# Patient Record
Sex: MALE | Race: WHITE | NOT HISPANIC OR LATINO | Employment: FULL TIME | URBAN - METROPOLITAN AREA
[De-identification: names, ages, dates, MRNs, and addresses within clinical notes are randomized per-mention and may not be internally consistent; named-entity substitution may affect disease eponyms.]

---

## 2017-02-07 ENCOUNTER — GENERIC CONVERSION - ENCOUNTER (OUTPATIENT)
Dept: OTHER | Facility: OTHER | Age: 52
End: 2017-02-07

## 2017-02-07 LAB
AMBIG ABBREV CMP14 DEFAULT (HISTORICAL): NORMAL
AMBIG ABBREV LP DEFAULT (HISTORICAL): NORMAL

## 2017-02-08 LAB
A/G RATIO (HISTORICAL): 1.8 (ref 1.1–2.5)
ALBUMIN SERPL BCP-MCNC: 4.6 G/DL (ref 3.5–5.5)
ALP SERPL-CCNC: 74 IU/L (ref 39–117)
ALT SERPL W P-5'-P-CCNC: 52 IU/L (ref 0–44)
AST SERPL W P-5'-P-CCNC: 29 IU/L (ref 0–40)
BILIRUB SERPL-MCNC: 0.4 MG/DL (ref 0–1.2)
BUN SERPL-MCNC: 17 MG/DL (ref 6–24)
BUN/CREA RATIO (HISTORICAL): 18 (ref 9–20)
CALCIUM SERPL-MCNC: 9.4 MG/DL (ref 8.7–10.2)
CHLORIDE SERPL-SCNC: 100 MMOL/L (ref 96–106)
CHOLEST SERPL-MCNC: 219 MG/DL (ref 100–199)
CO2 SERPL-SCNC: 23 MMOL/L (ref 18–29)
CREAT SERPL-MCNC: 0.93 MG/DL (ref 0.76–1.27)
DEPRECATED RDW RBC AUTO: 14.5 % (ref 12.3–15.4)
EGFR AFRICAN AMERICAN (HISTORICAL): 110 ML/MIN/1.73
EGFR-AMERICAN CALC (HISTORICAL): 95 ML/MIN/1.73
GLUCOSE SERPL-MCNC: 98 MG/DL (ref 65–99)
HCT VFR BLD AUTO: 47.9 % (ref 37.5–51)
HDLC SERPL-MCNC: 30 MG/DL
HGB BLD-MCNC: 16.9 G/DL (ref 12.6–17.7)
LDLC SERPL CALC-MCNC: ABNORMAL MG/DL (ref 0–99)
MCH RBC QN AUTO: 30.7 PG (ref 26.6–33)
MCHC RBC AUTO-ENTMCNC: 35.3 G/DL (ref 31.5–35.7)
MCV RBC AUTO: 87 FL (ref 79–97)
PLATELET # BLD AUTO: 266 X10E3/UL (ref 150–379)
POTASSIUM SERPL-SCNC: 4.6 MMOL/L (ref 3.5–5.2)
RBC (HISTORICAL): 5.5 X10E6/UL (ref 4.14–5.8)
SODIUM SERPL-SCNC: 141 MMOL/L (ref 134–144)
TOT. GLOBULIN, SERUM (HISTORICAL): 2.5 G/DL (ref 1.5–4.5)
TOTAL PROTEIN (HISTORICAL): 7.1 G/DL (ref 6–8.5)
TRIGL SERPL-MCNC: 412 MG/DL (ref 0–149)
TSH SERPL DL<=0.05 MIU/L-ACNC: 1.7 UIU/ML (ref 0.45–4.5)
VLDLC SERPL CALC-MCNC: ABNORMAL MG/DL (ref 5–40)
WBC # BLD AUTO: 6.6 X10E3/UL (ref 3.4–10.8)

## 2017-03-02 ENCOUNTER — ALLSCRIPTS OFFICE VISIT (OUTPATIENT)
Dept: OTHER | Facility: OTHER | Age: 52
End: 2017-03-02

## 2017-03-02 ENCOUNTER — GENERIC CONVERSION - ENCOUNTER (OUTPATIENT)
Dept: OTHER | Facility: OTHER | Age: 52
End: 2017-03-02

## 2017-04-28 ENCOUNTER — ALLSCRIPTS OFFICE VISIT (OUTPATIENT)
Dept: OTHER | Facility: OTHER | Age: 52
End: 2017-04-28

## 2018-01-09 NOTE — RESULT NOTES
Verified Results  (1) PSA (SCREEN) (Dx V76 44 Screen for Prostate Cancer) [id number] 11:20AM Lidya [place]     Test Name Result Flag Reference   Prostate Specific Ag, Serum 0 6 ng/mL  0 0-4 0   Roche ECLIA methodology  According to the American Urological Association, Serum PSA should  decrease and remain at undetectable levels after radical  prostatectomy  The AUA defines biochemical recurrence as an initial  PSA value 0 2 ng/mL or greater followed by a subsequent confirmatory  PSA value 0 2 ng/mL or greater  Values obtained with different assay methods or kits cannot be used  interchangeably  Results cannot be interpreted as absolute evidence  of the presence or absence of malignant disease  (1) COMPREHENSIVE METABOLIC PANEL [id number] 23:47OF [place] [place]   A courtesy copy of this report has been sent to  the patient       Test Name Result Flag Reference   Glucose, Serum 98 mg/dL  65-99   BUN 17 mg/dL  6-24   Creatinine, Serum 0 93 mg/dL  0 76-1 27   eGFR If NonAfricn Am 95 mL/min/1 73  >59   eGFR If Africn Am 110 mL/min/1 73  >59   BUN/Creatinine Ratio 18  9-20   Sodium, Serum 141 mmol/L  134-144   Potassium, Serum 4 6 mmol/L  3 5-5 2   Chloride, Serum 100 mmol/L     Carbon Dioxide, Total 23 mmol/L  18-29   Calcium, Serum 9 4 mg/dL  8 7-10 2   Protein, Total, Serum 7 1 g/dL  6 0-8 5   Albumin, Serum 4 6 g/dL  3 5-5 5   Globulin, Total 2 5 g/dL  1 5-4 5   A/G Ratio 1 8  1 1-2 5   Bilirubin, Total 0 4 mg/dL  0 0-1 2   Alkaline Phosphatase, S 74 IU/L     AST (SGOT) 29 IU/L  0-40   ALT (SGPT) 52 IU/L H 0-44     (1) CBC/ PLT (NO DIFF) [id number] 11:20AM Lidya [place]     Test Name Result Flag Reference   WBC 6 6 x10E3/uL  3 4-10 8   RBC 5 50 x10E6/uL  4 14-5 80   Hemoglobin 16 9 g/dL  12 6-17 7   Hematocrit 47 9 %  37 5-51 0   MCV 87 fL  79-97   MCH 30 7 pg  26 6-33 0   MCHC 35 3 g/dL  31 5-35 7   RDW 14 5 %  12 3-15 4   Platelets 399 Q07V0/VV  150-379     (Formerly [facility]) Lipid Panel [id number] 11:20AM Vijaya, Will [place]gal[place]     Test Name Result Flag Reference   Cholesterol, Total 219 mg/dL H 100-199   Triglycerides 412 mg/dL H 0-149   HDL Cholesterol 30 mg/dL L >39   VLDL Cholesterol Luis Comment mg/dL  5-40   The calculation for the VLDL cholesterol is not valid when  triglyceride level is >400 mg/dL  LDL Cholesterol Calc Comment mg/dL  0-99   Triglyceride result indicated is too high for an accurate LDL  cholesterol estimation  (1) TSH [id number] 11:20AM Dorthula [place]     Test Name Result Flag Reference   TSH 1 700 uIU/mL  0 450-4 500     [facility]) Eric Hernan CMP14 Default [id number] 11:20AM Dorthula [place]     Test Name Result Flag Reference   Eric Hernan CMP14 Default Comment     A hand-written panel/profile was received from your office  In  accordance with the [employer] Ambiguous Test Code Policy dated July 1242, we have completed your order by using the closest currently  or formerly recognized AMA panel  We have assigned Comprehensive  Metabolic Panel (14), Test Code #428185 to this request   If this  is not the testing you wished to receive on this specimen, please  contact the [facility] Inquiry/Technical Services Department  to clarify the test order  We appreciate your business  [facility]) Eric Hernan LP Default [id number] 11:20AM Dormarina Marian     Test Name Result Flag Reference   Ambig Abbrev LP Default Comment     A hand-written panel/profile was received from your office  In  accordance with the [employer] Ambiguous Test Code Policy dated July 3615, we have completed your order by using the closest currently  or formerly recognized AMA panel  We have assigned Lipid Panel,  Test Code #749681 to this request  If this is not the testing you  wished to receive on this specimen, please contact the [employer] Inquiry/Technical Services Department to clarify the test  order  We appreciate your business

## 2018-01-09 NOTE — RESULT NOTES
Verified Results  * XR HIPS BILATERAL WITH AP PELVIS [id number] 11:33AM Roque [place]     Test Name Result Flag Reference   XR HIPS BILATERAL WITH AP PELVIS (Report)     BILATERAL HIPS AND PELVIS     INDICATION: Bulge over posterior right hip, bilateral hip pain     COMPARISON: None     VIEWS: AP pelvis and coned down views of each hip; 5 images      FINDINGS:     No acute pelvic fracture or pathologic bone lesions  Visualized bony pelvis appears intact  LEFT HIP:   No significant degenerative changes  Bony alignment is maintained  Soft tissues are unremarkable  RIGHT HIP:   No significant degenerative changes  Bony alignment is maintained  Soft tissues are unremarkable  Incidental note of a small os acetabulum  Subcentimeter oval sclerotic lesion in the right femoral head compatible with a bone island  IMPRESSION:     Unremarkable hips and pelvis  Signed by:   Carol Barba MD   1/16/16       Discussion/Summary   Shilpa Chu let Zenaida Tovar know that XR was negative   F/u if sx  persist

## 2018-01-12 VITALS
BODY MASS INDEX: 28.17 KG/M2 | DIASTOLIC BLOOD PRESSURE: 80 MMHG | SYSTOLIC BLOOD PRESSURE: 130 MMHG | TEMPERATURE: 98.4 F | HEIGHT: 72 IN | WEIGHT: 208 LBS

## 2018-01-13 VITALS
TEMPERATURE: 98.2 F | DIASTOLIC BLOOD PRESSURE: 100 MMHG | SYSTOLIC BLOOD PRESSURE: 142 MMHG | BODY MASS INDEX: 27.22 KG/M2 | HEIGHT: 72 IN | WEIGHT: 201 LBS

## 2018-02-19 DIAGNOSIS — I10 BENIGN ESSENTIAL HYPERTENSION: Primary | ICD-10-CM

## 2018-02-19 RX ORDER — BISOPROLOL FUMARATE AND HYDROCHLOROTHIAZIDE 2.5; 6.25 MG/1; MG/1
TABLET ORAL
Qty: 30 TABLET | Refills: 5 | Status: SHIPPED | OUTPATIENT
Start: 2018-02-19 | End: 2018-08-07 | Stop reason: DRUGHIGH

## 2018-03-14 DIAGNOSIS — K21.00 ESOPHAGITIS, REFLUX: Primary | ICD-10-CM

## 2018-03-15 RX ORDER — PANTOPRAZOLE SODIUM 40 MG/1
TABLET, DELAYED RELEASE ORAL
Qty: 60 TABLET | Refills: 0 | Status: SHIPPED | OUTPATIENT
Start: 2018-03-15 | End: 2018-05-19 | Stop reason: SDUPTHER

## 2018-04-06 ENCOUNTER — OFFICE VISIT (OUTPATIENT)
Dept: FAMILY MEDICINE CLINIC | Facility: CLINIC | Age: 53
End: 2018-04-06
Payer: COMMERCIAL

## 2018-04-06 VITALS
WEIGHT: 211 LBS | SYSTOLIC BLOOD PRESSURE: 140 MMHG | TEMPERATURE: 97.2 F | DIASTOLIC BLOOD PRESSURE: 98 MMHG | BODY MASS INDEX: 29.02 KG/M2

## 2018-04-06 DIAGNOSIS — H69.81 ETD (EUSTACHIAN TUBE DYSFUNCTION), RIGHT: ICD-10-CM

## 2018-04-06 DIAGNOSIS — M54.2 CERVICALGIA: Primary | ICD-10-CM

## 2018-04-06 PROBLEM — H69.91 ETD (EUSTACHIAN TUBE DYSFUNCTION), RIGHT: Status: ACTIVE | Noted: 2018-04-06

## 2018-04-06 PROCEDURE — 99214 OFFICE O/P EST MOD 30 MIN: CPT | Performed by: FAMILY MEDICINE

## 2018-04-06 RX ORDER — CYCLOBENZAPRINE HCL 10 MG
10 TABLET ORAL 3 TIMES DAILY PRN
Qty: 30 TABLET | Refills: 0 | Status: SHIPPED | OUTPATIENT
Start: 2018-04-06 | End: 2018-08-09 | Stop reason: ALTCHOICE

## 2018-04-06 RX ORDER — ALPRAZOLAM 0.25 MG/1
1 TABLET ORAL
COMMUNITY
Start: 2011-11-03 | End: 2018-08-07 | Stop reason: SDUPTHER

## 2018-04-06 RX ORDER — FLUTICASONE PROPIONATE 50 MCG
2 SPRAY, SUSPENSION (ML) NASAL DAILY
Qty: 16 G | Refills: 5 | Status: SHIPPED | OUTPATIENT
Start: 2018-04-06 | End: 2018-08-09 | Stop reason: ALTCHOICE

## 2018-04-06 NOTE — PROGRESS NOTES
Assessment/Plan:  ETD (Eustachian tube dysfunction), right  Patient has some eustachian tube dysfunction symptoms as well as boggy nasal turbinates  He is going to use fluticasone nasal spray and called result in 2-3 weeks if not improved  Cervicalgia  Patient has some sternocleidomastoid tenderness which may be the etiology of his right-sided otalgia  We are going to give him some Flexeril to use at night  He is asked to call if his symptoms have not resolved in 7-10 days  I see no other evidence of cause for his otalgia  I feel no neck mass see no intraoral mass thyroid teeth tonsils tongue all appear normal presently  Diagnoses and all orders for this visit:    Cervicalgia  -     cyclobenzaprine (FLEXERIL) 10 mg tablet; Take 1 tablet (10 mg total) by mouth 3 (three) times a day as needed for muscle spasms    ETD (Eustachian tube dysfunction), right  -     fluticasone (FLONASE) 50 mcg/act nasal spray; 2 sprays into each nostril daily    Other orders  -     ALPRAZolam (XANAX) 0 25 mg tablet; Take 1 tablet by mouth          Subjective:   Chief Complaint   Patient presents with    Earache     right side xs three weeks     R otalgia that has been intermittent for 3 weeks  No drainage  No affect on hearing  Radiates to pre auricular region  No first am pain  No dental pain  Some ETD sx  Some sneezing  No nasal d/c and no PND  No AS c/o  Patient ID: Sancho Lopez is a 47 y o  male  HPI  The patient is a 60-year-old male who describes right-sided otalgia which has been intermittent for the past 3 weeks  He has had no effect on his hearing no drainage from the year  He does note that it radiates to the preauricular region  He has had no dental pain though he has had some eustachian tube dysfunction symptoms  He has also had some sneezing  He has had no nasal discharge and no postnasal drip  He has no symptoms on the left  He has had no tinnitus  He has no pain with chewing    He has no neck mass though he does have some left-sided neck pain at times  ry were reviewed and updated as appropriate: allergies, current medications, past medical history, past social history and problem list     Review of Systems   Constitution: Negative for fever  HENT: Positive for ear pain  Negative for ear discharge, hearing loss, odynophagia and sore throat  Respiratory: Negative for cough  Hematologic/Lymphatic: Negative for adenopathy  Musculoskeletal: Positive for neck pain  Neurological: Negative for dizziness and headaches  Objective:    Physical Exam   Constitutional: He is oriented to person, place, and time  He appears well-developed and well-nourished  HENT:   No findings in the oral cavity or dental issue  There is some boggy nasal turbinates and there are some minimal polypoid tissue on the left  Eyes: Pupils are equal, round, and reactive to light  Neck: No JVD present  No thyromegaly present  No thyroid mass or nodule   Cardiovascular: Normal rate, regular rhythm and normal heart sounds  Exam reveals no gallop  No murmur heard  Pulmonary/Chest: Effort normal and breath sounds normal    Musculoskeletal: He exhibits no edema  No TMJ tenderness  There is some tenderness of the right mastoid where it attached as to the sternocleidomastoid muscle  There is no mass in the area  Lymphadenopathy:     He has no cervical adenopathy  Neurological: He is alert and oriented to person, place, and time  Psychiatric: He has a normal mood and affect

## 2018-04-06 NOTE — ASSESSMENT & PLAN NOTE
Patient has some sternocleidomastoid tenderness which may be the etiology of his right-sided otalgia  We are going to give him some Flexeril to use at night  He is asked to call if his symptoms have not resolved in 7-10 days  I see no other evidence of cause for his otalgia  I feel no neck mass see no intraoral mass thyroid teeth tonsils tongue all appear normal presently

## 2018-04-06 NOTE — ASSESSMENT & PLAN NOTE
Patient has some eustachian tube dysfunction symptoms as well as boggy nasal turbinates  He is going to use fluticasone nasal spray and called result in 2-3 weeks if not improved

## 2018-05-19 DIAGNOSIS — K21.00 ESOPHAGITIS, REFLUX: ICD-10-CM

## 2018-05-21 RX ORDER — PANTOPRAZOLE SODIUM 40 MG/1
TABLET, DELAYED RELEASE ORAL
Qty: 60 TABLET | Refills: 0 | Status: SHIPPED | OUTPATIENT
Start: 2018-05-21 | End: 2018-07-04 | Stop reason: SDUPTHER

## 2018-06-07 DIAGNOSIS — L25.5 RHUS DERMATITIS: Primary | ICD-10-CM

## 2018-06-07 RX ORDER — METHYLPREDNISOLONE 4 MG/1
TABLET ORAL
Qty: 21 TABLET | Refills: 0 | Status: SHIPPED | OUTPATIENT
Start: 2018-06-07 | End: 2018-08-07 | Stop reason: ALTCHOICE

## 2018-07-04 DIAGNOSIS — K21.00 ESOPHAGITIS, REFLUX: ICD-10-CM

## 2018-07-05 RX ORDER — PANTOPRAZOLE SODIUM 40 MG/1
TABLET, DELAYED RELEASE ORAL
Qty: 60 TABLET | Refills: 0 | Status: SHIPPED | OUTPATIENT
Start: 2018-07-05 | End: 2018-10-09 | Stop reason: SDUPTHER

## 2018-08-07 ENCOUNTER — OFFICE VISIT (OUTPATIENT)
Dept: FAMILY MEDICINE CLINIC | Facility: CLINIC | Age: 53
End: 2018-08-07
Payer: COMMERCIAL

## 2018-08-07 VITALS
TEMPERATURE: 96.4 F | SYSTOLIC BLOOD PRESSURE: 130 MMHG | HEART RATE: 67 BPM | WEIGHT: 209 LBS | OXYGEN SATURATION: 95 % | BODY MASS INDEX: 28.74 KG/M2 | DIASTOLIC BLOOD PRESSURE: 84 MMHG

## 2018-08-07 DIAGNOSIS — I10 BENIGN ESSENTIAL HYPERTENSION: ICD-10-CM

## 2018-08-07 DIAGNOSIS — M79.673 PAIN OF FOOT, UNSPECIFIED LATERALITY: ICD-10-CM

## 2018-08-07 DIAGNOSIS — Z13.0 SCREENING FOR IRON DEFICIENCY ANEMIA: ICD-10-CM

## 2018-08-07 DIAGNOSIS — Z13.1 SCREENING FOR DIABETES MELLITUS: ICD-10-CM

## 2018-08-07 DIAGNOSIS — R22.1 SUBCUTANEOUS MASS OF NECK: ICD-10-CM

## 2018-08-07 DIAGNOSIS — E78.5 DYSLIPIDEMIA: Primary | ICD-10-CM

## 2018-08-07 DIAGNOSIS — F41.9 ANXIETY: ICD-10-CM

## 2018-08-07 PROCEDURE — 3075F SYST BP GE 130 - 139MM HG: CPT | Performed by: FAMILY MEDICINE

## 2018-08-07 PROCEDURE — 3079F DIAST BP 80-89 MM HG: CPT | Performed by: FAMILY MEDICINE

## 2018-08-07 PROCEDURE — 99214 OFFICE O/P EST MOD 30 MIN: CPT | Performed by: FAMILY MEDICINE

## 2018-08-07 RX ORDER — BISOPROLOL FUMARATE AND HYDROCHLOROTHIAZIDE 5; 6.25 MG/1; MG/1
1 TABLET ORAL DAILY
Qty: 90 TABLET | Refills: 1 | Status: SHIPPED | OUTPATIENT
Start: 2018-08-07 | End: 2019-03-14 | Stop reason: SDUPTHER

## 2018-08-07 RX ORDER — ALPRAZOLAM 0.25 MG/1
0.25 TABLET ORAL
Qty: 30 TABLET | Refills: 0 | Status: SHIPPED | OUTPATIENT
Start: 2018-08-07 | End: 2018-08-09 | Stop reason: ALTCHOICE

## 2018-08-07 NOTE — ASSESSMENT & PLAN NOTE
This may represent lipoma versus inclusion cyst   We are going to refer him to General surgery as there has been some tenderness associated with it  No constitutional symptoms

## 2018-08-07 NOTE — ASSESSMENT & PLAN NOTE
I do not believe there is a fracture though cannot rule out stress fracture metatarsal   We asked him to use a lace-up shoe with inserts  We also gave him some plantar fascia stretching exercises  If he sees no improvement in next 2-3 weeks we are going to potentially consider x-rays  He agrees

## 2018-08-07 NOTE — PROGRESS NOTES
Assessment/Plan:  Benign essential hypertension  We are going to increase his dose of Ziac to 5/6 25  He is asked to go for CBC CMP lipids  Subcutaneous mass of neck  This may represent lipoma versus inclusion cyst   We are going to refer him to General surgery as there has been some tenderness associated with it  No constitutional symptoms  Dyslipidemia  Lipid profile    Pain of foot  I do not believe there is a fracture though cannot rule out stress fracture metatarsal   We asked him to use a lace-up shoe with inserts  We also gave him some plantar fascia stretching exercises  If he sees no improvement in next 2-3 weeks we are going to potentially consider x-rays  He agrees  Diagnoses and all orders for this visit:    Dyslipidemia  -     Lipid panel; Future  -     Lipid panel    Subcutaneous mass of neck  -     Ambulatory referral to General Surgery; Future    Screening for iron deficiency anemia  -     CBC; Future  -     CBC    Screening for diabetes mellitus  -     Comprehensive metabolic panel; Future  -     Comprehensive metabolic panel    Anxiety  -     ALPRAZolam (XANAX) 0 25 mg tablet; Take 1 tablet (0 25 mg total) by mouth daily at bedtime as needed for anxiety    Benign essential hypertension  -     bisoprolol-hydrochlorothiazide (ZIAC) 5-6 25 MG per tablet; Take 1 tablet by mouth daily    Pain of foot, unspecified laterality          Subjective:   Chief Complaint   Patient presents with   [address]     pt here to discuss changing his bp meds to a higher dose  he is also c/o R foot pain when he goes to walk after sitting for a while  he is requesting a refill on her alprazolam which did renew  i will enter all his fasting labs for [facility]  last colonoscopy was in [place]  Neck mass 3 cm  , R foot pain after [place] [employer] frequently  2 months ago  I feel better when I take 1 1/2 pills  Patient ID: Shelly Banks is a 47 y o  male      HPI  Patient is a 61-year-old male here for follow-up of essential hypertension  He states that he is compliant with his Ziac 2 5 though he states that he takes 1/2 pills he typically feels better than if he takes only 1  He has had no chest pain shortness of breath fatigue lightheaded or dizziness X cetera  No GI or  complaints  He does complain of pain in his right foot  If he is at rest it does not bother him if he gets up and walks around he notes that he has discomfort  He has no nocturnal pain  He states that approximately 2 months ago he was jumping off and on his pontoon boat frequently while he was working on it he believes this may have been the issue  His symptoms are not claudicatory in nature  He also states that he has noted a mass at the base of his neck  It is not painful but it is tender if he manipulates it frequently  He has had no fevers chills weight loss anorexia night sweats or other constitutional symptoms  The following portions of the patient's history were reviewed and updated as appropriate: allergies, current medications, past family history, past medical history, past social history, past surgical history and problem list     Review of Systems   Constitution: Negative for chills, decreased appetite, fever, malaise/fatigue, night sweats and weight loss  HENT: Negative  Cardiovascular: Negative for chest pain, claudication, dyspnea on exertion, irregular heartbeat, leg swelling, orthopnea and paroxysmal nocturnal dyspnea  Respiratory: Negative for cough and shortness of breath  Endocrine: Negative  Hematologic/Lymphatic: Negative for adenopathy  Skin:        Subcutaneous mass of neck   Musculoskeletal: Positive for joint pain  Negative for muscle weakness and neck pain  Objective:    Physical Exam   Constitutional: He is oriented to person, place, and time  He appears well-developed and well-nourished  Eyes: Conjunctivae are normal  No scleral icterus  Neck: Neck supple  No JVD present   No thyromegaly present  Cardiovascular: Normal rate, regular rhythm and normal heart sounds  Exam reveals no gallop  No murmur heard  Pulmonary/Chest: Effort normal and breath sounds normal  No respiratory distress  He has no wheezes  He has no rales  Abdominal: Soft  Bowel sounds are normal  He exhibits no mass  Musculoskeletal: He exhibits tenderness  He exhibits no edema or deformity  He has some tenderness of the volar aspect of his foot in the arch region  Forefoot squeeze is negative  There is no deformity  There is no specific tenderness of metatarsals  Lymphadenopathy:     He has no cervical adenopathy  Neurological: He is alert and oriented to person, place, and time  Skin:   There is approximately a 3 cm subcutaneous mass at the mid to lower cervical midline region  It is firm/rubbery in feel and is not fluctuant  It is freely mobile  There is no erythema no drainage   Psychiatric: He has a normal mood and affect   Thought content normal

## 2018-08-09 ENCOUNTER — HOSPITAL ENCOUNTER (OUTPATIENT)
Dept: RADIOLOGY | Facility: HOSPITAL | Age: 53
Discharge: HOME | End: 2018-08-09
Attending: SURGERY
Payer: COMMERCIAL

## 2018-08-09 ENCOUNTER — LAB (OUTPATIENT)
Dept: LAB | Facility: HOSPITAL | Age: 53
End: 2018-08-09
Attending: SURGERY
Payer: COMMERCIAL

## 2018-08-09 ENCOUNTER — OFFICE VISIT (OUTPATIENT)
Dept: SURGERY | Facility: HOSPITAL | Age: 53
End: 2018-08-09
Attending: FAMILY MEDICINE
Payer: COMMERCIAL

## 2018-08-09 ENCOUNTER — HOSPITAL ENCOUNTER (OUTPATIENT)
Dept: NON INVASIVE DIAGNOSTICS | Facility: HOSPITAL | Age: 53
Discharge: HOME | End: 2018-08-09
Attending: SURGERY
Payer: COMMERCIAL

## 2018-08-09 VITALS
WEIGHT: 209.8 LBS | TEMPERATURE: 97.3 F | HEART RATE: 64 BPM | SYSTOLIC BLOOD PRESSURE: 120 MMHG | DIASTOLIC BLOOD PRESSURE: 72 MMHG | RESPIRATION RATE: 16 BRPM | BODY MASS INDEX: 28.42 KG/M2 | HEIGHT: 72 IN

## 2018-08-09 DIAGNOSIS — R22.1 SUBCUTANEOUS MASS OF NECK: ICD-10-CM

## 2018-08-09 DIAGNOSIS — K40.20 BILATERAL INGUINAL HERNIA WITHOUT OBSTRUCTION OR GANGRENE, RECURRENCE NOT SPECIFIED: ICD-10-CM

## 2018-08-09 DIAGNOSIS — D18.01 HEMANGIOMA OF SKIN: ICD-10-CM

## 2018-08-09 DIAGNOSIS — D22.9 MULTIPLE PIGMENTED NEVI: ICD-10-CM

## 2018-08-09 DIAGNOSIS — L21.9 SEBORRHEA: ICD-10-CM

## 2018-08-09 DIAGNOSIS — R22.1 SUBCUTANEOUS MASS OF NECK: Primary | ICD-10-CM

## 2018-08-09 LAB
ANION GAP SERPL CALCULATED.3IONS-SCNC: 7 MMOL/L (ref 4–13)
BASOPHILS # BLD AUTO: 0.06 THOUSANDS/ΜL (ref 0–0.1)
BASOPHILS NFR BLD AUTO: 1 % (ref 0–1)
BILIRUB UR QL STRIP: NEGATIVE
BUN SERPL-MCNC: 14 MG/DL (ref 5–25)
CALCIUM SERPL-MCNC: 9.3 MG/DL (ref 8.3–10.1)
CHLORIDE SERPL-SCNC: 99 MMOL/L (ref 100–108)
CLARITY UR: CLEAR
CO2 SERPL-SCNC: 31 MMOL/L (ref 21–32)
COLOR UR: YELLOW
CREAT SERPL-MCNC: 0.99 MG/DL (ref 0.6–1.3)
EOSINOPHIL # BLD AUTO: 0.13 THOUSAND/ΜL (ref 0–0.61)
EOSINOPHIL NFR BLD AUTO: 2 % (ref 0–6)
ERYTHROCYTE [DISTWIDTH] IN BLOOD BY AUTOMATED COUNT: 12.9 % (ref 11.6–15.1)
EST. AVERAGE GLUCOSE BLD GHB EST-MCNC: 111 MG/DL
GFR SERPL CREATININE-BSD FRML MDRD: 88 ML/MIN/1.73SQ M
GLUCOSE SERPL-MCNC: 107 MG/DL (ref 65–140)
GLUCOSE UR STRIP-MCNC: NEGATIVE MG/DL
HBA1C MFR BLD: 5.5 % (ref 4.2–6.3)
HCT VFR BLD AUTO: 49.6 % (ref 36.5–49.3)
HGB BLD-MCNC: 16.7 G/DL (ref 12–17)
HGB UR QL STRIP.AUTO: NEGATIVE
IMM GRANULOCYTES # BLD AUTO: 0.03 THOUSAND/UL (ref 0–0.2)
IMM GRANULOCYTES NFR BLD AUTO: 0 % (ref 0–2)
KETONES UR STRIP-MCNC: NEGATIVE MG/DL
LEUKOCYTE ESTERASE UR QL STRIP: NEGATIVE
LYMPHOCYTES # BLD AUTO: 2.16 THOUSANDS/ΜL (ref 0.6–4.47)
LYMPHOCYTES NFR BLD AUTO: 30 % (ref 14–44)
MCH RBC QN AUTO: 29.4 PG (ref 26.8–34.3)
MCHC RBC AUTO-ENTMCNC: 33.7 G/DL (ref 31.4–37.4)
MCV RBC AUTO: 87 FL (ref 82–98)
MONOCYTES # BLD AUTO: 0.75 THOUSAND/ΜL (ref 0.17–1.22)
MONOCYTES NFR BLD AUTO: 10 % (ref 4–12)
NEUTROPHILS # BLD AUTO: 4.07 THOUSANDS/ΜL (ref 1.85–7.62)
NEUTS SEG NFR BLD AUTO: 57 % (ref 43–75)
NITRITE UR QL STRIP: NEGATIVE
NRBC BLD AUTO-RTO: 0 /100 WBCS
PH UR STRIP.AUTO: 5.5 [PH] (ref 4.5–8)
PLATELET # BLD AUTO: 250 THOUSANDS/UL (ref 149–390)
PMV BLD AUTO: 10.3 FL (ref 8.9–12.7)
POTASSIUM SERPL-SCNC: 3.8 MMOL/L (ref 3.5–5.3)
PROT UR STRIP-MCNC: NEGATIVE MG/DL
RBC # BLD AUTO: 5.68 MILLION/UL (ref 3.88–5.62)
SODIUM SERPL-SCNC: 137 MMOL/L (ref 136–145)
SP GR UR STRIP.AUTO: >=1.03 (ref 1–1.03)
UROBILINOGEN UR QL STRIP.AUTO: 0.2 E.U./DL
WBC # BLD AUTO: 7.2 THOUSAND/UL (ref 4.31–10.16)

## 2018-08-09 PROCEDURE — 85025 COMPLETE CBC W/AUTO DIFF WBC: CPT

## 2018-08-09 PROCEDURE — 80048 BASIC METABOLIC PNL TOTAL CA: CPT

## 2018-08-09 PROCEDURE — 83036 HEMOGLOBIN GLYCOSYLATED A1C: CPT

## 2018-08-09 PROCEDURE — 93005 ELECTROCARDIOGRAM TRACING: CPT

## 2018-08-09 PROCEDURE — 36415 COLL VENOUS BLD VENIPUNCTURE: CPT

## 2018-08-09 PROCEDURE — 99204 OFFICE O/P NEW MOD 45 MIN: CPT | Performed by: SURGERY

## 2018-08-09 PROCEDURE — 71046 X-RAY EXAM CHEST 2 VIEWS: CPT

## 2018-08-09 PROCEDURE — 81003 URINALYSIS AUTO W/O SCOPE: CPT | Performed by: SURGERY

## 2018-08-09 NOTE — PROGRESS NOTES
Assessment/Plan: Gi Todd is 47year old male who presents today, per referral by Dr Omkar Del Castillo, for a lump of neck  Physical exam revealed a 3 x 3 x 2 5 cm mobile mass of the posterior neck, smooth and round and a skin lesion of lower back  Discussed risks, benefits, and alternatives to excision of mass and lesion in the OR  Explained the surgery, as well as pre- and post-procedural protocol and restrictions  Bilateral inguinal hernias, left irreducible- Discussed risks, benefits, and alternatives to laparoscopic bilateral inguinal hernia repair with mesh  Explained the surgery, as well as pre- and post-procedural protocol and restrictions  No heavy lifting greater than 15 pounds and no strenuous activity for the first two weeks  No heavy lifting greater than 25 pounds for weeks 3-4 with light cardiovascular activity permissible  Explained risks of strangulation and incarceration  He is adamant that he will not have mesh used  Explained risks of not using it  He understands the risks, but does not want to pursue hernia repair, despite recommendation that he have them repaired  Skin- Encouraged him to have his small pigmented nevi of neck and back and scattered hemangioma of back monitored by PCP or dermatologist         No problem-specific Assessment & Plan notes found for this encounter  Diagnoses and all orders for this visit:    Subcutaneous mass of neck  -     Ambulatory referral to General Surgery          Subjective:      Patient ID: Gi Todd is a 47 y o  male  Gi Todd is 47year old male who presents today, per referral by Dr Omkar Del Castillo, for a lump of neck  He has had it for two years  It is tender and painful  He has had a colonoscopy in the past but none recently            The following portions of the patient's history were reviewed and updated as appropriate: allergies, current medications, past family history, past medical history, past social history, past surgical history and problem list     Review of Systems   Constitutional: Negative  HENT: Negative  Eyes: Negative  Respiratory: Negative  Cardiovascular: Negative  Gastrointestinal: Positive for abdominal pain  Endocrine: Negative  Genitourinary: Negative  Musculoskeletal: Negative  Skin: Negative for color change, pallor, rash and wound  Mass of neck  Allergic/Immunologic: Negative  Neurological: Negative  Hematological: Negative  Psychiatric/Behavioral: Negative  All other systems reviewed and are negative  Objective:      /72   Pulse 64   Temp (!) 97 3 °F (36 3 °C) (Tympanic)   Resp 16   Ht 6' (1 829 m)   Wt 95 2 kg (209 lb 12 8 oz)   BMI 28 45 kg/m²          Physical Exam   Constitutional: He is oriented to person, place, and time  He appears well-developed and well-nourished  No distress  HENT:   Head: Normocephalic and atraumatic  Right Ear: External ear normal    Left Ear: External ear normal    Nose: Nose normal    Mouth/Throat: Oropharynx is clear and moist  No oropharyngeal exudate  Eyes: Conjunctivae and EOM are normal  Right eye exhibits no discharge  Left eye exhibits no discharge  No scleral icterus  Neck: Normal range of motion  Neck supple  No JVD present  No tracheal deviation present  No thyromegaly present  Cardiovascular: Normal rate, regular rhythm, normal heart sounds and intact distal pulses  Exam reveals no gallop and no friction rub  No murmur heard  Pulmonary/Chest: Effort normal and breath sounds normal  No stridor  No respiratory distress  He has no wheezes  He has no rales  He exhibits no tenderness  Abdominal: Soft  Bowel sounds are normal  He exhibits no distension and no mass  There is no tenderness  There is no rebound and no guarding  Bilateral inguinal hernias, left irreducible  Musculoskeletal: Normal range of motion  He exhibits no edema, tenderness or deformity     Lymphadenopathy:     He has no cervical adenopathy  Neurological: He is alert and oriented to person, place, and time  No cranial nerve deficit  Coordination normal    Skin: Skin is dry  No rash noted  He is not diaphoretic  No erythema  No pallor  3 x 3 x 2 5 cm mobile mass of the posterior neck, smooth and round  Small pigmented nevi of neck and back  Scattered hemangioma of back  Skin lesion of lower back  Psychiatric: He has a normal mood and affect  His behavior is normal  Thought content normal    Nursing note and vitals reviewed  By signing my name below, Yawpamela Pushpa, attest that this documentation has been prepared under the direction and in the presence of Reny Chapa MD  Electronically Signed: Sim Llamas  08/09/18     I, Reny Chapa, personally performed the services described in this documentation  All medical record entries made by the scribe were at my direction and in my presence  I have reviewed the chart and discharge instructions and agree that the record reflects my personal performance and is accurate and complete  Reny Chapa MD  08/09/18

## 2018-08-09 NOTE — LETTER
August 10, 2018     Antionette Finch MD  [address] [address]    Patient: Genesis Lane   YOB: 1965   Date of Visit: 8/9/2018       Dear Dr Coley Foot:    Thank you for referring Genesis Lane to me for evaluation  Below are my notes for this consultation  If you have questions, please do not hesitate to call me  I look forward to following your patient along with you           Sincerely,        Ny Ferreira MD        CC: No Recipients

## 2018-08-10 LAB
ATRIAL RATE: 57 BPM
P AXIS: 52 DEGREES
PR INTERVAL: 180 MS
QRS AXIS: -4 DEGREES
QRSD INTERVAL: 80 MS
QT INTERVAL: 416 MS
QTC INTERVAL: 404 MS
T WAVE AXIS: 8 DEGREES
VENTRICULAR RATE: 57 BPM

## 2018-08-10 PROCEDURE — 93010 ELECTROCARDIOGRAM REPORT: CPT | Performed by: INTERNAL MEDICINE

## 2018-08-10 RX ORDER — SODIUM CHLORIDE, SODIUM LACTATE, POTASSIUM CHLORIDE, CALCIUM CHLORIDE 600; 310; 30; 20 MG/100ML; MG/100ML; MG/100ML; MG/100ML
125 INJECTION, SOLUTION INTRAVENOUS CONTINUOUS
Status: CANCELLED | OUTPATIENT
Start: 2018-08-14

## 2018-08-10 NOTE — PRE-PROCEDURE INSTRUCTIONS
Pre-Surgery Instructions:   Medication Instructions    bisoprolol-hydrochlorothiazide (ZIAC) 5-6 25 MG per tablet Instructed patient per Anesthesia Guidelines   pantoprazole (PROTONIX) 40 mg tablet Instructed patient per Anesthesia Guidelines  Pre-procedure instructions given as a walk-in to PAT, no further questions or concerns at this time   Pt has CHG Wash and will review written instructions from Dr Eileen Bermudez prior to use

## 2018-08-14 ENCOUNTER — HOSPITAL ENCOUNTER (OUTPATIENT)
Facility: HOSPITAL | Age: 53
Setting detail: OUTPATIENT SURGERY
Discharge: HOME | End: 2018-08-14
Attending: SURGERY | Admitting: SURGERY
Payer: COMMERCIAL

## 2018-08-14 ENCOUNTER — ANESTHESIA EVENT (OUTPATIENT)
Dept: PERIOP | Facility: HOSPITAL | Age: 53
End: 2018-08-14
Payer: COMMERCIAL

## 2018-08-14 ENCOUNTER — ANESTHESIA (OUTPATIENT)
Dept: PERIOP | Facility: HOSPITAL | Age: 53
End: 2018-08-14
Payer: COMMERCIAL

## 2018-08-14 VITALS
OXYGEN SATURATION: 96 % | WEIGHT: 210 LBS | SYSTOLIC BLOOD PRESSURE: 147 MMHG | RESPIRATION RATE: 16 BRPM | DIASTOLIC BLOOD PRESSURE: 92 MMHG | TEMPERATURE: 97.4 F | HEIGHT: 72 IN | HEART RATE: 67 BPM | BODY MASS INDEX: 28.44 KG/M2

## 2018-08-14 DIAGNOSIS — R22.1 SUBCUTANEOUS MASS OF NECK: Primary | ICD-10-CM

## 2018-08-14 PROCEDURE — 21552 EXC NECK LES SC 3 CM/>: CPT | Performed by: SURGERY

## 2018-08-14 PROCEDURE — 88307 TISSUE EXAM BY PATHOLOGIST: CPT | Performed by: PATHOLOGY

## 2018-08-14 PROCEDURE — 21552 EXC NECK LES SC 3 CM/>: CPT | Performed by: PHYSICIAN ASSISTANT

## 2018-08-14 RX ORDER — HYDROCODONE BITARTRATE AND ACETAMINOPHEN 5; 325 MG/1; MG/1
1 TABLET ORAL EVERY 6 HOURS PRN
Status: DISCONTINUED | OUTPATIENT
Start: 2018-08-14 | End: 2018-08-14 | Stop reason: HOSPADM

## 2018-08-14 RX ORDER — MIDAZOLAM HYDROCHLORIDE 1 MG/ML
INJECTION INTRAMUSCULAR; INTRAVENOUS AS NEEDED
Status: DISCONTINUED | OUTPATIENT
Start: 2018-08-14 | End: 2018-08-14 | Stop reason: SURG

## 2018-08-14 RX ORDER — HYDRALAZINE HYDROCHLORIDE 20 MG/ML
5 INJECTION INTRAMUSCULAR; INTRAVENOUS AS NEEDED
Status: DISCONTINUED | OUTPATIENT
Start: 2018-08-14 | End: 2018-08-14 | Stop reason: HOSPADM

## 2018-08-14 RX ORDER — HYDROCODONE BITARTRATE AND ACETAMINOPHEN 5; 325 MG/1; MG/1
1 TABLET ORAL EVERY 6 HOURS PRN
Qty: 10 TABLET | Refills: 0 | Status: SHIPPED | OUTPATIENT
Start: 2018-08-14 | End: 2018-08-24

## 2018-08-14 RX ORDER — PROMETHAZINE HYDROCHLORIDE 25 MG/ML
6.25 INJECTION, SOLUTION INTRAMUSCULAR; INTRAVENOUS ONCE AS NEEDED
Status: DISCONTINUED | OUTPATIENT
Start: 2018-08-14 | End: 2018-08-14 | Stop reason: HOSPADM

## 2018-08-14 RX ORDER — FENTANYL CITRATE/PF 50 MCG/ML
25 SYRINGE (ML) INJECTION
Status: DISCONTINUED | OUTPATIENT
Start: 2018-08-14 | End: 2018-08-14 | Stop reason: HOSPADM

## 2018-08-14 RX ORDER — SODIUM CHLORIDE, SODIUM LACTATE, POTASSIUM CHLORIDE, CALCIUM CHLORIDE 600; 310; 30; 20 MG/100ML; MG/100ML; MG/100ML; MG/100ML
125 INJECTION, SOLUTION INTRAVENOUS CONTINUOUS
Status: DISCONTINUED | OUTPATIENT
Start: 2018-08-14 | End: 2018-08-14 | Stop reason: HOSPADM

## 2018-08-14 RX ORDER — ONDANSETRON 2 MG/ML
4 INJECTION INTRAMUSCULAR; INTRAVENOUS ONCE AS NEEDED
Status: DISCONTINUED | OUTPATIENT
Start: 2018-08-14 | End: 2018-08-14 | Stop reason: HOSPADM

## 2018-08-14 RX ORDER — FENTANYL CITRATE 50 UG/ML
INJECTION, SOLUTION INTRAMUSCULAR; INTRAVENOUS AS NEEDED
Status: DISCONTINUED | OUTPATIENT
Start: 2018-08-14 | End: 2018-08-14 | Stop reason: SURG

## 2018-08-14 RX ORDER — ACETAMINOPHEN 325 MG/1
650 TABLET ORAL EVERY 6 HOURS PRN
Status: DISCONTINUED | OUTPATIENT
Start: 2018-08-14 | End: 2018-08-14 | Stop reason: HOSPADM

## 2018-08-14 RX ORDER — METOCLOPRAMIDE HYDROCHLORIDE 5 MG/ML
10 INJECTION INTRAMUSCULAR; INTRAVENOUS ONCE AS NEEDED
Status: DISCONTINUED | OUTPATIENT
Start: 2018-08-14 | End: 2018-08-14 | Stop reason: HOSPADM

## 2018-08-14 RX ORDER — PROPOFOL 10 MG/ML
INJECTION, EMULSION INTRAVENOUS CONTINUOUS PRN
Status: DISCONTINUED | OUTPATIENT
Start: 2018-08-14 | End: 2018-08-14 | Stop reason: SURG

## 2018-08-14 RX ORDER — MEPERIDINE HYDROCHLORIDE 50 MG/ML
12.5 INJECTION INTRAMUSCULAR; INTRAVENOUS; SUBCUTANEOUS
Status: DISCONTINUED | OUTPATIENT
Start: 2018-08-14 | End: 2018-08-14 | Stop reason: HOSPADM

## 2018-08-14 RX ORDER — PROPOFOL 10 MG/ML
INJECTION, EMULSION INTRAVENOUS AS NEEDED
Status: DISCONTINUED | OUTPATIENT
Start: 2018-08-14 | End: 2018-08-14 | Stop reason: SURG

## 2018-08-14 RX ORDER — LABETALOL HYDROCHLORIDE 5 MG/ML
5 INJECTION, SOLUTION INTRAVENOUS AS NEEDED
Status: DISCONTINUED | OUTPATIENT
Start: 2018-08-14 | End: 2018-08-14 | Stop reason: HOSPADM

## 2018-08-14 RX ORDER — ONDANSETRON 2 MG/ML
4 INJECTION INTRAMUSCULAR; INTRAVENOUS EVERY 6 HOURS PRN
Status: DISCONTINUED | OUTPATIENT
Start: 2018-08-14 | End: 2018-08-14 | Stop reason: HOSPADM

## 2018-08-14 RX ADMIN — SODIUM CHLORIDE, SODIUM LACTATE, POTASSIUM CHLORIDE, AND CALCIUM CHLORIDE 125 ML/HR: .6; .31; .03; .02 INJECTION, SOLUTION INTRAVENOUS at 07:32

## 2018-08-14 RX ADMIN — FENTANYL CITRATE 100 MCG: 50 INJECTION, SOLUTION INTRAMUSCULAR; INTRAVENOUS at 08:25

## 2018-08-14 RX ADMIN — CEFAZOLIN SODIUM 2000 MG: 2 SOLUTION INTRAVENOUS at 08:28

## 2018-08-14 RX ADMIN — PROPOFOL 50 MG: 10 INJECTION, EMULSION INTRAVENOUS at 08:25

## 2018-08-14 RX ADMIN — MIDAZOLAM HYDROCHLORIDE 2 MG: 1 INJECTION, SOLUTION INTRAMUSCULAR; INTRAVENOUS at 08:25

## 2018-08-14 RX ADMIN — PROPOFOL 50 MCG/KG/MIN: 10 INJECTION, EMULSION INTRAVENOUS at 08:25

## 2018-08-14 NOTE — ANESTHESIA PREPROCEDURE EVALUATION
Review of Systems/Medical History  Patient summary reviewed  Chart reviewed  No history of anesthetic complications     Cardiovascular  EKG reviewed, Exercise tolerance (METS): >4,  Hypertension ,    Pulmonary  Not a smoker , Asthma , well controlled/ stable ,        GI/Hepatic    GERD well controlled,        Negative  ROS        Endo/Other  Negative endo/other ROS      GYN  Negative gynecology ROS          Hematology  Negative hematology ROS      Musculoskeletal  Negative musculoskeletal ROS        Neurology  Negative neurology ROS      Psychology   Anxiety, Depression ,              Physical Exam    Airway    Mallampati score: II  TM Distance: >3 FB  Neck ROM: full     Dental   No notable dental hx     Cardiovascular  Rhythm: regular, Rate: normal, Cardiovascular exam normal    Pulmonary  Pulmonary exam normal Breath sounds clear to auscultation,     Other Findings        Anesthesia Plan  ASA Score- 2     Anesthesia Type- IV sedation with anesthesia with ASA Monitors  Additional Monitors:   Airway Plan:         Plan Factors-    Induction-     Postoperative Plan- Plan for postoperative opioid use  Informed Consent- Anesthetic plan and risks discussed with patient  I personally reviewed this patient with the CRNA  Discussed and agreed on the Anesthesia Plan with the CRNA  Christina Campos

## 2018-08-14 NOTE — H&P (VIEW-ONLY)
Assessment/Plan: Michel Mitchell is 47year old male who presents today, per referral by Dr Pura Durbin, for a lump of neck  Physical exam revealed a 3 x 3 x 2 5 cm mobile mass of the posterior neck, smooth and round and a skin lesion of lower back  Discussed risks, benefits, and alternatives to excision of mass and lesion in the OR  Explained the surgery, as well as pre- and post-procedural protocol and restrictions  Bilateral inguinal hernias, left irreducible- Discussed risks, benefits, and alternatives to laparoscopic bilateral inguinal hernia repair with mesh  Explained the surgery, as well as pre- and post-procedural protocol and restrictions  No heavy lifting greater than 15 pounds and no strenuous activity for the first two weeks  No heavy lifting greater than 25 pounds for weeks 3-4 with light cardiovascular activity permissible  Explained risks of strangulation and incarceration  He is adamant that he will not have mesh used  Explained risks of not using it  He understands the risks, but does not want to pursue hernia repair, despite recommendation that he have them repaired  Skin- Encouraged him to have his small pigmented nevi of neck and back and scattered hemangioma of back monitored by PCP or dermatologist         No problem-specific Assessment & Plan notes found for this encounter  Diagnoses and all orders for this visit:    Subcutaneous mass of neck  -     Ambulatory referral to General Surgery          Subjective:      Patient ID: Michel Mitchell is a 47 y o  male  Michel Mitchell is 47year old male who presents today, per referral by Dr Pura Durbin, for a lump of neck  He has had it for two years  It is tender and painful  He has had a colonoscopy in the past but none recently            The following portions of the patient's history were reviewed and updated as appropriate: allergies, current medications, past family history, past medical history, past social history, past surgical history and problem list     Review of Systems   Constitutional: Negative  HENT: Negative  Eyes: Negative  Respiratory: Negative  Cardiovascular: Negative  Gastrointestinal: Positive for abdominal pain  Endocrine: Negative  Genitourinary: Negative  Musculoskeletal: Negative  Skin: Negative for color change, pallor, rash and wound  Mass of neck  Allergic/Immunologic: Negative  Neurological: Negative  Hematological: Negative  Psychiatric/Behavioral: Negative  All other systems reviewed and are negative  Objective:      /72   Pulse 64   Temp (!) 97 3 °F (36 3 °C) (Tympanic)   Resp 16   Ht 6' (1 829 m)   Wt 95 2 kg (209 lb 12 8 oz)   BMI 28 45 kg/m²          Physical Exam   Constitutional: He is oriented to person, place, and time  He appears well-developed and well-nourished  No distress  HENT:   Head: Normocephalic and atraumatic  Right Ear: External ear normal    Left Ear: External ear normal    Nose: Nose normal    Mouth/Throat: Oropharynx is clear and moist  No oropharyngeal exudate  Eyes: Conjunctivae and EOM are normal  Right eye exhibits no discharge  Left eye exhibits no discharge  No scleral icterus  Neck: Normal range of motion  Neck supple  No JVD present  No tracheal deviation present  No thyromegaly present  Cardiovascular: Normal rate, regular rhythm, normal heart sounds and intact distal pulses  Exam reveals no gallop and no friction rub  No murmur heard  Pulmonary/Chest: Effort normal and breath sounds normal  No stridor  No respiratory distress  He has no wheezes  He has no rales  He exhibits no tenderness  Abdominal: Soft  Bowel sounds are normal  He exhibits no distension and no mass  There is no tenderness  There is no rebound and no guarding  Bilateral inguinal hernias, left irreducible  Musculoskeletal: Normal range of motion  He exhibits no edema, tenderness or deformity     Lymphadenopathy:     He has no cervical adenopathy  Neurological: He is alert and oriented to person, place, and time  No cranial nerve deficit  Coordination normal    Skin: Skin is dry  No rash noted  He is not diaphoretic  No erythema  No pallor  3 x 3 x 2 5 cm mobile mass of the posterior neck, smooth and round  Small pigmented nevi of neck and back  Scattered hemangioma of back  Skin lesion of lower back  Psychiatric: He has a normal mood and affect  His behavior is normal  Thought content normal    Nursing note and vitals reviewed  By signing my name below, Krystin Guzmán, attest that this documentation has been prepared under the direction and in the presence of Juan Francisco Camacho MD  Electronically Signed: Sim Mendoza  08/09/18     I, Juan Francisco Camacho, personally performed the services described in this documentation  All medical record entries made by the valeryibnya were at my direction and in my presence  I have reviewed the chart and discharge instructions and agree that the record reflects my personal performance and is accurate and complete  Juan Francisco Camacho MD  08/09/18

## 2018-08-14 NOTE — DISCHARGE INSTRUCTIONS
Tessie [facility] Instructions      Dr Lory CINTRON     1  General: Roxana Call will feel pulling sensations around the wound or funny aches and pains around the incisions  This is normal  Even minor surgery is a change in your body and this is your bodys way of reaction to it  If you have had abdominal surgery, it may help to support the incision with a small pillow or blanket for comfort when moving or coughing  2  Wound care:    Bandage/Dressing - Make sure to remove the bandage in about 24 hours, unless instructed otherwise  You usually don't have to redress the wound after 24-48 hours, unless for comfort  Keep the incision clean and dry  Let air get to it  If the Steri-Strips fall off, just keep the wound clean  Glue - Leave glue alone, it will fall off on its own, no need for an additional dressings    3  Water: You may shower over the wound, unless there are drain tubes left in place  Do not bathe or use a pool or hot tub until cleared by the physician  You may shower right over the staples, glue or Steri-Strips and rinse wound with soapy water but do not scrub incision pat dry when you are done  4  Activity: You may go up and down stairs, walk as much as you are comfortable, but walk at least 3 times each day  If you have had abdominal surgery, do not lift anything heavier than 15 pounds for at least 2 weeks, unless cleared by the doctor  5  Diet: You may resume a regular diet  If you had a same-day surgery or overnight stay surgery, you may wish to eat lightly for a few days: soups, crackers, and sandwiches  You may resume a regular diet when ready  6  Medications: Resume all of your previous medications, unless told otherwise by the doctor  Avoid aspirin or ibuprofen (Advil, Motrin, etc ) products for 2-3 days after the date of surgery  You may, at that time, began to take them again   Tylenol is always fine, unless you are taking any narcotic pain medication containing Tylenol (such as Percocet, Darvocet, Vicodin, or anything containing acetaminophen)  Do not take Tylenol if you're taking these medications  You do not need to take the narcotic pain medications unless you are having significant pain and discomfort  7  Driving: He will need someone to drive you home on the day of surgery  Do not drive or make any important decisions while on narcotic pain medication or 24 hours and after anesthesia or sedation for surgery  Generally, you may drive when your off all narcotic pain medications  8  Upset Stomach: You may take Maalox, Tums, or similar items for an upset stomach  If your narcotic pain medication causes an upset stomach, do not take it on an empty stomach  Try taking it with at least some crackers or toast      9  Constipation: Patients often experienced constipation after surgery  You may take over-the-counter medication for this, such as Metamucil, Senokot, Dulcolax, milk of magnesia, etc  You may take a suppository unless you have had anorectal surgery such as a procedure on your hemorrhoids  If you experience significant nausea or vomiting after abdominal surgery, call the office before trying any of these medications  10  Call the office: If you are experiencing any of the following, fevers above 101 5°, significant nausea or vomiting, if the wound develops drainage and/or is excessive redness around the wound, or if you have significant diarrhea or other worsening symptoms  11  Pain: You may be given a prescription for pain  This will be given to the hospital, the day of surgery  12  Sexual Activity: You may resume sexual activity when you feel ready and comfortable and your incision is sealed and healed without apparent infection risk  13  Urination: If you haven't urinated in 6 hours, go directly to the ER for evaluation for urinary retention  14  Follow-up in 2 weeks          [facility]  Phone: [phone]

## 2018-08-14 NOTE — OP NOTE
Excision soft tissue /skin lesion Procedure Note    Name: Hawa Marcos   : 1965  MRN: [id number]  Date: 2018    Indications: The patient has a soft tissue or skin lesion requiring excision    Pre-operative Diagnosis: Posterior neck mass  Post-operative Diagnosis: Posterior neck mass  Procedure: Resection of Posterior neck mass  Surgeon: Julius Turner MD  Assistants: Jad Vang PA-C    Procedure Details   The patient was seen in the Holding Room  The risks, benefits, complications, treatment options, and expected outcomes were discussed with the patient  The possibilities of reaction to medication, bleeding, infection, the need for additional procedures, failure to diagnose a condition, and creating a complication operation were discussed with the patient  The patient concurred with the proposed plan, giving informed consent  The site of surgery properly noted/marked  The patient was taken to Operating Room, identified as Hawa Marcos and staff verified the patient name, , site, and laterality, if applicable  A Time Out was held and the above information confirmed  The patient was placed lying supine  The neck was prepped and draped in standard fashion  Local anesthesia was used to anesthetize the skin surrounding a 4 cm lesion  An incision was made over the lesion  Sharp and blunt dissection were used to mobilize the mass which was in a subcutaneous location  Hemostasis was achieved with cautery  Scissors, knife, and cautery were used in the excision  5mm  margins were taken around the lesion  Closure was achieved a with layered closure utilizing a 3-0 Vicryl subcutaneous layer and a  4-0 Monocryl subcuticular stitch  Histacryl was applied  At the end of the operation, all sponge, instrument, and needle counts were correct  This text is generated with voice recognition software  There may be translation, syntax,  or grammatical errors   If you have any questions, please contact the dictating provider  Findings:  Size: 4x4x3 cm  Margins:5 mm    Estimated Blood Loss:  Minimal                      Specimens: All specimens sent to pathology  Order Name Source Comment Collection Info Order Time   TISSUE EXAM Neck  Collected By: Julius Turner MD 8/14/2018  8:45 AM                      Complications:  None; patient tolerated the procedure well             Disposition: PACU     Condition: stable    Attending Attestation: I was present for the entire procedure    Signature:   Julius Turner MD  Date: 8/14/2018 Time: 8:52 AM

## 2018-08-27 ENCOUNTER — OFFICE VISIT (OUTPATIENT)
Dept: SURGERY | Facility: HOSPITAL | Age: 53
End: 2018-08-27

## 2018-08-27 VITALS
TEMPERATURE: 97.5 F | BODY MASS INDEX: 29.53 KG/M2 | SYSTOLIC BLOOD PRESSURE: 142 MMHG | DIASTOLIC BLOOD PRESSURE: 96 MMHG | WEIGHT: 218 LBS | HEIGHT: 72 IN

## 2018-08-27 DIAGNOSIS — Z09 POSTOP CHECK: Primary | ICD-10-CM

## 2018-08-27 PROCEDURE — 99024 POSTOP FOLLOW-UP VISIT: CPT | Performed by: SURGERY

## 2018-08-27 NOTE — LETTER
August 28, 2018     Misty Briones MD  [address] [place] [address]    Patient: Becki Krueger   YOB: 1965   Date of Visit: 8/27/2018       Dear Dr Bekah Bah:    Thank you for referring Becki Krueger to me for evaluation  Below are my notes for this consultation  If you have questions, please do not hesitate to call me  I look forward to following your patient along with you           Sincerely,        Renita Mckenna MD        CC: No Recipients

## 2018-08-27 NOTE — PROGRESS NOTES
Assessment/Plan: Lance Atkinson is a 47year old male who presents today status post excision of a subcutaneous mass of posterior neck performed in OR on 8/14/18  Physical exam revealed slight swelling of the wound, possibly a seroma  Drained seroma from the wound to assist with healing  Discussed pathology results  He should follow up in a week  He knows to call the office if any questions or concerns arise  No problem-specific Assessment & Plan notes found for this encounter  There are no diagnoses linked to this encounter  Subjective:      Patient ID: Lance Atkinson is a 47 y o  male  Lance Atkinson is a 47year old male who presents today status post excision of subcutaneous mass of posterior neck performed in OR on 8/14/18  He reports it is still a little tender and he is taking ibuprofen for it  The following portions of the patient's history were reviewed and updated as appropriate: allergies, current medications, past family history, past medical history, past social history, past surgical history and problem list     Review of Systems   Constitutional: Negative  HENT: Negative  Eyes: Negative  Respiratory: Negative  Cardiovascular: Negative  Gastrointestinal: Negative  Endocrine: Negative  Genitourinary: Negative  Musculoskeletal: Negative  Skin: Negative  Allergic/Immunologic: Negative  Neurological: Negative  Hematological: Negative  Psychiatric/Behavioral: Negative  All other systems reviewed and are negative  Objective: There were no vitals taken for this visit  Physical Exam   Constitutional: He is oriented to person, place, and time  He appears well-developed and well-nourished  No distress  HENT:   Head: Normocephalic and atraumatic  Right Ear: External ear normal    Left Ear: External ear normal    Nose: Nose normal    Mouth/Throat: Oropharynx is clear and moist  No oropharyngeal exudate     Eyes: Conjunctivae and EOM are normal  Right eye exhibits no discharge  Left eye exhibits no discharge  No scleral icterus  Neck: Normal range of motion  Neck supple  No JVD present  No tracheal deviation present  No thyromegaly present  Cardiovascular: Normal rate, regular rhythm, normal heart sounds and intact distal pulses  Exam reveals no gallop and no friction rub  No murmur heard  Pulmonary/Chest: Effort normal and breath sounds normal  No stridor  No respiratory distress  He has no wheezes  He has no rales  He exhibits no tenderness  Abdominal: Soft  Bowel sounds are normal  He exhibits no distension and no mass  There is no tenderness  There is no rebound and no guarding  Musculoskeletal: Normal range of motion  He exhibits no edema, tenderness or deformity  Lymphadenopathy:     He has no cervical adenopathy  Neurological: He is alert and oriented to person, place, and time  No cranial nerve deficit  Coordination normal    Skin: Skin is dry  No rash noted  He is not diaphoretic  No erythema  No pallor  Slight swelling of wound  Psychiatric: He has a normal mood and affect  His behavior is normal  Thought content normal    Nursing note and vitals reviewed  By signing my name below, IDevorah, attest that this documentation has been prepared under the direction and in the presence of Levi Luna MD  Electronically Signed: Sim Tovar  08/27/18  ILevi, personally performed the services described in this documentation  All medical record entries made by the scribe were at my direction and in my presence  I have reviewed the chart and discharge instructions and agree that the record reflects my personal performance and is accurate and complete  Levi Luna MD  08/27/18

## 2018-09-06 ENCOUNTER — OFFICE VISIT (OUTPATIENT)
Dept: SURGERY | Facility: HOSPITAL | Age: 53
End: 2018-09-06

## 2018-09-06 VITALS — TEMPERATURE: 98.1 F | BODY MASS INDEX: 29.01 KG/M2 | WEIGHT: 214.2 LBS | HEIGHT: 72 IN

## 2018-09-06 DIAGNOSIS — Z09 POSTOP CHECK: Primary | ICD-10-CM

## 2018-09-06 PROCEDURE — 99024 POSTOP FOLLOW-UP VISIT: CPT | Performed by: SURGERY

## 2018-09-06 NOTE — PROGRESS NOTES
Assessment/Plan: Aman Lyn is a 47year old male who presents today in post-operative state status post biopsy excision of lesion of posterior neck performed on 8/14/18  Physical exam revealed serous fluid and a slight seroma, but no infection  He should pack the wound to help release excess fluid  He should only use a small amount of packing until it is too shallow to pack  He should follow up in a week  He knows to call the office if any questions or concerns arise  No problem-specific Assessment & Plan notes found for this encounter  There are no diagnoses linked to this encounter  Subjective:      Patient ID: Aman Lyn is a 47 y o  male  Aman Lyn is a 47year old male who presents today in post-operative state status post biopsy excision of lesion of posterior neck performed on 8/14/18  He reports the lump got bigger and harder, then popped and drained a few days ago  Noticed his lymph nodes were larger when the lump got larger  The following portions of the patient's history were reviewed and updated as appropriate: allergies, current medications, past family history, past medical history, past social history, past surgical history and problem list     Review of Systems   Constitutional: Negative  HENT: Negative  Eyes: Negative  Respiratory: Negative  Cardiovascular: Negative  Gastrointestinal: Negative  Endocrine: Negative  Genitourinary: Negative  Musculoskeletal: Negative  Skin: Negative  Lump of posterior neck  Allergic/Immunologic: Negative  Neurological: Negative  Hematological: Negative  Psychiatric/Behavioral: Negative  All other systems reviewed and are negative  Objective: There were no vitals taken for this visit  Physical Exam   Constitutional: He is oriented to person, place, and time  He appears well-developed and well-nourished  No distress  HENT:   Head: Normocephalic and atraumatic     Right Ear: External ear normal    Left Ear: External ear normal    Nose: Nose normal    Mouth/Throat: Oropharynx is clear and moist  No oropharyngeal exudate  Eyes: Conjunctivae and EOM are normal  Right eye exhibits no discharge  Left eye exhibits no discharge  No scleral icterus  Neck: Normal range of motion  Neck supple  No JVD present  No tracheal deviation present  No thyromegaly present  Cardiovascular: Normal rate, regular rhythm, normal heart sounds and intact distal pulses  Exam reveals no gallop and no friction rub  No murmur heard  Pulmonary/Chest: Effort normal and breath sounds normal  No stridor  No respiratory distress  He has no wheezes  He has no rales  He exhibits no tenderness  Abdominal: Soft  Bowel sounds are normal  He exhibits no distension and no mass  There is no tenderness  There is no rebound and no guarding  Musculoskeletal: Normal range of motion  He exhibits no edema, tenderness or deformity  Lymphadenopathy:     He has no cervical adenopathy  Neurological: He is alert and oriented to person, place, and time  No cranial nerve deficit  Coordination normal    Skin: Skin is dry  No rash noted  He is not diaphoretic  No erythema  No pallor  Serous fluid and slight seroma, but no infection  Psychiatric: He has a normal mood and affect  His behavior is normal  Thought content normal    Nursing note and vitals reviewed  By signing my name below, I, Holland Leyva, attest that this documentation has been prepared under the direction and in the presence of Kaity Olivera MD  Electronically Signed: Sim Garcia  9/6/18  Alma Mota personally performed the services described in this documentation  All medical record entries made by the valeryibnya were at my direction and in my presence  I have reviewed the chart and discharge instructions and agree that the record reflects my personal performance and is accurate and complete    Kaity Olivera MD  9/6/18

## 2018-09-06 NOTE — LETTER
September 6, 2018     Joelle Espinoza MD  [address]    Patient: Rambo Muse   YOB: 1965   Date of Visit: 9/6/2018       Dear Dr Kiya Quan:    Thank you for referring Rambo Muse to me for evaluation  Below are my notes for this consultation  If you have questions, please do not hesitate to call me  I look forward to following your patient along with you           Sincerely,        Nereida Bartholomew MD        CC: No Recipients

## 2018-09-13 ENCOUNTER — OFFICE VISIT (OUTPATIENT)
Dept: SURGERY | Facility: HOSPITAL | Age: 53
End: 2018-09-13

## 2018-09-13 VITALS — WEIGHT: 214.4 LBS | BODY MASS INDEX: 29.04 KG/M2 | TEMPERATURE: 96.9 F | HEIGHT: 72 IN

## 2018-09-13 DIAGNOSIS — Z09 POSTOP CHECK: Primary | ICD-10-CM

## 2018-09-13 PROCEDURE — 99024 POSTOP FOLLOW-UP VISIT: CPT | Performed by: SURGERY

## 2018-09-13 NOTE — PROGRESS NOTES
Assessment/Plan: Becki Krueger is a 47year old male who presents today for re-evaluation of wound of posterior neck  Physical exam revealed wound is healing well with some erythema around it  Explained erythema may be a small reaction to the suture  If a suture appears in the wound he may remove it himself  He may follow up as needed  He knows to call the office if any questions or concerns arise  No problem-specific Assessment & Plan notes found for this encounter  There are no diagnoses linked to this encounter  Subjective:      Patient ID: Becki Krueger is a 47 y o  male  Becki Krueger is a 47year old male who presents today for re-evaluation of wound of posterior neck  He reports he is doing well  Wound Check         The following portions of the patient's history were reviewed and updated as appropriate: allergies, current medications, past family history, past medical history, past social history, past surgical history and problem list     Review of Systems   Constitutional: Negative  HENT: Negative  Eyes: Negative  Respiratory: Negative  Cardiovascular: Negative  Gastrointestinal: Negative  Endocrine: Negative  Genitourinary: Negative  Musculoskeletal: Negative  Skin: Positive for wound (Posterior neck  )  Allergic/Immunologic: Negative  Neurological: Negative  Hematological: Negative  Psychiatric/Behavioral: Negative  All other systems reviewed and are negative  Objective:      Temp (!) 96 9 °F (36 1 °C) (Tympanic)   Ht 6' (1 829 m)   Wt 97 3 kg (214 lb 6 4 oz)   BMI 29 08 kg/m²          Physical Exam   Constitutional: He is oriented to person, place, and time  He appears well-developed and well-nourished  No distress  HENT:   Head: Normocephalic and atraumatic  Right Ear: External ear normal    Left Ear: External ear normal    Nose: Nose normal    Mouth/Throat: Oropharynx is clear and moist  No oropharyngeal exudate     Eyes: Conjunctivae and EOM are normal  Right eye exhibits no discharge  Left eye exhibits no discharge  No scleral icterus  Neck: Normal range of motion  Neck supple  No JVD present  No tracheal deviation present  No thyromegaly present  Cardiovascular: Normal rate, regular rhythm, normal heart sounds and intact distal pulses  Exam reveals no gallop and no friction rub  No murmur heard  Pulmonary/Chest: Effort normal and breath sounds normal  No stridor  No respiratory distress  He has no wheezes  He has no rales  He exhibits no tenderness  Abdominal: Soft  Bowel sounds are normal  He exhibits no distension and no mass  There is no tenderness  There is no rebound and no guarding  Musculoskeletal: Normal range of motion  He exhibits no edema, tenderness or deformity  Lymphadenopathy:     He has no cervical adenopathy  Neurological: He is alert and oriented to person, place, and time  No cranial nerve deficit  Coordination normal    Skin: Skin is dry  No rash noted  He is not diaphoretic  No erythema  No pallor  Wound is healing well with some erythema around it  Psychiatric: He has a normal mood and affect  His behavior is normal  Thought content normal    Nursing note and vitals reviewed  By signing my name below, I, Elizabeth Granados, attest that this documentation has been prepared under the direction and in the presence of Yen Yost MD  Electronically Signed: Sim Ravi  9/13/18  Sidney Nichols, personally performed the services described in this documentation  All medical record entries made by the valeryibnya were at my direction and in my presence  I have reviewed the chart and discharge instructions and agree that the record reflects my personal performance and is accurate and complete  Yen Yost MD  9/13/18

## 2018-09-13 NOTE — LETTER
September 14, 2018     Misty Briones MD  [address] [address]    Patient: Becki Krueger   YOB: 1965   Date of Visit: 9/13/2018       Dear Dr Bekah Bah:    Thank you for referring Becki Krueger to me for evaluation  Below are my notes for this consultation  If you have questions, please do not hesitate to call me  I look forward to following your patient along with you           Sincerely,        Renita Mckenna MD        CC: No Recipients

## 2018-09-28 DIAGNOSIS — L25.5 RHUS DERMATITIS: ICD-10-CM

## 2018-10-01 DIAGNOSIS — L25.5 RHUS DERMATITIS: Primary | ICD-10-CM

## 2018-10-01 RX ORDER — METHYLPREDNISOLONE 4 MG/1
TABLET ORAL
Qty: 21 TABLET | Refills: 0 | OUTPATIENT
Start: 2018-10-01

## 2018-10-01 RX ORDER — METHYLPREDNISOLONE 4 MG/1
TABLET ORAL
Qty: 21 TABLET | Refills: 0 | Status: SHIPPED | OUTPATIENT
Start: 2018-10-01 | End: 2019-04-04 | Stop reason: ALTCHOICE

## 2018-10-09 DIAGNOSIS — K21.00 ESOPHAGITIS, REFLUX: ICD-10-CM

## 2018-10-09 RX ORDER — PANTOPRAZOLE SODIUM 40 MG/1
TABLET, DELAYED RELEASE ORAL
Qty: 60 TABLET | Refills: 0 | Status: SHIPPED | OUTPATIENT
Start: 2018-10-09 | End: 2018-12-28 | Stop reason: SDUPTHER

## 2018-12-26 DIAGNOSIS — K21.00 ESOPHAGITIS, REFLUX: ICD-10-CM

## 2018-12-27 RX ORDER — PANTOPRAZOLE SODIUM 40 MG/1
TABLET, DELAYED RELEASE ORAL
Qty: 60 TABLET | Refills: 0 | OUTPATIENT
Start: 2018-12-27

## 2018-12-28 DIAGNOSIS — K21.00 ESOPHAGITIS, REFLUX: ICD-10-CM

## 2018-12-28 RX ORDER — PANTOPRAZOLE SODIUM 40 MG/1
TABLET, DELAYED RELEASE ORAL
Qty: 60 TABLET | Refills: 0 | Status: SHIPPED | OUTPATIENT
Start: 2018-12-28 | End: 2019-03-14 | Stop reason: SDUPTHER

## 2019-03-10 DIAGNOSIS — I10 BENIGN ESSENTIAL HYPERTENSION: ICD-10-CM

## 2019-03-10 DIAGNOSIS — K21.00 ESOPHAGITIS, REFLUX: ICD-10-CM

## 2019-03-10 RX ORDER — PANTOPRAZOLE SODIUM 40 MG/1
TABLET, DELAYED RELEASE ORAL
Qty: 60 TABLET | Refills: 0 | OUTPATIENT
Start: 2019-03-10

## 2019-03-10 RX ORDER — BISOPROLOL FUMARATE AND HYDROCHLOROTHIAZIDE 5; 6.25 MG/1; MG/1
TABLET ORAL
Qty: 90 TABLET | Refills: 1 | OUTPATIENT
Start: 2019-03-10

## 2019-03-14 DIAGNOSIS — K21.00 ESOPHAGITIS, REFLUX: ICD-10-CM

## 2019-03-14 DIAGNOSIS — I10 BENIGN ESSENTIAL HYPERTENSION: ICD-10-CM

## 2019-03-14 RX ORDER — PANTOPRAZOLE SODIUM 40 MG/1
40 TABLET, DELAYED RELEASE ORAL 2 TIMES DAILY
Qty: 60 TABLET | Refills: 0 | Status: SHIPPED | OUTPATIENT
Start: 2019-03-14 | End: 2019-04-04 | Stop reason: ALTCHOICE

## 2019-03-14 RX ORDER — BISOPROLOL FUMARATE AND HYDROCHLOROTHIAZIDE 5; 6.25 MG/1; MG/1
1 TABLET ORAL DAILY
Qty: 30 TABLET | Refills: 0 | Status: SHIPPED | OUTPATIENT
Start: 2019-03-14 | End: 2019-04-04 | Stop reason: SDUPTHER

## 2019-04-04 ENCOUNTER — OFFICE VISIT (OUTPATIENT)
Dept: FAMILY MEDICINE CLINIC | Facility: CLINIC | Age: 54
End: 2019-04-04
Payer: COMMERCIAL

## 2019-04-04 VITALS
HEART RATE: 65 BPM | TEMPERATURE: 96.9 F | DIASTOLIC BLOOD PRESSURE: 88 MMHG | BODY MASS INDEX: 29.53 KG/M2 | SYSTOLIC BLOOD PRESSURE: 138 MMHG | WEIGHT: 218 LBS | HEIGHT: 72 IN

## 2019-04-04 DIAGNOSIS — I10 BENIGN ESSENTIAL HYPERTENSION: Primary | ICD-10-CM

## 2019-04-04 DIAGNOSIS — F32.A DEPRESSION, UNSPECIFIED DEPRESSION TYPE: ICD-10-CM

## 2019-04-04 DIAGNOSIS — K21.00 ESOPHAGITIS, REFLUX: ICD-10-CM

## 2019-04-04 DIAGNOSIS — F41.9 ANXIETY: ICD-10-CM

## 2019-04-04 PROBLEM — L25.5 RHUS DERMATITIS: Status: RESOLVED | Noted: 2018-10-01 | Resolved: 2019-04-04

## 2019-04-04 PROBLEM — M79.673 PAIN OF FOOT: Status: RESOLVED | Noted: 2018-08-07 | Resolved: 2019-04-04

## 2019-04-04 PROBLEM — M54.2 CERVICALGIA: Status: RESOLVED | Noted: 2018-04-06 | Resolved: 2019-04-04

## 2019-04-04 PROCEDURE — 99214 OFFICE O/P EST MOD 30 MIN: CPT | Performed by: FAMILY MEDICINE

## 2019-04-04 PROCEDURE — 1036F TOBACCO NON-USER: CPT | Performed by: FAMILY MEDICINE

## 2019-04-04 PROCEDURE — 3075F SYST BP GE 130 - 139MM HG: CPT | Performed by: FAMILY MEDICINE

## 2019-04-04 PROCEDURE — 3079F DIAST BP 80-89 MM HG: CPT | Performed by: FAMILY MEDICINE

## 2019-04-04 PROCEDURE — 3008F BODY MASS INDEX DOCD: CPT | Performed by: FAMILY MEDICINE

## 2019-04-04 RX ORDER — BISOPROLOL FUMARATE AND HYDROCHLOROTHIAZIDE 5; 6.25 MG/1; MG/1
1 TABLET ORAL DAILY
Qty: 90 TABLET | Refills: 1 | Status: SHIPPED | OUTPATIENT
Start: 2019-04-04 | End: 2020-02-07 | Stop reason: ALTCHOICE

## 2019-04-04 RX ORDER — PANTOPRAZOLE SODIUM 40 MG/1
40 TABLET, DELAYED RELEASE ORAL 2 TIMES DAILY
Qty: 60 TABLET | Refills: 0 | Status: CANCELLED | OUTPATIENT
Start: 2019-04-04

## 2019-04-04 RX ORDER — RANITIDINE 300 MG/1
300 CAPSULE ORAL EVERY EVENING
Qty: 90 CAPSULE | Refills: 1 | Status: SHIPPED | OUTPATIENT
Start: 2019-04-04 | End: 2019-07-17 | Stop reason: ALTCHOICE

## 2019-04-04 RX ORDER — ALPRAZOLAM 0.25 MG/1
0.25 TABLET ORAL
Qty: 30 TABLET | Refills: 0 | Status: SHIPPED | OUTPATIENT
Start: 2019-04-04 | End: 2020-09-11 | Stop reason: SDUPTHER

## 2019-04-25 DIAGNOSIS — I10 BENIGN ESSENTIAL HYPERTENSION: ICD-10-CM

## 2019-04-25 RX ORDER — BISOPROLOL FUMARATE AND HYDROCHLOROTHIAZIDE 5; 6.25 MG/1; MG/1
TABLET ORAL
Qty: 30 TABLET | Refills: 0 | Status: SHIPPED | OUTPATIENT
Start: 2019-04-25 | End: 2020-02-07 | Stop reason: ALTCHOICE

## 2019-06-12 DIAGNOSIS — I10 BENIGN ESSENTIAL HYPERTENSION: ICD-10-CM

## 2019-06-13 RX ORDER — BISOPROLOL FUMARATE AND HYDROCHLOROTHIAZIDE 5; 6.25 MG/1; MG/1
TABLET ORAL
Qty: 90 TABLET | Refills: 1 | Status: SHIPPED | OUTPATIENT
Start: 2019-06-13 | End: 2020-01-24 | Stop reason: SDUPTHER

## 2019-07-17 DIAGNOSIS — K21.00 ESOPHAGITIS, REFLUX: Primary | ICD-10-CM

## 2019-07-17 RX ORDER — PANTOPRAZOLE SODIUM 40 MG/1
40 TABLET, DELAYED RELEASE ORAL DAILY
Qty: 90 TABLET | Refills: 0 | Status: SHIPPED | OUTPATIENT
Start: 2019-07-17 | End: 2019-11-12 | Stop reason: SDUPTHER

## 2019-11-12 DIAGNOSIS — K21.00 ESOPHAGITIS, REFLUX: ICD-10-CM

## 2019-11-13 RX ORDER — PANTOPRAZOLE SODIUM 40 MG/1
TABLET, DELAYED RELEASE ORAL
Qty: 90 TABLET | Refills: 0 | Status: SHIPPED | OUTPATIENT
Start: 2019-11-13 | End: 2020-02-07 | Stop reason: SDUPTHER

## 2019-12-02 DIAGNOSIS — H69.81 ETD (EUSTACHIAN TUBE DYSFUNCTION), RIGHT: ICD-10-CM

## 2019-12-02 RX ORDER — FLUTICASONE PROPIONATE 50 MCG
SPRAY, SUSPENSION (ML) NASAL
Qty: 16 G | Refills: 5 | OUTPATIENT
Start: 2019-12-02

## 2020-01-20 DIAGNOSIS — I10 BENIGN ESSENTIAL HYPERTENSION: ICD-10-CM

## 2020-01-20 RX ORDER — BISOPROLOL FUMARATE AND HYDROCHLOROTHIAZIDE 5; 6.25 MG/1; MG/1
TABLET ORAL
Qty: 90 TABLET | Refills: 0 | OUTPATIENT
Start: 2020-01-20

## 2020-01-24 DIAGNOSIS — I10 BENIGN ESSENTIAL HYPERTENSION: ICD-10-CM

## 2020-01-24 RX ORDER — BISOPROLOL FUMARATE AND HYDROCHLOROTHIAZIDE 5; 6.25 MG/1; MG/1
1 TABLET ORAL DAILY
Qty: 14 TABLET | Refills: 0 | Status: SHIPPED | OUTPATIENT
Start: 2020-01-24 | End: 2020-02-07 | Stop reason: SDUPTHER

## 2020-02-07 ENCOUNTER — OFFICE VISIT (OUTPATIENT)
Dept: FAMILY MEDICINE CLINIC | Facility: CLINIC | Age: 55
End: 2020-02-07
Payer: COMMERCIAL

## 2020-02-07 VITALS
DIASTOLIC BLOOD PRESSURE: 88 MMHG | HEIGHT: 72 IN | BODY MASS INDEX: 27.63 KG/M2 | HEART RATE: 68 BPM | WEIGHT: 204 LBS | TEMPERATURE: 97.6 F | SYSTOLIC BLOOD PRESSURE: 138 MMHG

## 2020-02-07 DIAGNOSIS — Z13.0 SCREENING FOR IRON DEFICIENCY ANEMIA: ICD-10-CM

## 2020-02-07 DIAGNOSIS — Z12.11 SCREENING FOR MALIGNANT NEOPLASM OF COLON: ICD-10-CM

## 2020-02-07 DIAGNOSIS — E78.5 DYSLIPIDEMIA: ICD-10-CM

## 2020-02-07 DIAGNOSIS — I10 BENIGN ESSENTIAL HYPERTENSION: Primary | ICD-10-CM

## 2020-02-07 DIAGNOSIS — F52.4 PREMATURE EJACULATION: ICD-10-CM

## 2020-02-07 DIAGNOSIS — K21.00 ESOPHAGITIS, REFLUX: ICD-10-CM

## 2020-02-07 DIAGNOSIS — F41.9 ANXIETY: ICD-10-CM

## 2020-02-07 PROCEDURE — 3075F SYST BP GE 130 - 139MM HG: CPT | Performed by: FAMILY MEDICINE

## 2020-02-07 PROCEDURE — 99214 OFFICE O/P EST MOD 30 MIN: CPT | Performed by: FAMILY MEDICINE

## 2020-02-07 PROCEDURE — 3008F BODY MASS INDEX DOCD: CPT | Performed by: FAMILY MEDICINE

## 2020-02-07 PROCEDURE — 3079F DIAST BP 80-89 MM HG: CPT | Performed by: FAMILY MEDICINE

## 2020-02-07 PROCEDURE — 1036F TOBACCO NON-USER: CPT | Performed by: FAMILY MEDICINE

## 2020-02-07 RX ORDER — FLUOXETINE 10 MG/1
10 TABLET, FILM COATED ORAL DAILY
Qty: 30 TABLET | Refills: 1 | Status: SHIPPED | OUTPATIENT
Start: 2020-02-07 | End: 2020-06-08

## 2020-02-07 RX ORDER — BISOPROLOL FUMARATE AND HYDROCHLOROTHIAZIDE 5; 6.25 MG/1; MG/1
1 TABLET ORAL DAILY
Qty: 90 TABLET | Refills: 1 | Status: SHIPPED | OUTPATIENT
Start: 2020-02-07 | End: 2020-08-06

## 2020-02-07 RX ORDER — PANTOPRAZOLE SODIUM 40 MG/1
40 TABLET, DELAYED RELEASE ORAL DAILY
Qty: 90 TABLET | Refills: 1 | Status: SHIPPED | OUTPATIENT
Start: 2020-02-07 | End: 2020-09-28

## 2020-02-07 NOTE — PROGRESS NOTES
BMI Counseling: Body mass index is 27 67 kg/m²  The BMI is above normal  Nutrition recommendations include decreasing portion sizes, encouraging healthy choices of fruits and vegetables, consuming healthier snacks and moderation in carbohydrate intake  Exercise recommendations include moderate physical activity 150 minutes/week and exercising 3-5 times per week  No pharmacotherapy was ordered  Assessment/Plan:  Benign essential hypertension  Blood pressure suboptimally controlled today  He has not had his Ziac today  He does not take it every day  We stressed the importance of compliance with his medication  He is asked to get CBC CMP and lipids as well  Esophagitis, reflux  Continue with Protonix  No dysphagia weight loss anorexia night sweats or other worrisome symptom  Anxiety  We are going to start him on fluoxetine 10 mg daily  He is asked to call in 3 weeks with report of condition  We did review potential side effects with him  He is in agreement with this plan  Premature ejaculation  We are going to give him some fluoxetine to try  Will call with report in 3 weeks as noted above  Diagnoses and all orders for this visit:    Benign essential hypertension  -     bisoprolol-hydrochlorothiazide (ZIAC) 5-6 25 MG per tablet; Take 1 tablet by mouth daily  -     Comprehensive metabolic panel; Future  -     Comprehensive metabolic panel    Esophagitis, reflux  -     pantoprazole (PROTONIX) 40 mg tablet; Take 1 tablet (40 mg total) by mouth daily    Screening for malignant neoplasm of colon  -     Ambulatory referral to Gastroenterology; Future    Dyslipidemia  -     Lipid panel; Future  -     Lipid panel    Screening for iron deficiency anemia  -     CBC; Future  -     CBC    Anxiety  -     FLUoxetine (PROzac) 10 MG tablet; Take 1 tablet (10 mg total) by mouth daily    Premature ejaculation          Premature ejaculation issues  Driving through [employer] provoking  [place] also  Subjective:   Chief Complaint   Patient presents with    Blood Pressure Check     here for med check  will enter labs for quest        Patient ID: Yaw Early is a 49 y o  male  HPI  The patient presents today for follow-up of essential hypertension as well as gastroesophageal reflux disease  Additionally he has other complaints which include premature ejaculation which has developed recently  He has also noted increased anxiety  He [place]  He also notes that he had an episode of chest pain during an argument with his wife  This seemed to resolve afterwards though he did take a nitroglycerin that he had around  States that the next day he went on the treadmill and worked hard for 30 minutes without any production of chest pain  He has had no recent cardiology evaluation  He does admit that he is not compliant with his blood pressure medication on a daily basis  He is not no headache diplopia focal neurologic symptom  No exertional chest pain shortness of breath PND orthopnea edema X cetera  The following portions of the patient's history were reviewed and updated as appropriate: allergies, current medications, past family history, past medical history, past social history, past surgical history and problem list     Review of Systems   Constitution: Negative  Cardiovascular: Positive for chest pain  Negative for irregular heartbeat, leg swelling, orthopnea and paroxysmal nocturnal dyspnea  Had CP during an argument  Improved after a SL NTG  Went on treadmill for 30 mins  The next day without sx  Respiratory: Negative for cough, shortness of breath and wheezing  Endocrine: Negative  Hematologic/Lymphatic: Negative  Gastrointestinal: Negative for constipation, diarrhea and hematochezia          Change in pattern with dietary changes   Genitourinary:        See HPI   Neurological: Negative for dizziness, headaches and light-headedness  Psychiatric/Behavioral: Negative for depression  The patient is not nervous/anxious  Objective:    Physical Exam   Constitutional: He is oriented to person, place, and time  He appears well-developed and well-nourished  Mildly overweight and in NAD   HENT:   Mouth/Throat: Oropharynx is clear and moist    Eyes: Right eye exhibits no discharge  Left eye exhibits no discharge  Neck: No JVD present  No thyromegaly present  Cardiovascular: Normal rate, regular rhythm and normal heart sounds  Pulmonary/Chest: Effort normal and breath sounds normal  No respiratory distress  He has no wheezes  He has no rales  Musculoskeletal: He exhibits no edema  Lymphadenopathy:     He has no cervical adenopathy  Neurological: He is alert and oriented to person, place, and time  Psychiatric: He has a normal mood and affect  Thought content normal    Nursing note and vitals reviewed

## 2020-02-07 NOTE — ASSESSMENT & PLAN NOTE
We are going to start him on fluoxetine 10 mg daily  He is asked to call in 3 weeks with report of condition  We did review potential side effects with him  He is in agreement with this plan

## 2020-02-25 DIAGNOSIS — K21.00 ESOPHAGITIS, REFLUX: ICD-10-CM

## 2020-02-25 RX ORDER — PANTOPRAZOLE SODIUM 40 MG/1
TABLET, DELAYED RELEASE ORAL
Qty: 90 TABLET | Refills: 0 | OUTPATIENT
Start: 2020-02-25

## 2020-05-20 ENCOUNTER — TELEMEDICINE (OUTPATIENT)
Dept: FAMILY MEDICINE CLINIC | Facility: CLINIC | Age: 55
End: 2020-05-20
Payer: COMMERCIAL

## 2020-05-20 DIAGNOSIS — J02.9 EXUDATIVE PHARYNGITIS: Primary | ICD-10-CM

## 2020-05-20 PROCEDURE — 99214 OFFICE O/P EST MOD 30 MIN: CPT | Performed by: FAMILY MEDICINE

## 2020-05-20 RX ORDER — AZITHROMYCIN 250 MG/1
TABLET, FILM COATED ORAL
Qty: 6 TABLET | Refills: 0 | Status: SHIPPED | OUTPATIENT
Start: 2020-05-20 | End: 2020-05-24

## 2020-06-06 DIAGNOSIS — F41.9 ANXIETY: ICD-10-CM

## 2020-06-08 RX ORDER — FLUOXETINE 10 MG/1
TABLET, FILM COATED ORAL
Qty: 30 TABLET | Refills: 1 | Status: SHIPPED | OUTPATIENT
Start: 2020-06-08 | End: 2020-07-07

## 2020-07-07 DIAGNOSIS — F41.9 ANXIETY: ICD-10-CM

## 2020-07-07 RX ORDER — FLUOXETINE 10 MG/1
TABLET, FILM COATED ORAL
Qty: 30 TABLET | Refills: 1 | Status: SHIPPED | OUTPATIENT
Start: 2020-07-07 | End: 2020-07-10

## 2020-07-10 DIAGNOSIS — F41.9 ANXIETY: ICD-10-CM

## 2020-07-10 RX ORDER — FLUOXETINE 10 MG/1
TABLET, FILM COATED ORAL
Qty: 30 TABLET | Refills: 1 | Status: SHIPPED | OUTPATIENT
Start: 2020-07-10 | End: 2020-08-31

## 2020-07-29 LAB
ALBUMIN SERPL-MCNC: 4.7 G/DL (ref 3.6–5.1)
ALBUMIN/GLOB SERPL: 1.8 (CALC) (ref 1–2.5)
ALP SERPL-CCNC: 78 U/L (ref 35–144)
ALT SERPL-CCNC: 58 U/L (ref 9–46)
AST SERPL-CCNC: 30 U/L (ref 10–35)
BILIRUB SERPL-MCNC: 0.8 MG/DL (ref 0.2–1.2)
BUN SERPL-MCNC: 14 MG/DL (ref 7–25)
BUN/CREAT SERPL: ABNORMAL (CALC) (ref 6–22)
CALCIUM SERPL-MCNC: 10.1 MG/DL (ref 8.6–10.3)
CHLORIDE SERPL-SCNC: 102 MMOL/L (ref 98–110)
CHOLEST SERPL-MCNC: 223 MG/DL
CHOLEST/HDLC SERPL: 6.4 (CALC)
CO2 SERPL-SCNC: 30 MMOL/L (ref 20–32)
CREAT SERPL-MCNC: 1.04 MG/DL (ref 0.7–1.33)
ERYTHROCYTE [DISTWIDTH] IN BLOOD BY AUTOMATED COUNT: 13.3 % (ref 11–15)
GLOBULIN SER CALC-MCNC: 2.6 G/DL (CALC) (ref 1.9–3.7)
GLUCOSE SERPL-MCNC: 112 MG/DL (ref 65–99)
HBA1C MFR BLD: 5.6 % OF TOTAL HGB
HCT VFR BLD AUTO: 52.6 % (ref 38.5–50)
HDLC SERPL-MCNC: 35 MG/DL
HGB BLD-MCNC: 17.7 G/DL (ref 13.2–17.1)
MCH RBC QN AUTO: 30.1 PG (ref 27–33)
MCHC RBC AUTO-ENTMCNC: 33.7 G/DL (ref 32–36)
MCV RBC AUTO: 89.5 FL (ref 80–100)
NONHDLC SERPL-MCNC: 188 MG/DL (CALC)
PLATELET # BLD AUTO: 257 THOUSAND/UL (ref 140–400)
PMV BLD REES-ECKER: 10.7 FL (ref 7.5–12.5)
POTASSIUM SERPL-SCNC: 4.6 MMOL/L (ref 3.5–5.3)
PROT SERPL-MCNC: 7.3 G/DL (ref 6.1–8.1)
RBC # BLD AUTO: 5.88 MILLION/UL (ref 4.2–5.8)
REF LAB TEST NAME: NORMAL
REF LAB TEST: NORMAL
SL AMB CLIENT CONTACT: NORMAL
SL AMB EGFR AFRICAN AMERICAN: 95 ML/MIN/1.73M2
SL AMB EGFR NON AFRICAN AMERICAN: 82 ML/MIN/1.73M2
SODIUM SERPL-SCNC: 138 MMOL/L (ref 135–146)
TRIGL SERPL-MCNC: 478 MG/DL
WBC # BLD AUTO: 8.1 THOUSAND/UL (ref 3.8–10.8)

## 2020-08-04 ENCOUNTER — HOSPITAL ENCOUNTER (EMERGENCY)
Facility: HOSPITAL | Age: 55
Discharge: HOME | End: 2020-08-04
Attending: EMERGENCY MEDICINE | Admitting: EMERGENCY MEDICINE
Payer: COMMERCIAL

## 2020-08-04 ENCOUNTER — APPOINTMENT (EMERGENCY)
Dept: RADIOLOGY | Facility: HOSPITAL | Age: 55
End: 2020-08-04
Payer: COMMERCIAL

## 2020-08-04 VITALS
RESPIRATION RATE: 12 BRPM | OXYGEN SATURATION: 96 % | DIASTOLIC BLOOD PRESSURE: 91 MMHG | HEIGHT: 72 IN | HEART RATE: 67 BPM | TEMPERATURE: 98.8 F | WEIGHT: 210 LBS | BODY MASS INDEX: 28.44 KG/M2 | SYSTOLIC BLOOD PRESSURE: 135 MMHG

## 2020-08-04 DIAGNOSIS — I49.3 PVC'S (PREMATURE VENTRICULAR CONTRACTIONS): ICD-10-CM

## 2020-08-04 DIAGNOSIS — R00.2 PALPITATIONS: ICD-10-CM

## 2020-08-04 DIAGNOSIS — R07.9 CHEST PAIN: Primary | ICD-10-CM

## 2020-08-04 LAB
ALBUMIN SERPL BCP-MCNC: 4.1 G/DL (ref 3.5–5)
ALP SERPL-CCNC: 77 U/L (ref 46–116)
ALT SERPL W P-5'-P-CCNC: 67 U/L (ref 12–78)
ANION GAP SERPL CALCULATED.3IONS-SCNC: 4 MMOL/L (ref 4–13)
AST SERPL W P-5'-P-CCNC: 26 U/L (ref 5–45)
ATRIAL RATE: 57 BPM
BASOPHILS # BLD AUTO: 0.07 THOUSANDS/ΜL (ref 0–0.1)
BASOPHILS NFR BLD AUTO: 1 % (ref 0–1)
BILIRUB SERPL-MCNC: 0.84 MG/DL (ref 0.2–1)
BUN SERPL-MCNC: 12 MG/DL (ref 5–25)
CALCIUM SERPL-MCNC: 9.8 MG/DL (ref 8.3–10.1)
CHLORIDE SERPL-SCNC: 102 MMOL/L (ref 100–108)
CO2 SERPL-SCNC: 30 MMOL/L (ref 21–32)
CREAT SERPL-MCNC: 1.05 MG/DL (ref 0.6–1.3)
EOSINOPHIL # BLD AUTO: 0.2 THOUSAND/ΜL (ref 0–0.61)
EOSINOPHIL NFR BLD AUTO: 2 % (ref 0–6)
ERYTHROCYTE [DISTWIDTH] IN BLOOD BY AUTOMATED COUNT: 12.4 % (ref 11.6–15.1)
GFR SERPL CREATININE-BSD FRML MDRD: 81 ML/MIN/1.73SQ M
GLUCOSE SERPL-MCNC: 101 MG/DL (ref 65–140)
HCT VFR BLD AUTO: 51.4 % (ref 36.5–49.3)
HGB BLD-MCNC: 18 G/DL (ref 12–17)
IMM GRANULOCYTES # BLD AUTO: 0.02 THOUSAND/UL (ref 0–0.2)
IMM GRANULOCYTES NFR BLD AUTO: 0 % (ref 0–2)
LYMPHOCYTES # BLD AUTO: 2.71 THOUSANDS/ΜL (ref 0.6–4.47)
LYMPHOCYTES NFR BLD AUTO: 32 % (ref 14–44)
MCH RBC QN AUTO: 30.7 PG (ref 26.8–34.3)
MCHC RBC AUTO-ENTMCNC: 35 G/DL (ref 31.4–37.4)
MCV RBC AUTO: 88 FL (ref 82–98)
MONOCYTES # BLD AUTO: 0.75 THOUSAND/ΜL (ref 0.17–1.22)
MONOCYTES NFR BLD AUTO: 9 % (ref 4–12)
NEUTROPHILS # BLD AUTO: 4.82 THOUSANDS/ΜL (ref 1.85–7.62)
NEUTS SEG NFR BLD AUTO: 56 % (ref 43–75)
NRBC BLD AUTO-RTO: 0 /100 WBCS
P AXIS: 69 DEGREES
PLATELET # BLD AUTO: 246 THOUSANDS/UL (ref 149–390)
PMV BLD AUTO: 10.3 FL (ref 8.9–12.7)
POTASSIUM SERPL-SCNC: 4.3 MMOL/L (ref 3.5–5.3)
PR INTERVAL: 200 MS
PROT SERPL-MCNC: 8.3 G/DL (ref 6.4–8.2)
QRS AXIS: -9 DEGREES
QRSD INTERVAL: 78 MS
QT INTERVAL: 418 MS
QTC INTERVAL: 406 MS
RBC # BLD AUTO: 5.86 MILLION/UL (ref 3.88–5.62)
SODIUM SERPL-SCNC: 136 MMOL/L (ref 136–145)
T WAVE AXIS: 17 DEGREES
TROPONIN I SERPL-MCNC: <0.02 NG/ML
TROPONIN I SERPL-MCNC: <0.02 NG/ML
VENTRICULAR RATE: 57 BPM
WBC # BLD AUTO: 8.57 THOUSAND/UL (ref 4.31–10.16)

## 2020-08-04 PROCEDURE — 99285 EMERGENCY DEPT VISIT HI MDM: CPT

## 2020-08-04 PROCEDURE — 71045 X-RAY EXAM CHEST 1 VIEW: CPT

## 2020-08-04 PROCEDURE — 84484 ASSAY OF TROPONIN QUANT: CPT | Performed by: EMERGENCY MEDICINE

## 2020-08-04 PROCEDURE — 99285 EMERGENCY DEPT VISIT HI MDM: CPT | Performed by: EMERGENCY MEDICINE

## 2020-08-04 PROCEDURE — 85025 COMPLETE CBC W/AUTO DIFF WBC: CPT | Performed by: EMERGENCY MEDICINE

## 2020-08-04 PROCEDURE — 80053 COMPREHEN METABOLIC PANEL: CPT | Performed by: EMERGENCY MEDICINE

## 2020-08-04 PROCEDURE — 93005 ELECTROCARDIOGRAM TRACING: CPT

## 2020-08-04 PROCEDURE — 36415 COLL VENOUS BLD VENIPUNCTURE: CPT

## 2020-08-04 PROCEDURE — 93010 ELECTROCARDIOGRAM REPORT: CPT | Performed by: INTERNAL MEDICINE

## 2020-08-04 NOTE — DISCHARGE INSTRUCTIONS
Return to the emergency department if you experience worsening of symptoms including worsening pain, difficulty breathing, episodes of lightheadedness or losing consciousness  Follow-up with her primary care physician as well as cardiologist for possible medication adjustment given persistent hypertension, palpitations, and shortness of breath  Continue to take your medications as prescribed until you follow-up

## 2020-08-04 NOTE — ED ATTENDING ATTESTATION
Final Diagnosis:  1  Chest pain    2  PVC's (premature ventricular contractions)    3  Palpitations           I, Tom Calvillo MD, saw and evaluated the patient  All available labs and X-rays were ordered by me or the resident and have been reviewed by myself  I discussed the patient with the resident / non-physician and agree with the resident's / non-physician practitioner's findings and plan as documented in the resident's / non-physician practicitioner's note, except where noted  At this point, I agree with the current assessment done in the ED  I was present during key portions of all procedures performed unless otherwise stated  Chief Complaint   Patient presents with    Chest Pain     Pt reports L sided chest pain with SOB for the past week as well as having uncontrolled HTN even with taking daily BP medications     This is a 49 y o  male presenting for evaluation of 1 week of intermittent CP that is reproducible with DELCID when going up steps, sometimes feeling anxiety and palpitations  +LH at times w/o syncope  Same episodes occur randomly at rest   Non-pleuritic  Non-positional related  No nausea, vomiting, fevers, chills  Denies any upper respiratory tract infection symptoms (cough, congestion, rhinorrhea, sore throat)  Denies any urinary tract infection symptoms (burning, itching, pain, blood, frequency)  No weight gain, swelling, calf pain  BP at home has been elevated  Doubling bisprostol-HCTZ at home has helped BP a bit  Admits that anxiety causes similar symptoms previously     PMH:   has a past medical history of GERD (gastroesophageal reflux disease) and Hypertension  HLD/DLD  anxiety    PSH:   has a past surgical history that includes Colonoscopy and FACIAL/NECK BIOPSY (N/A, 8/14/2018)      Social:  Social History     Substance and Sexual Activity   Alcohol Use Yes    Frequency: Monthly or less    Drinks per session: 1 or 2     Social History     Tobacco Use   Smoking Status Never Smoker   Smokeless Tobacco Never Used     Social History     Substance and Sexual Activity   Drug Use No     PE:  Vitals:    08/04/20 1456 08/04/20 1500 08/04/20 1800   BP: 152/85 152/85 135/91   BP Location: Right arm Right arm Right arm   Pulse: 58 64 67   Resp: 18 13 12   Temp: 98 8 °F (37 1 °C)     TempSrc: Oral     SpO2: 97% 97% 96%   Weight: 95 3 kg (210 lb)     Height: 6' (1 829 m)     General: VSS, NAD, awake, alert  Well-nourished, well-developed  Appears stated age  Speaking normally in full sentences  Head: Normocephalic, atraumatic, nontender  Eyes: PERRL, EOM-I  No diplopia  No hyphema  No subconjunctival hemorrhages  Symmetrical lids  ENT: Atraumatic external nose and ears  MMM  No malocclusion  No stridor  Normal phonation  No drooling  Normal swallowing  Neck: Symmetric, trachea midline  No JVD  CV: RRR  +S1/S2  No murmurs or gallops  Peripheral pulses +2 throughout  No chest wall tenderness  Occasional PVCs on the monitor  Lungs:   Unlabored No retractions  CTAB, lungs sounds equal bilateral    No tachypnea  Abd: +BS, soft, NT/ND    MSK:   FROM   Back:   No rashes  Skin: Dry, intact  Neuro: AAOx3, GCS 15, CN II-XII grossly intact  Motor grossly intact  Psychiatric/Behavioral: Appropriate mood and affect   Exam: deferred  A:  - CP  - Anxiety  - HTN  P:  - Discussed diet, exercise, sleep hygiene importance  - Will do cardiac workup  - PVCs could be causing distress; he did actually increase his dose of his BB inadvertantly (thinking it was an ACEi)  Discussed following up with cards (has appointment in a week) for medications changes  - Single trop as symptoms are there for a week  - Doubt ACS given duration of symptoms  Might do delta if elevated HEART score  - f/u cards for medication decisions  - 13 point ROS was performed and all are normal unless stated in the history above  - Nursing note reviewed  Vitals reviewed     - Orders placed by myself and/or advanced practitioner / resident     - Previous chart was reviewed  - No language barrier    - History obtained from patient  - There are no limitations to the history obtained  - Critical care time: Not applicable for this patient  Code Status: No Order  Advance Directive and Living Will:      Power of :    POLST:      Medications - No data to display  XR chest 1 view portable   ED Interpretation   No pna      Final Result      No acute cardiopulmonary disease              Workstation performed: [id number]           Orders Placed This Encounter   Procedures    ED ECG Documentation Only    ED ECG Documentation Only    XR chest 1 view portable    CBC and differential    Comprehensive metabolic panel    Troponin I    Troponin I    Continuous cardiac monitoring    Continuous pulse oximetry    EKG RESULTS    ECG 12 lead    ECG 12 lead    ECG 12 lead     Labs Reviewed   CBC AND DIFFERENTIAL - Abnormal       Result Value Ref Range Status    WBC 8 57  4 31 - 10 16 Thousand/uL Final    RBC 5 86 (*) 3 88 - 5 62 Million/uL Final    Hemoglobin 18 0 (*) 12 0 - 17 0 g/dL Final    Hematocrit 51 4 (*) 36 5 - 49 3 % Final    MCV 88  82 - 98 fL Final    MCH 30 7  26 8 - 34 3 pg Final    MCHC 35 0  31 4 - 37 4 g/dL Final    RDW 12 4  11 6 - 15 1 % Final    MPV 10 3  8 9 - 12 7 fL Final    Platelets 591  855 - 390 Thousands/uL Final    nRBC 0  /100 WBCs Final    Neutrophils Relative 56  43 - 75 % Final    Immat GRANS % 0  0 - 2 % Final    Lymphocytes Relative 32  14 - 44 % Final    Monocytes Relative 9  4 - 12 % Final    Eosinophils Relative 2  0 - 6 % Final    Basophils Relative 1  0 - 1 % Final    Neutrophils Absolute 4 82  1 85 - 7 62 Thousands/µL Final    Immature Grans Absolute 0 02  0 00 - 0 20 Thousand/uL Final    Lymphocytes Absolute 2 71  0 60 - 4 47 Thousands/µL Final    Monocytes Absolute 0 75  0 17 - 1 22 Thousand/µL Final    Eosinophils Absolute 0 20  0 00 - 0 61 Thousand/µL Final    Basophils Absolute 0 07  0 00 - 0 10 Thousands/µL Final   COMPREHENSIVE METABOLIC PANEL - Abnormal    Sodium 136  136 - 145 mmol/L Final    Potassium 4 3  3 5 - 5 3 mmol/L Final    Chloride 102  100 - 108 mmol/L Final    CO2 30  21 - 32 mmol/L Final    ANION GAP 4  4 - 13 mmol/L Final    BUN 12  5 - 25 mg/dL Final    Creatinine 1 05  0 60 - 1 30 mg/dL Final    Comment: Standardized to IDMS reference method    Glucose 101  65 - 140 mg/dL Final    Comment: If the patient is fasting, the ADA then defines impaired fasting glucose as > 100 mg/dL and diabetes as > or equal to 123 mg/dL  Specimen collection should occur prior to Sulfasalazine administration due to the potential for falsely depressed results  Specimen collection should occur prior to Sulfapyridine administration due to the potential for falsely elevated results  Calcium 9 8  8 3 - 10 1 mg/dL Final    AST 26  5 - 45 U/L Final    Comment: Specimen collection should occur prior to Sulfasalazine administration due to the potential for falsely depressed results  ALT 67  12 - 78 U/L Final    Comment: Specimen collection should occur prior to Sulfasalazine and/or Sulfapyridine administration due to the potential for falsely depressed results  Alkaline Phosphatase 77  46 - 116 U/L Final    Total Protein 8 3 (*) 6 4 - 8 2 g/dL Final    Albumin 4 1  3 5 - 5 0 g/dL Final    Total Bilirubin 0 84  0 20 - 1 00 mg/dL Final    Comment: Use of this assay is not recommended for patients undergoing treatment with eltrombopag due to the potential for falsely elevated results      eGFR 81  ml/min/1 73sq m Final    Narrative:     [place] guidelines for Chronic Kidney Disease (CKD):     Stage 1 with normal or high GFR (GFR > 90 mL/min/1 73 square meters)    Stage 2 Mild CKD (GFR = 60-89 mL/min/1 73 square meters)    Stage 3A Moderate CKD (GFR = 45-59 mL/min/1 73 square meters)    Stage 3B Moderate CKD (GFR = 30-44 mL/min/1 73 square meters)   Stage 4 Severe CKD (GFR = 15-29 mL/min/1 73 square meters)    Stage 5 End Stage CKD (GFR <15 mL/min/1 73 square meters)  Note: GFR calculation is accurate only with a steady state creatinine   TROPONIN I - Normal    Troponin I <0 02  <=0 04 ng/mL Final    Comment: Siemens Chemistry analyzer 99% cutoff is > 0 04 ng/mL in network labs     o cTnI 99% cutoff is useful only when applied to patients in the clinical setting of myocardial ischemia   o cTnI 99% cutoff should be interpreted in the context of clinical history, ECG findings and possibly cardiac imaging to establish correct diagnosis  o cTnI 99% cutoff may be suggestive but clearly not indicative of a coronary event without the clinical setting of myocardial ischemia  TROPONIN I - Normal    Troponin I <0 02  <=0 04 ng/mL Final    Comment: Siemens Chemistry analyzer 99% cutoff is > 0 04 ng/mL in network labs     o cTnI 99% cutoff is useful only when applied to patients in the clinical setting of myocardial ischemia   o cTnI 99% cutoff should be interpreted in the context of clinical history, ECG findings and possibly cardiac imaging to establish correct diagnosis  o cTnI 99% cutoff may be suggestive but clearly not indicative of a coronary event without the clinical setting of myocardial ischemia  Time reflects when diagnosis was documented in both MDM as applicable and the Disposition within this note     Time User Action Codes Description Comment    8/4/2020  6:38 PM Check, Misha Louis Add [R07 9] Chest pain     8/4/2020  6:38 PM Check, Misha Loius Add [I49 3] PVC's (premature ventricular contractions)     8/4/2020  6:38 PM Check, Misha Louis Add [R00 2] Palpitations       ED Disposition     ED Disposition Condition Date/Time Comment    Discharge Stable Tue Aug 4, 2020  6:38 PM Stuart Marshall discharge to home/self care              Follow-up Information     Follow up With Specialties Details Why Contact Info Additional Information    Yecenia De Dios MD [facility] Medicine Schedule an appointment as soon as possible for a visit in 1 week  [facility] [address] Emergency Department Emergency Medicine  If symptoms worsen [address]  [phone]  ED, [address], [place], [place], [address]   [phone]        Discharge Medication List as of 8/4/2020  7:21 PM      CONTINUE these medications which have NOT CHANGED    Details   ALPRAZolam (XANAX) 0 25 mg tablet Take 1 tablet (0 25 mg total) by mouth daily at bedtime as needed for anxiety, Starting Thu 4/4/2019, Normal      bisoprolol-hydrochlorothiazide (ZIAC) 5-6 25 MG per tablet Take 1 tablet by mouth daily, Starting Fri 2/7/2020, Until Wed 8/5/2020, Normal      FLUoxetine (PROzac) 10 MG tablet TAKE ONE TABLET BY MOUTH EVERY DAY, Normal      pantoprazole (PROTONIX) 40 mg tablet Take 1 tablet (40 mg total) by mouth daily, Starting Fri 2/7/2020, Normal           No discharge procedures on file  Prior to Admission Medications   Prescriptions Last Dose Informant Patient Reported? Taking? ALPRAZolam (XANAX) 0 25 mg tablet   No Yes   Sig: Take 1 tablet (0 25 mg total) by mouth daily at bedtime as needed for anxiety   FLUoxetine (PROzac) 10 MG tablet   No Yes   Sig: TAKE ONE TABLET BY MOUTH EVERY DAY   bisoprolol-hydrochlorothiazide (ZIAC) 5-6 25 MG per tablet 8/4/2020 at Unknown time  No Yes   Sig: Take 1 tablet by mouth daily   pantoprazole (PROTONIX) 40 mg tablet   No Yes   Sig: Take 1 tablet (40 mg total) by mouth daily      Facility-Administered Medications: None       Portions of the record may have been created with voice recognition software  Occasional wrong word or "sound a like" substitutions may have occurred due to the inherent limitations of voice recognition software  Read the chart carefully and recognize, using context, where substitutions have occurred      Electronically signed by:  Jaskaran Quinones

## 2020-08-04 NOTE — ED PROVIDER NOTES
History  Chief Complaint   Patient presents with    Chest Pain     Pt reports L sided chest pain with SOB for the past week as well as having uncontrolled HTN even with taking daily BP medications     49year old male with hypertension, dyslipidemia, and esophagitis presents for evaluation of chest pain  Patient states the symptoms have been intermittent for the past week  Are associated with occasional palpitations as well as dyspnea on exertion  Patient states he gets short of breath after walking up stairs  He also endorses increased anxiety and occasional lightheadedness without syncope  He is not sure of this is related to his heart or his anxiety  He experiences the chest pain and palpitations with exertion as well as at rest   The chest pain he describes as a mild ache on the left side  It is nonradiating and nonpleuritic in nature  No associated diaphoresis  His pain is not related to eating  No recent illnesses  No headache, fever, chills, neck pain, neck stiffness, abdominal pain, nausea, vomiting, or diarrhea  No known sick contacts  Patient is also concerned as his blood pressure has been elevated  He has been taking double dose of his antihypertensives  No prior history of DVT or PE  No prolonged immobilization or hospitalization  No chronic steroid use  Prior to Admission Medications   Prescriptions Last Dose Informant Patient Reported? Taking?    ALPRAZolam (XANAX) 0 25 mg tablet   No Yes   Sig: Take 1 tablet (0 25 mg total) by mouth daily at bedtime as needed for anxiety   FLUoxetine (PROzac) 10 MG tablet   No Yes   Sig: TAKE ONE TABLET BY MOUTH EVERY DAY   bisoprolol-hydrochlorothiazide (ZIAC) 5-6 25 MG per tablet 8/4/2020 at Unknown time  No Yes   Sig: Take 1 tablet by mouth daily   pantoprazole (PROTONIX) 40 mg tablet   No Yes   Sig: Take 1 tablet (40 mg total) by mouth daily      Facility-Administered Medications: None       Past Medical History:   Diagnosis Date    GERD (gastroesophageal reflux disease)     Hypertension        Past Surgical History:   Procedure Laterality Date    COLONOSCOPY      FACIAL/NECK BIOPSY N/A 8/14/2018    Procedure: EXCISION BIOPSY LESION POSTERIOR NECK;  Surgeon: Roni Stephenson MD;  Location: [facility];  Service: General       Family History   Problem Relation Age of Onset    Depression Son      I have reviewed and agree with the history as documented  E-Cigarette/Vaping     E-Cigarette/Vaping Substances     Social History     Tobacco Use    Smoking status: Never Smoker    Smokeless tobacco: Never Used   Substance Use Topics    Alcohol use: Yes     Frequency: Monthly or less     Drinks per session: 1 or 2    Drug use: No        Review of Systems   Constitutional: Negative for activity change, appetite change, chills, fatigue, fever and unexpected weight change  HENT: Negative for congestion, ear discharge, ear pain, nosebleeds, postnasal drip, rhinorrhea, sinus pressure, sinus pain, sore throat and tinnitus  Eyes: Negative for photophobia, pain, discharge, redness, itching and visual disturbance  Respiratory: Positive for shortness of breath  Negative for cough, choking, chest tightness and wheezing  Cardiovascular: Positive for chest pain and palpitations  Negative for leg swelling  Gastrointestinal: Negative for abdominal pain, blood in stool, constipation, diarrhea, nausea and vomiting  Genitourinary: Negative for dysuria, enuresis, flank pain and hematuria  Musculoskeletal: Negative for myalgias, neck pain and neck stiffness  Skin: Negative for color change, pallor, rash and wound  Neurological: Positive for light-headedness  Negative for dizziness, seizures, syncope, numbness and headaches  Hematological: Negative  Psychiatric/Behavioral: Negative          Physical Exam  ED Triage Vitals [08/04/20 1456]   Temperature Pulse Respirations Blood Pressure SpO2   98 8 °F (37 1 °C) 58 18 152/85 97 %      Temp Source Heart Rate Source Patient Position - Orthostatic VS BP Location FiO2 (%)   Oral Monitor Lying Right arm --      Pain Score       2             Orthostatic Vital Signs  Vitals:    08/04/20 1456 08/04/20 1500 08/04/20 1800   BP: 152/85 152/85 135/91   Pulse: 58 64 67   Patient Position - Orthostatic VS: Lying Lying Lying       Physical Exam  Vitals signs and nursing note reviewed  Constitutional:       General: He is not in acute distress  Appearance: He is well-developed  He is not diaphoretic  HENT:      Head: Normocephalic and atraumatic  Nose: Nose normal    Eyes:      Conjunctiva/sclera: Conjunctivae normal       Pupils: Pupils are equal, round, and reactive to light  Neck:      Musculoskeletal: Normal range of motion and neck supple  Vascular: No JVD  Trachea: No tracheal deviation  Cardiovascular:      Rate and Rhythm: Regular rhythm  Bradycardia present  Pulses:           Carotid pulses are 2+ on the right side and 2+ on the left side  Radial pulses are 2+ on the right side and 2+ on the left side  Dorsalis pedis pulses are 2+ on the right side and 2+ on the left side  Posterior tibial pulses are 2+ on the right side and 2+ on the left side  Heart sounds: Normal heart sounds  No murmur  Pulmonary:      Effort: Pulmonary effort is normal  No respiratory distress  Breath sounds: Normal breath sounds  No stridor  No wheezing or rales  Chest:      Chest wall: No tenderness  Abdominal:      General: Bowel sounds are normal  There is no distension  Palpations: Abdomen is soft  Tenderness: There is no abdominal tenderness  There is no guarding or rebound  Musculoskeletal: Normal range of motion  General: No tenderness or deformity  Skin:     General: Skin is warm and dry  Capillary Refill: Capillary refill takes less than 2 seconds  Coloration: Skin is not pale  Findings: No erythema or rash  Neurological:      Mental Status: He is alert and oriented to person, place, and time  Cranial Nerves: No cranial nerve deficit  Sensory: No sensory deficit  Motor: No abnormal muscle tone  Coordination: Coordination normal       Deep Tendon Reflexes: Reflexes normal    Psychiatric:         Mood and Affect: Mood is anxious           Behavior: Behavior normal          ED Medications  Medications - No data to display    Diagnostic Studies  Results Reviewed     Procedure Component Value Units Date/Time    Troponin I [[id number]]  (Normal) Collected:  08/04/20 1805    Lab Status:  Final result Specimen:  Blood from Arm, Right Updated:  08/04/20 1853     Troponin I <0 02 ng/mL     Troponin I [[id number]]  (Normal) Collected:  08/04/20 1500    Lab Status:  Final result Specimen:  Blood from Arm, Left Updated:  08/04/20 1553     Troponin I <0 02 ng/mL     Comprehensive metabolic panel [[id number]]  (Abnormal) Collected:  08/04/20 1500    Lab Status:  Final result Specimen:  Blood from Arm, Left Updated:  08/04/20 1528     Sodium 136 mmol/L      Potassium 4 3 mmol/L      Chloride 102 mmol/L      CO2 30 mmol/L      ANION GAP 4 mmol/L      BUN 12 mg/dL      Creatinine 1 05 mg/dL      Glucose 101 mg/dL      Calcium 9 8 mg/dL      AST 26 U/L      ALT 67 U/L      Alkaline Phosphatase 77 U/L      Total Protein 8 3 g/dL      Albumin 4 1 g/dL      Total Bilirubin 0 84 mg/dL      eGFR 81 ml/min/1 73sq m     Narrative:       Goldy guidelines for Chronic Kidney Disease (CKD):     Stage 1 with normal or high GFR (GFR > 90 mL/min/1 73 square meters)    Stage 2 Mild CKD (GFR = 60-89 mL/min/1 73 square meters)    Stage 3A Moderate CKD (GFR = 45-59 mL/min/1 73 square meters)    Stage 3B Moderate CKD (GFR = 30-44 mL/min/1 73 square meters)    Stage 4 Severe CKD (GFR = 15-29 mL/min/1 73 square meters)    Stage 5 End Stage CKD (GFR <15 mL/min/1 73 square meters)  Note: GFR calculation is accurate only with a steady state creatinine    CBC and differential [[id number]]  (Abnormal) Collected:  08/04/20 1500    Lab Status:  Final result Specimen:  Blood from Arm, Left Updated:  08/04/20 1510     WBC 8 57 Thousand/uL      RBC 5 86 Million/uL      Hemoglobin 18 0 g/dL      Hematocrit 51 4 %      MCV 88 fL      MCH 30 7 pg      MCHC 35 0 g/dL      RDW 12 4 %      MPV 10 3 fL      Platelets 163 Thousands/uL      nRBC 0 /100 WBCs      Neutrophils Relative 56 %      Immat GRANS % 0 %      Lymphocytes Relative 32 %      Monocytes Relative 9 %      Eosinophils Relative 2 %      Basophils Relative 1 %      Neutrophils Absolute 4 82 Thousands/µL      Immature Grans Absolute 0 02 Thousand/uL      Lymphocytes Absolute 2 71 Thousands/µL      Monocytes Absolute 0 75 Thousand/µL      Eosinophils Absolute 0 20 Thousand/µL      Basophils Absolute 0 07 Thousands/µL                  XR chest 1 view portable   ED Interpretation by Ann Antonio MD (08/04 1714)   No pna      Final Result by Barbara Moore MD (08/04 1538)      No acute cardiopulmonary disease              Workstation performed: [id number]               Procedures  ECG 12 Lead Documentation Only    Date/Time: 8/4/2020 3:23 PM  Performed by: Song Meade MD  Authorized by: Song Meade MD     Indications / Diagnosis:  Cp, sob  ECG reviewed by me, the ED Provider: yes    Patient location:  ED  Previous ECG:     Previous ECG:  Compared to current    Similarity:  No change  Interpretation:     Interpretation: non-specific    Rate:     ECG rate:  57    ECG rate assessment: bradycardic    Rhythm:     Rhythm: sinus rhythm    Ectopy:     Ectopy: none    QRS:     QRS axis:  Normal  Conduction:     Conduction: normal    ST segments:     ST segments:  Normal  T waves:     T waves: normal      ECG 12 Lead Documentation Only    Date/Time: 8/4/2020 7:24 PM  Performed by: Song Meade MD  Authorized by: Song Meade MD     Indications / Diagnosis: Cp  ECG reviewed by me, the ED Provider: yes    Patient location:  ED  Previous ECG:     Previous ECG:  Compared to current    Similarity:  No change  Interpretation:     Interpretation: abnormal    Rate:     ECG rate:  57    ECG rate assessment: bradycardic    Rhythm:     Rhythm: sinus rhythm    Ectopy:     Ectopy: none    QRS:     QRS axis:  Normal  Conduction:     Conduction: normal    ST segments:     ST segments:  Normal  T waves:     T waves: normal            ED Course  ED Course as of Aug 06 1551   Tue Aug 04, 2020   1459 Blood Pressure: 152/85   1459 Temperature: 98 8 °F (37 1 °C)   1459 Pulse: 58   1459 Respirations: 18   1459 SpO2: 97 %   1523 Hemoglobin(!): 18 0   1523 WBC: 8 57   1523 Blood Pressure: 152/85   1523 Pulse: 64   1523 Respirations: 13   1523 SpO2: 97 %   1555 Troponin I: <0 02   1604 Delta at 6:00pm      1856 Troponin I: <0 02   1924 Left prior to discharge instructions being provided          US AUDIT      Most Recent Value   Initial Alcohol Screen: US AUDIT-C    1  How often do you have a drink containing alcohol?  0 Filed at: 08/04/2020 1457   2  How many drinks containing alcohol do you have on a typical day you are drinking? 0 Filed at: 08/04/2020 1457   3a  Male UNDER 65: How often do you have five or more drinks on one occasion? 0 Filed at: 08/04/2020 1457   3b  FEMALE Any Age, or MALE 65+: How often do you have 4 or more drinks on one occassion? 0 Filed at: 08/04/2020 1457   Audit-C Score  0 Filed at: 08/04/2020 1457            HEART Risk Score      Most Recent Value   Heart Score Risk Calculator   History  0 Filed at: 08/04/2020 1555   ECG  0 Filed at: 08/04/2020 1555   Age  1 Filed at: 08/04/2020 1555   Risk Factors  1 Filed at: 08/04/2020 1555   Troponin  0 Filed at: 08/04/2020 1555   HEART Score  2 Filed at: 08/04/2020 1555            NORMA/DAST-10      Most Recent Value   How many times in the past year have you       Used an illegal drug or used a prescription medication for non-medical reasons? Never Filed at: 08/04/2020 1457                              [facility]  Number of Diagnoses or Management Options  Chest pain:   Palpitations:   PVC's (premature ventricular contractions):   Diagnosis management comments: 67-year-old male presents for evaluation of chest pain and shortness of breath  On exam patient is overall well appearing in no acute distress  He was noted to be slightly bradycardic  This may be secondary to his increased and his blood pressure medication  Will check cardiac workup  Labs, EKG, and chest x-ray were grossly unremarkable  Given patient's risk factors, will check delta troponin and EKG  Repeat troponin EKG remained unchanged  Patient will be discharged home  Patient left prior to receiving discharge instructions  He was advised prior to follow-up with his cardiologist and PCP  Disposition  Final diagnoses:   Chest pain   PVC's (premature ventricular contractions)   Palpitations     Time reflects when diagnosis was documented in both MDM as applicable and the Disposition within this note     Time User Action Codes Description Comment    8/4/2020  6:38 PM Check, Fito Other Add [R07 9] Chest pain     8/4/2020  6:38 PM Check, Fito Other Add [I49 3] PVC's (premature ventricular contractions)     8/4/2020  6:38 PM Check, Fito Other Add [R00 2] Palpitations       ED Disposition     ED Disposition Condition Date/Time Comment    Discharge Stable Tue Aug 4, 2020  6:38 PM Luis Nolen discharge to home/self care              Follow-up Information     Follow up With Specialties Details Why Contact Info Additional Information    Kirk Garcia MD Family Medicine Schedule an appointment as soon as possible for a visit in 1 week  [facility] [address] Emergency Department Emergency Medicine  If symptoms worsen [address] [place] [address]  [phone]  ED, [address], [address]   [phone]          Discharge Medication List as of 8/4/2020  7:21 PM      CONTINUE these medications which have NOT CHANGED    Details   ALPRAZolam (XANAX) 0 25 mg tablet Take 1 tablet (0 25 mg total) by mouth daily at bedtime as needed for anxiety, Starting Thu 4/4/2019, Normal      bisoprolol-hydrochlorothiazide (ZIAC) 5-6 25 MG per tablet Take 1 tablet by mouth daily, Starting Fri 2/7/2020, Until Wed 8/5/2020, Normal      FLUoxetine (PROzac) 10 MG tablet TAKE ONE TABLET BY MOUTH EVERY DAY, Normal      pantoprazole (PROTONIX) 40 mg tablet Take 1 tablet (40 mg total) by mouth daily, Starting Fri 2/7/2020, Normal           No discharge procedures on file  PDMP Review     None           ED Provider  Attending physically available and evaluated Paula Madrigal I managed the patient along with the ED Attending      Electronically Signed by         Halle Randhawa MD  08/06/20 5441

## 2020-08-05 LAB
ATRIAL RATE: 57 BPM
P AXIS: 60 DEGREES
PR INTERVAL: 184 MS
QRS AXIS: -12 DEGREES
QRSD INTERVAL: 86 MS
QT INTERVAL: 410 MS
QTC INTERVAL: 399 MS
T WAVE AXIS: 34 DEGREES
VENTRICULAR RATE: 57 BPM

## 2020-08-05 PROCEDURE — 93010 ELECTROCARDIOGRAM REPORT: CPT | Performed by: INTERNAL MEDICINE

## 2020-08-06 DIAGNOSIS — I10 BENIGN ESSENTIAL HYPERTENSION: ICD-10-CM

## 2020-08-06 RX ORDER — BISOPROLOL FUMARATE AND HYDROCHLOROTHIAZIDE 5; 6.25 MG/1; MG/1
TABLET ORAL
Qty: 90 TABLET | Refills: 1 | Status: SHIPPED | OUTPATIENT
Start: 2020-08-06 | End: 2020-08-18 | Stop reason: SDUPTHER

## 2020-08-14 ENCOUNTER — CONSULT (OUTPATIENT)
Dept: CARDIOLOGY CLINIC | Facility: CLINIC | Age: 55
End: 2020-08-14
Payer: COMMERCIAL

## 2020-08-14 VITALS
BODY MASS INDEX: 28.44 KG/M2 | DIASTOLIC BLOOD PRESSURE: 100 MMHG | SYSTOLIC BLOOD PRESSURE: 160 MMHG | WEIGHT: 210 LBS | HEART RATE: 64 BPM | HEIGHT: 72 IN | TEMPERATURE: 97.9 F

## 2020-08-14 DIAGNOSIS — R07.89 DISCOMFORT IN CHEST: Primary | ICD-10-CM

## 2020-08-14 DIAGNOSIS — I49.3 PVC (PREMATURE VENTRICULAR CONTRACTION): ICD-10-CM

## 2020-08-14 DIAGNOSIS — E78.5 DYSLIPIDEMIA: ICD-10-CM

## 2020-08-14 DIAGNOSIS — I10 BENIGN ESSENTIAL HYPERTENSION: ICD-10-CM

## 2020-08-14 PROCEDURE — 1036F TOBACCO NON-USER: CPT | Performed by: INTERNAL MEDICINE

## 2020-08-14 PROCEDURE — 3080F DIAST BP >= 90 MM HG: CPT | Performed by: INTERNAL MEDICINE

## 2020-08-14 PROCEDURE — 3008F BODY MASS INDEX DOCD: CPT | Performed by: INTERNAL MEDICINE

## 2020-08-14 PROCEDURE — 3077F SYST BP >= 140 MM HG: CPT | Performed by: INTERNAL MEDICINE

## 2020-08-14 PROCEDURE — 99204 OFFICE O/P NEW MOD 45 MIN: CPT | Performed by: INTERNAL MEDICINE

## 2020-08-14 RX ORDER — CHLORAL HYDRATE 500 MG
2000 CAPSULE ORAL DAILY
COMMUNITY
End: 2021-12-08

## 2020-08-14 RX ORDER — ASPIRIN 81 MG/1
81 TABLET ORAL DAILY
COMMUNITY

## 2020-08-14 RX ORDER — NIACIN 500 MG
500 TABLET ORAL
COMMUNITY
End: 2021-12-08

## 2020-08-14 RX ORDER — MELATONIN 10 MG
TABLET, SUBLINGUAL SUBLINGUAL
COMMUNITY

## 2020-08-14 NOTE — PROGRESS NOTES
Cardiology Follow Up    Ar Boothe  1965  [id number]  [address] [facility] [address] [phone]    1  Discomfort in chest  Echo stress test w contrast if indicated   2  Benign essential hypertension     3  Dyslipidemia     4  PVC (premature ventricular contraction)         Interval History:  A presents for evaluation of an episode of chest pain  It occurred approximately 10 days prior  He was seen in the emergency department  He describes the pain as upper sternal on somewhat reproducible that had been present for a number of days consistently  It did not have any association with exertion  It dissipated on its own and he has had no discomfort since  He tolerates activity a present without chest pain shortness of breath orthopnea paroxysmal nocturnal dyspnea or syncope  He has occasional palpitations  Patient Active Problem List   Diagnosis    Anxiety    Apnea    Benign essential hypertension    Depression    Direct inguinal hernia    Dyslipidemia    Esophagitis, reflux    Extrinsic asthma    ETD (Eustachian tube dysfunction), right    Premature ejaculation    Exudative pharyngitis     Past Medical History:   Diagnosis Date    GERD (gastroesophageal reflux disease)     Hypertension      Social History     Socioeconomic History    Marital status: /Civil Union     Spouse name: Not on file    Number of children: Not on file    Years of education: Not on file    Highest education level: Not on file   Occupational History    Not on file   Social Needs    Financial resource strain: Not on file    Food insecurity     Worry: Not on file     Inability: Not on file   [employer] needs     Medical: Not on file     Non-medical: Not on file   Tobacco Use    Smoking status: Never Smoker    Smokeless tobacco: Never Used   Substance and Sexual Activity    Alcohol use:  Yes Frequency: Monthly or less     Drinks per session: 1 or 2    Drug use: No    Sexual activity: Not on file   Lifestyle    Physical activity     Days per week: Not on file     Minutes per session: Not on file    Stress: Not on file   Relationships    Social connections     Talks on phone: Not on file     Gets together: Not on file     Attends [employer] service: Not on file     Active member of club or organization: Not on file     Attends meetings of clubs or organizations: Not on file     Relationship status: Not on file    Intimate partner violence     Fear of current or ex partner: Not on file     Emotionally abused: Not on file     Physically abused: Not on file     Forced sexual activity: Not on file   Other Topics Concern    Not on file   Social History Narrative    Not on file      Family History   Problem Relation Age of Onset    Depression Son      Past Surgical History:   Procedure Laterality Date    COLONOSCOPY      FACIAL/NECK BIOPSY N/A 8/14/2018    Procedure: EXCISION BIOPSY LESION POSTERIOR NECK;  Surgeon: Emiliano Ledezma MD;  Location: [facility] OR;  Service: General       Current Outpatient Medications:     ALPRAZolam (XANAX) 0 25 mg tablet, Take 1 tablet (0 25 mg total) by mouth daily at bedtime as needed for anxiety, Disp: 30 tablet, Rfl: 0    aspirin (ECOTRIN LOW STRENGTH) 81 mg EC tablet, Take 81 mg by mouth daily, Disp: , Rfl:     bisoprolol-hydrochlorothiazide (ZIAC) 5-6 25 MG per tablet, TAKE ONE TABLET BY MOUTH EVERY DAY, Disp: 90 tablet, Rfl: 1    Cholecalciferol (Vitamin D3) 250 MCG (44626 UT) TABS, Take by mouth, Disp: , Rfl:     FLUoxetine (PROzac) 10 MG tablet, TAKE ONE TABLET BY MOUTH EVERY DAY, Disp: 30 tablet, Rfl: 1    Lactobacillus (ACIDOPHILUS PO), Take by mouth, Disp: , Rfl:     niacin 500 mg tablet, Take 500 mg by mouth daily with breakfast, Disp: , Rfl:     Omega-3 Fatty Acids (fish oil) 1,000 mg, Take 2,000 mg by mouth daily, Disp: , Rfl:     pantoprazole (PROTONIX) 40 mg tablet, Take 1 tablet (40 mg total) by mouth daily, Disp: 90 tablet, Rfl: 1  Allergies   Allergen Reactions    Penicillins Diarrhea    Pravastatin Palpitations       Labs:  Admission on 08/04/2020, Discharged on 08/04/2020   Component Date Value    WBC 08/04/2020 8 57     RBC 08/04/2020 5 86*    Hemoglobin 08/04/2020 18 0*    Hematocrit 08/04/2020 51 4*    MCV 08/04/2020 88     MCH 08/04/2020 30 7     MCHC 08/04/2020 35 0     RDW 08/04/2020 12 4     MPV 08/04/2020 10 3     Platelets 54/00/6131 246     nRBC 08/04/2020 0     Neutrophils Relative 08/04/2020 56     Immat GRANS % 08/04/2020 0     Lymphocytes Relative 08/04/2020 32     Monocytes Relative 08/04/2020 9     Eosinophils Relative 08/04/2020 2     Basophils Relative 08/04/2020 1     Neutrophils Absolute 08/04/2020 4 82     Immature Grans Absolute 08/04/2020 0 02     Lymphocytes Absolute 08/04/2020 2 71     Monocytes Absolute 08/04/2020 0 75     Eosinophils Absolute 08/04/2020 0 20     Basophils Absolute 08/04/2020 0 07     Sodium 08/04/2020 136     Potassium 08/04/2020 4 3     Chloride 08/04/2020 102     CO2 08/04/2020 30     ANION GAP 08/04/2020 4     BUN 08/04/2020 12     Creatinine 08/04/2020 1 05     Glucose 08/04/2020 101     Calcium 08/04/2020 9 8     AST 08/04/2020 26     ALT 08/04/2020 67     Alkaline Phosphatase 08/04/2020 77     Total Protein 08/04/2020 8 3*    Albumin 08/04/2020 4 1     Total Bilirubin 08/04/2020 0 84     eGFR 08/04/2020 81     Troponin I 08/04/2020 <0 02     Troponin I 08/04/2020 <0 02     Ventricular Rate 08/04/2020 57     Atrial Rate 08/04/2020 57     AR Interval 08/04/2020 200     QRSD Interval 08/04/2020 78     QT Interval 08/04/2020 418     QTC Interval 08/04/2020 406     P Axis 08/04/2020 69     QRS Axis 08/04/2020 -9     T Wave Axis 08/04/2020 17     Ventricular Rate 08/04/2020 57     Atrial Rate 08/04/2020 57     AR Interval 08/04/2020 184     QRSD Interval 08/04/2020 86     QT Interval 08/04/2020 410     QTC Interval 08/04/2020 399     P Axis 08/04/2020 60     QRS [place] 08/04/2020 -12     T Wave [place] 08/04/2020 34      Imaging: Xr Chest 1 View Portable    Result Date: 8/4/2020  Narrative: CHEST INDICATION:   chest pain  COMPARISON:  August 9, 2018 EXAM PERFORMED/VIEWS:  XR CHEST PORTABLE FINDINGS: Cardiomediastinal silhouette appears unremarkable  The lungs are clear  No pneumothorax or pleural effusion  Osseous structures appear within normal limits for patient age  Impression: No acute cardiopulmonary disease  Workstation performed: [id number]       Review of Systems:  Review of Systems   Constitutional: Negative for fatigue  HENT: Negative for nosebleeds  Eyes: Negative for redness  Respiratory: Negative for chest tightness and shortness of breath  Cardiovascular: Positive for palpitations  Negative for chest pain and leg swelling  Gastrointestinal: Negative for abdominal pain  Endocrine: Negative for polyuria  Genitourinary: Negative for urgency  Musculoskeletal: Positive for arthralgias  Skin: Negative for rash  Neurological: Negative for dizziness and syncope  Psychiatric/Behavioral: Negative for confusion and sleep disturbance  The patient is nervous/anxious  Physical Exam:  Physical Exam  Constitutional:       Appearance: He is well-developed  HENT:      Head: Normocephalic and atraumatic  Nose: Nose normal    Eyes:      Pupils: Pupils are equal, round, and reactive to light  Neck:      Musculoskeletal: Neck supple  Cardiovascular:      Rate and Rhythm: Normal rate and regular rhythm  Heart sounds: Normal heart sounds  Pulmonary:      Effort: Pulmonary effort is normal       Breath sounds: Normal breath sounds  Abdominal:      General: Bowel sounds are normal       Palpations: Abdomen is soft  Musculoskeletal: Normal range of motion  Skin:     General: Skin is warm and dry  Neurological:      Mental Status: He is alert and oriented to person, place, and time  Psychiatric:         Behavior: Behavior normal          Thought Content: Thought content normal          Judgment: Judgment normal          Discussion/Summary:  Episode of atypical chest pain that was likely noncardiac  In the setting of his age, cholesterol profile, hypertension and family history I will check a stress echocardiogram to rule out obstructive epicardial coronary artery disease  Blood pressure is somewhat elevated in the office today I did tell him to increase his by bisoprolol hydrochlorothiazide to 2 tablets once in the morning and continue to follow-up with Dr Caroline Brown  On I did  him on diet and exercise regarding his elevated triglyceride level and I should note that he is on niacin at present  He also takes fish oil daily  We discussed potential coronary artery calcium score and perhaps the need for statin therapy  He at this time would like to refrain from that  We will repeat with the stress and echocardiogram and I will review that with him by phone  He was noted to have a few PVCs in the ED  None present on exam   Echocardiography he will give us an assessment of overall left ventricular systolic function

## 2020-08-18 DIAGNOSIS — I10 BENIGN ESSENTIAL HYPERTENSION: ICD-10-CM

## 2020-08-18 RX ORDER — BISOPROLOL FUMARATE AND HYDROCHLOROTHIAZIDE 5; 6.25 MG/1; MG/1
2 TABLET ORAL DAILY
Qty: 180 TABLET | Refills: 0
Start: 2020-08-18 | End: 2020-09-11 | Stop reason: ALTCHOICE

## 2020-08-26 ENCOUNTER — PATIENT OUTREACH (OUTPATIENT)
Dept: CASE MANAGEMENT | Facility: OTHER | Age: 55
End: 2020-08-26

## 2020-08-27 ENCOUNTER — PATIENT OUTREACH (OUTPATIENT)
Dept: CASE MANAGEMENT | Facility: OTHER | Age: 55
End: 2020-08-27

## 2020-08-28 ENCOUNTER — PATIENT OUTREACH (OUTPATIENT)
Dept: CASE MANAGEMENT | Facility: OTHER | Age: 55
End: 2020-08-28

## 2020-08-31 DIAGNOSIS — F41.9 ANXIETY: ICD-10-CM

## 2020-08-31 RX ORDER — FLUOXETINE 10 MG/1
TABLET, FILM COATED ORAL
Qty: 30 TABLET | Refills: 1 | Status: SHIPPED | OUTPATIENT
Start: 2020-08-31 | End: 2020-11-05

## 2020-09-08 ENCOUNTER — TELEMEDICINE (OUTPATIENT)
Dept: FAMILY MEDICINE CLINIC | Facility: CLINIC | Age: 55
End: 2020-09-08
Payer: COMMERCIAL

## 2020-09-08 VITALS
HEART RATE: 63 BPM | HEIGHT: 72 IN | SYSTOLIC BLOOD PRESSURE: 160 MMHG | WEIGHT: 210 LBS | DIASTOLIC BLOOD PRESSURE: 100 MMHG | BODY MASS INDEX: 28.44 KG/M2

## 2020-09-08 DIAGNOSIS — I10 UNCONTROLLED HYPERTENSION: ICD-10-CM

## 2020-09-08 DIAGNOSIS — I10 BENIGN ESSENTIAL HYPERTENSION: ICD-10-CM

## 2020-09-08 DIAGNOSIS — I10 ESSENTIAL HYPERTENSION: Primary | ICD-10-CM

## 2020-09-08 PROCEDURE — 99214 OFFICE O/P EST MOD 30 MIN: CPT | Performed by: FAMILY MEDICINE

## 2020-09-08 RX ORDER — AMLODIPINE BESYLATE 10 MG/1
10 TABLET ORAL DAILY
Qty: 30 TABLET | Refills: 0 | Status: SHIPPED | OUTPATIENT
Start: 2020-09-08 | End: 2020-10-05

## 2020-09-08 NOTE — ASSESSMENT & PLAN NOTE
The patient has currently uncontrolled hypertension  We are going to have him take 2 of his Ziac 5 in the morning  We are going to add amlodipine 10 at bedtime  Discussed potential side effects such as headache or edema  He has a follow-up appointment with us scheduled on Friday and will re-evaluate his blood pressure at that time  Wife's going to continue to monitor his blood pressure and call in the meantime as needed  Certainly should he develop any progressive cardiovascular, pulmonary or neurologic symptomatology he will call or seek more urgent medical attention as needed  We also feel that a secondary workup for hypertension is appropriate at this time  We are going to have him go for renal artery ultrasound, will add thyroid function testing to his blood work on Friday and consider 24 hour urine for metanephrines and VMA  He and wife in agreement with this plan

## 2020-09-08 NOTE — PROGRESS NOTES
Virtual Regular Visit      Assessment/Plan:    Problem List Items Addressed This Visit        Cardiovascular and Mediastinum    Benign essential hypertension     The patient has currently uncontrolled hypertension  We are going to have him take 2 of his Ziac 5 in the morning  We are going to add amlodipine 10 at bedtime  Discussed potential side effects such as headache or edema  He has a follow-up appointment with us scheduled on Friday and will re-evaluate his blood pressure at that time  Wife's going to continue to monitor his blood pressure and call in the meantime as needed  Certainly should he develop any progressive cardiovascular, pulmonary or neurologic symptomatology he will call or seek more urgent medical attention as needed  We also feel that a secondary workup for hypertension is appropriate at this time  We are going to have him go for renal artery ultrasound, will add thyroid function testing to his blood work on Friday and consider 24 hour urine for metanephrines and VMA  He and wife in agreement with this plan  Relevant Medications    amLODIPine (NORVASC) 10 mg tablet      Other Visit Diagnoses     Essential hypertension    -  Primary    Relevant Medications    amLODIPine (NORVASC) 10 mg tablet               Reason for visit is   Chief Complaint   Patient presents with    Blood Pressure Check     discuss high bp's    Virtual Regular Visit        Encounter provider Connie Colon MD    Provider located at [address]  [address] [place] [id number]      Recent Visits  No visits were found meeting these conditions  Showing recent visits within past 7 days and meeting all other requirements     Today's Visits  Date Type Provider Dept   09/08/20 Telemedicine Connie Colon MD [facility]   Showing today's visits and meeting all other requirements     Future Appointments  No visits were found meeting these conditions     Showing future appointments within next 150 days and meeting all other requirements        The patient was identified by name and date of birth  Adiel Ward was informed that this is a telemedicine visit and that the visit is being conducted through [facility] and patient was informed that this is a secure, HIPAA-compliant platform  He agrees to proceed     My office door was closed  No one else was in the room  He acknowledged consent and understanding of privacy and security of the video platform  The patient has agreed to participate and understands they can discontinue the visit at any time  Patient is aware this is a billable service  Subjective  Adiel Ward is a 49 y o  male who presents virtually over concerns over BP  Bps are 160s-170s over 100   Getting Has  Ziac 2 am and 1 later am and 1 HS  Lisinopril by history  No history amlodipine  HPI   Patient is a 28-year-old male with history of essential hypertension  Recently he was seen in the emergency room with chest pain and accelerated blood pressure  Workup for acute coronary syndrome was negative  Subsequently he was seen in the office by Dr Kelechi Sands  Assessment at times benign essential hypertension as well as dyslipidemia and PVCs  He recommended echo stress test with contrast which was scheduled for 9/23  Since that time the patient has continued to have some headaches and some intermittent atypical chest pain  His wife is a nurse and has been taking his blood pressure  It has been running in the 160s 170s over 100  He has been taking his Ziac 5 up to 4 tablets daily  He states that he takes 2 1st thing in the morning if his blood pressure does not come down he takes a 3rd  If his blood pressures up in the evening he takes 1 at bedtime  He has had some headaches  He has had no diplopia or focal neurologic symptom  He has had no PND orthopnea edema X cetera  He has never had a secondary workup for hypertension    Only other previous medication was lisinopril  Past Medical History:   Diagnosis Date    GERD (gastroesophageal reflux disease)     Hypertension        Past Surgical History:   Procedure Laterality Date    COLONOSCOPY      FACIAL/NECK BIOPSY N/A 8/14/2018    Procedure: EXCISION BIOPSY LESION POSTERIOR NECK;  Surgeon: Allan Carrero MD;  Location:  MAIN OR;  Service: General       Current Outpatient Medications   Medication Sig Dispense Refill    ALPRAZolam (XANAX) 0 25 mg tablet Take 1 tablet (0 25 mg total) by mouth daily at bedtime as needed for anxiety 30 tablet 0    aspirin (ECOTRIN LOW STRENGTH) 81 mg EC tablet Take 81 mg by mouth daily      bisoprolol-hydrochlorothiazide (ZIAC) 5-6 25 MG per tablet Take 2 tablets by mouth daily 180 tablet 0    Cholecalciferol (Vitamin D3) 250 MCG (85438 UT) TABS Take by mouth      FLUoxetine (PROzac) 10 MG tablet TAKE ONE TABLET BY MOUTH EVERY DAY 30 tablet 1    Lactobacillus (ACIDOPHILUS PO) Take by mouth      niacin 500 mg tablet Take 500 mg by mouth daily with breakfast      Omega-3 Fatty Acids (fish oil) 1,000 mg Take 2,000 mg by mouth daily      pantoprazole (PROTONIX) 40 mg tablet Take 1 tablet (40 mg total) by mouth daily 90 tablet 1    amLODIPine (NORVASC) 10 mg tablet Take 1 tablet (10 mg total) by mouth daily 30 tablet 0     No current facility-administered medications for this visit  Allergies   Allergen Reactions    Penicillins Diarrhea    Pravastatin Palpitations       Review of Systems   Constitutional: Negative  Respiratory: Negative  Cardiovascular: Positive for chest pain and palpitations  Negative for leg swelling  Endocrine: Negative  Genitourinary: Negative  Neurological: Positive for headaches  Negative for dizziness, tremors, seizures, speech difficulty, weakness and light-headedness  Hematological: Negative for adenopathy  Does not bruise/bleed easily  Psychiatric/Behavioral: Negative for dysphoric mood   The patient is nervous/anxious  Video Exam    Vitals:    09/08/20 1521   BP: 160/100   Pulse: 63   Weight: 95 3 kg (210 lb)   Height: 6' (1 829 m)       Physical Exam  Constitutional:       Comments: Somewhat overweight and in no distress   Neck:      Comments: No JVD  Pulmonary:      Effort: Pulmonary effort is normal  No respiratory distress  Breath sounds: Normal breath sounds  Musculoskeletal:      Right lower leg: No edema  Left lower leg: No edema  Neurological:      General: No focal deficit present  Mental Status: He is alert and oriented to person, place, and time  Psychiatric:         Mood and Affect: Mood normal          Thought Content: Thought content normal          Judgment: Judgment normal           I spent 15 minutes directly with the patient during this visit      VIRTUAL VISIT DISCLAIMER    Sylvie Aden acknowledges that he has consented to an online visit or consultation  He understands that the online visit is based solely on information provided by him, and that, in the absence of a face-to-face physical evaluation by the physician, the diagnosis he receives is both limited and provisional in terms of accuracy and completeness  This is not intended to replace a full medical face-to-face evaluation by the physician  Sylvie Aden understands and accepts these terms

## 2020-09-11 ENCOUNTER — OFFICE VISIT (OUTPATIENT)
Dept: FAMILY MEDICINE CLINIC | Facility: CLINIC | Age: 55
End: 2020-09-11
Payer: COMMERCIAL

## 2020-09-11 VITALS
BODY MASS INDEX: 28.71 KG/M2 | WEIGHT: 212 LBS | HEART RATE: 72 BPM | HEIGHT: 72 IN | SYSTOLIC BLOOD PRESSURE: 128 MMHG | TEMPERATURE: 97.1 F | DIASTOLIC BLOOD PRESSURE: 90 MMHG

## 2020-09-11 DIAGNOSIS — I10 BENIGN ESSENTIAL HYPERTENSION: ICD-10-CM

## 2020-09-11 DIAGNOSIS — K21.00 ESOPHAGITIS, REFLUX: ICD-10-CM

## 2020-09-11 DIAGNOSIS — F41.9 ANXIETY: ICD-10-CM

## 2020-09-11 DIAGNOSIS — E78.5 DYSLIPIDEMIA: ICD-10-CM

## 2020-09-11 DIAGNOSIS — I10 ACCELERATED HYPERTENSION: Primary | ICD-10-CM

## 2020-09-11 DIAGNOSIS — I10 ESSENTIAL HYPERTENSION: ICD-10-CM

## 2020-09-11 DIAGNOSIS — F32.A DEPRESSION, UNSPECIFIED DEPRESSION TYPE: ICD-10-CM

## 2020-09-11 PROBLEM — J02.9 EXUDATIVE PHARYNGITIS: Status: RESOLVED | Noted: 2020-05-20 | Resolved: 2020-09-11

## 2020-09-11 PROCEDURE — 99214 OFFICE O/P EST MOD 30 MIN: CPT | Performed by: FAMILY MEDICINE

## 2020-09-11 RX ORDER — BISOPROLOL FUMARATE AND HYDROCHLOROTHIAZIDE 10; 6.25 MG/1; MG/1
1 TABLET ORAL 2 TIMES DAILY
Qty: 180 TABLET | Refills: 1 | Status: SHIPPED | OUTPATIENT
Start: 2020-09-11 | End: 2021-03-03

## 2020-09-11 RX ORDER — ALPRAZOLAM 0.25 MG/1
0.25 TABLET ORAL
Qty: 30 TABLET | Refills: 0 | Status: SHIPPED | OUTPATIENT
Start: 2020-09-11 | End: 2021-10-08 | Stop reason: SDUPTHER

## 2020-09-11 NOTE — PROGRESS NOTES
Assessment/Plan:  Esophagitis, reflux  Continue with pantoprazole  No worrisome symptoms  Efforts at weaning unsuccessful  Benign essential hypertension  Blood pressure control presently fluctuating  We are going to increase his dose of Ziac to 10/6 25 b i d  And have him take amlodipine 10 in the morning  He is asked call over the next few days with his blood pressure readings  Additionally we are going to have him go for renal artery ultrasound in 24 hour urine for VMA metabolites to begin evaluation for secondary causes  Anxiety  Continue fluoxetine as well as alprazolam    Depression  Continue fluoxetine  Dyslipidemia  Had a relatively extensive discussion of diet exercise for improvement in his dyslipidemia today  Diagnoses and all orders for this visit:    Accelerated hypertension  -     Metanephrines Fractionated, urine, 24 hour; Future    Anxiety  -     ALPRAZolam (XANAX) 0 25 mg tablet; Take 1 tablet (0 25 mg total) by mouth daily at bedtime as needed for anxiety    Benign essential hypertension  -     bisoprolol-hydrochlorothiazide (ZIAC) 10-6 25 MG per tablet; Take 1 tablet by mouth 2 (two) times a day    Essential hypertension    Esophagitis, reflux    Depression, unspecified depression type    Dyslipidemia          Subjective:   Chief Complaint   Patient presents with    med check     pt here for 6m med check and bp issues     Taking 2 Ziac in am  Bad Has and tachycardia  Ziac    Patient ID: Adiel Ward is a 49 y o  male  HPI  The patient is a 55-year-old male who presents today for follow-up of multiple medical problems  His wife is a nurse and has been taking his blood pressures recently  They have been uncontrolled in the 170/100 range  We had a virtual visit several days ago  We added amlodipine to his regimen of Ziac  He continues to have fluctuating blood pressures  He also complains of tachycardia and headaches for the past couple weeks    He has no chest pain or shortness of breath  He states that his reflux is under control for the most part and has no dysphagia weight loss anorexia X cetera  He continues with fluoxetine in his mood is fairly good as well as anxiety level  He does take alprazolam to sleep at times  The following portions of the patient's history were reviewed and updated as appropriate: allergies, current medications, past family history, past medical history, past social history, past surgical history and problem list     Review of Systems   Constitution: Negative  Cardiovascular: Positive for palpitations  Negative for chest pain, claudication, dyspnea on exertion, irregular heartbeat, leg swelling, orthopnea, paroxysmal nocturnal dyspnea and syncope  Respiratory: Negative  Endocrine: Negative for polydipsia, polyphagia and polyuria  Hematologic/Lymphatic: Negative for adenopathy and bleeding problem  Does not bruise/bleed easily  Skin: Negative for rash  Gastrointestinal: Positive for heartburn  Negative for bowel incontinence, constipation, diarrhea, dysphagia, hematemesis, hematochezia, nausea and vomiting  Genitourinary: Negative for bladder incontinence  Neurological: Positive for headaches  Negative for focal weakness and paresthesias  Psychiatric/Behavioral: Positive for depression  Negative for substance abuse and suicidal ideas  The patient has insomnia and is nervous/anxious  Objective:    Physical Exam   Constitutional: He is oriented to person, place, and time  He appears well-developed  Overweight in no distress   Neck: No JVD present  Cardiovascular: Normal rate, regular rhythm and normal heart sounds  Normal carotid upstroke without bruit   Pulmonary/Chest: Effort normal and breath sounds normal    Musculoskeletal:         General: No edema  Lymphadenopathy:     He has no cervical adenopathy  Neurological: He is alert and oriented to person, place, and time  Skin: No rash noted  No erythema  Psychiatric: He has a normal mood and affect  Judgment and thought content normal    Nursing note and vitals reviewed

## 2020-09-11 NOTE — ASSESSMENT & PLAN NOTE
Blood pressure control presently fluctuating  We are going to increase his dose of Ziac to 10/6 25 b i d  And have him take amlodipine 10 in the morning  He is asked call over the next few days with his blood pressure readings  Additionally we are going to have him go for renal artery ultrasound in 24 hour urine for VMA metabolites to begin evaluation for secondary causes

## 2020-09-16 DIAGNOSIS — I10 BENIGN ESSENTIAL HYPERTENSION: Primary | ICD-10-CM

## 2020-09-16 RX ORDER — LOSARTAN POTASSIUM 50 MG/1
50 TABLET ORAL DAILY
Qty: 30 TABLET | Refills: 0 | Status: SHIPPED | OUTPATIENT
Start: 2020-09-16 | End: 2020-10-15

## 2020-09-16 NOTE — PROGRESS NOTES
I received a message from patient  He has been compliant with amlodipine 10 mg in addition to his Ziac  Blood pressures remain in the 130-40 range over 110  We are going to have him add losartan 50 mg daily  We are going to ask staff to remind him that he needs to go for his testing for secondary causes  Also call back with blood pressure readings over the next several days

## 2020-09-23 ENCOUNTER — HOSPITAL ENCOUNTER (OUTPATIENT)
Dept: NON INVASIVE DIAGNOSTICS | Age: 55
Discharge: HOME | End: 2020-09-23
Payer: COMMERCIAL

## 2020-09-23 ENCOUNTER — TELEPHONE (OUTPATIENT)
Dept: CARDIOLOGY CLINIC | Facility: CLINIC | Age: 55
End: 2020-09-23

## 2020-09-23 DIAGNOSIS — R07.89 DISCOMFORT IN CHEST: ICD-10-CM

## 2020-09-23 LAB
CHEST PAIN STATEMENT: NORMAL
MAX DIASTOLIC BP: 78 MMHG
MAX HEART RATE: 153 BPM
MAX PREDICTED HEART RATE: 167 BPM
MAX. SYSTOLIC BP: 148 MMHG
PROTOCOL NAME: NORMAL
REASON FOR TERMINATION: NORMAL
TARGET HR FORMULA: NORMAL
TEST INDICATION: NORMAL
TIME IN EXERCISE PHASE: NORMAL

## 2020-09-23 PROCEDURE — 93351 STRESS TTE COMPLETE: CPT | Performed by: INTERNAL MEDICINE

## 2020-09-23 PROCEDURE — 93350 STRESS TTE ONLY: CPT

## 2020-09-23 NOTE — NURSING NOTE
Phone call to Dr Terri Green [employer] office  Pt for stress echo today, resting EKG afib  Dr Nancy Castaneda reviewed EKG, pt to follow up with Dr Clemencia Rivas re same  Stress echo performed, results pending

## 2020-09-23 NOTE — TELEPHONE ENCOUNTER
Message left on vm to call [place] office of Dr Vitaliy Olson has available ov tomorrow, 9/24  Dr Kemar Olson notified via [facility] Text re arrival at [facility] cardiology for echo stress and in A fib

## 2020-09-24 ENCOUNTER — OFFICE VISIT (OUTPATIENT)
Dept: CARDIOLOGY CLINIC | Facility: CLINIC | Age: 55
End: 2020-09-24
Payer: COMMERCIAL

## 2020-09-24 VITALS
SYSTOLIC BLOOD PRESSURE: 114 MMHG | HEART RATE: 57 BPM | WEIGHT: 217 LBS | BODY MASS INDEX: 29.39 KG/M2 | DIASTOLIC BLOOD PRESSURE: 76 MMHG | TEMPERATURE: 97.3 F | HEIGHT: 72 IN

## 2020-09-24 DIAGNOSIS — I10 BENIGN ESSENTIAL HYPERTENSION: ICD-10-CM

## 2020-09-24 DIAGNOSIS — R07.1 CHEST PAIN ON BREATHING: Primary | ICD-10-CM

## 2020-09-24 DIAGNOSIS — E78.5 DYSLIPIDEMIA: ICD-10-CM

## 2020-09-24 DIAGNOSIS — I48.0 PAROXYSMAL ATRIAL FIBRILLATION (HCC): ICD-10-CM

## 2020-09-24 PROCEDURE — 3074F SYST BP LT 130 MM HG: CPT | Performed by: INTERNAL MEDICINE

## 2020-09-24 PROCEDURE — 3078F DIAST BP <80 MM HG: CPT | Performed by: INTERNAL MEDICINE

## 2020-09-24 PROCEDURE — 99214 OFFICE O/P EST MOD 30 MIN: CPT | Performed by: INTERNAL MEDICINE

## 2020-09-24 PROCEDURE — 1036F TOBACCO NON-USER: CPT | Performed by: INTERNAL MEDICINE

## 2020-09-25 PROCEDURE — 93000 ELECTROCARDIOGRAM COMPLETE: CPT | Performed by: INTERNAL MEDICINE

## 2020-09-25 NOTE — PROGRESS NOTES
Cardiology Follow Up    Kiersten Rollins  1965  [address]  [address]  [phone]    1  Chest pain on breathing  POCT ECG    TSH, 3rd generation    NM myocardial perfusion spect (stress and/or rest)    Holter monitor - 48 hour    Echo complete with contrast if indicated   2  Paroxysmal atrial fibrillation (HCC)     3  Benign essential hypertension     4  Dyslipidemia         Interval History:    I originally saw Jesse Deluna on 08/14/2020 approximately 1 week after he had presented to the emergency department with an episode of chest pain  The chest pain at that time had been going on a couple of days, it was upper sternal and somewhat reproducible  On a stress echocardiogram was ordered  Upon arrival for his stress echocardiogram yesterday he was found to be in atrial fibrillation  He denied any palpitations at that time  He feels relatively fatigue    He denies orthopnea paroxysmal nocturnal dyspnea or syn    Patient Active Problem List   Diagnosis    Anxiety    Apnea    Benign essential hypertension    Depression    Direct inguinal hernia    Dyslipidemia    Esophagitis, reflux    Extrinsic asthma    ETD (Eustachian tube dysfunction), right    Premature ejaculation     Past Medical History:   Diagnosis Date    Exudative pharyngitis 5/20/2020    GERD (gastroesophageal reflux disease)     Hypertension      Social History     Socioeconomic History    Marital status: /Civil Union     Spouse name: Not on file    Number of children: Not on file    Years of education: Not on file    Highest education level: Not on file   Occupational History    Not on file   Social Needs    Financial resource strain: Not on file    Food insecurity     Worry: Not on file     Inability: Not on file    Transportation needs     Medical: Not on file     Non-medical: Not on file Tobacco Use    Smoking status: Never Smoker    Smokeless tobacco: Never Used   Substance and Sexual Activity    Alcohol use: Yes     Frequency: Monthly or less     Drinks per session: 1 or 2    Drug use: No    Sexual activity: Not on file   Lifestyle    Physical activity     Days per week: Not on file     Minutes per session: Not on file    Stress: Not on file   Relationships    Social connections     Talks on phone: Not on file     Gets together: Not on file     Attends Sikh service: Not on file     Active member of club or organization: Not on file     Attends meetings of clubs or organizations: Not on file     Relationship status: Not on file    Intimate partner violence     Fear of current or ex partner: Not on file     Emotionally abused: Not on file     Physically abused: Not on file     Forced sexual activity: Not on file   Other Topics Concern    Not on file   Social History Narrative    Not on file      Family History   Problem Relation Age of Onset    Depression Son      Past Surgical History:   Procedure Laterality Date    COLONOSCOPY      FACIAL/NECK BIOPSY N/A 8/14/2018    Procedure: EXCISION BIOPSY LESION POSTERIOR NECK;  Surgeon: Branden Lazaro MD;  Location: [facility] OR;  Service: General       Current Outpatient Medications:     ALPRAZolam (XANAX) 0 25 mg tablet, Take 1 tablet (0 25 mg total) by mouth daily at bedtime as needed for anxiety, Disp: 30 tablet, Rfl: 0    amLODIPine (NORVASC) 10 mg tablet, Take 1 tablet (10 mg total) by mouth daily, Disp: 30 tablet, Rfl: 0    aspirin (ECOTRIN LOW STRENGTH) 81 mg EC tablet, Take 81 mg by mouth daily, Disp: , Rfl:     bisoprolol-hydrochlorothiazide (ZIAC) 10-6 25 MG per tablet, Take 1 tablet by mouth 2 (two) times a day, Disp: 180 tablet, Rfl: 1    Cholecalciferol (Vitamin D3) 250 MCG (44246 UT) TABS, Take by mouth, Disp: , Rfl:     FLUoxetine (PROzac) 10 MG tablet, TAKE ONE TABLET BY MOUTH EVERY DAY, Disp: 30 tablet, Rfl: 1   Lactobacillus (ACIDOPHILUS PO), Take by mouth, Disp: , Rfl:     losartan (COZAAR) 50 mg tablet, Take 1 tablet (50 mg total) by mouth daily, Disp: 30 tablet, Rfl: 0    niacin 500 mg tablet, Take 500 mg by mouth daily with breakfast, Disp: , Rfl:     Omega-3 Fatty Acids (fish oil) 1,000 mg, Take 2,000 mg by mouth daily, Disp: , Rfl:     pantoprazole (PROTONIX) 40 mg tablet, Take 1 tablet (40 mg total) by mouth daily, Disp: 90 tablet, Rfl: 1  Allergies   Allergen Reactions    Bactrim [Sulfamethoxazole-Trimethoprim] Diarrhea     vomiting    Penicillins Diarrhea    Pravastatin Palpitations       Labs:  Hospital Outpatient Visit on 09/23/2020   Component Date Value    Protocol Name 09/23/2020 LOU     Time In Exercise Phase 09/23/2020 00:07:24     MAX   SYSTOLIC BP 60/91/5920 625     Max Diastolic Bp 78/14/3320 78     Max Heart Rate 09/23/2020 153     Max Predicted Heart Rate 09/23/2020 167     Reason for Termination 09/23/2020 Fatigue     Test Indication 09/23/2020 Chest Discomfort, short of breath     Target Hr Formular 09/23/2020 (220 - Age)*100%     Chest Pain Statement 09/23/2020 none    Admission on 08/04/2020, Discharged on 08/04/2020   Component Date Value    WBC 08/04/2020 8 57     RBC 08/04/2020 5 86*    Hemoglobin 08/04/2020 18 0*    Hematocrit 08/04/2020 51 4*    MCV 08/04/2020 88     MCH 08/04/2020 30 7     MCHC 08/04/2020 35 0     RDW 08/04/2020 12 4     MPV 08/04/2020 10 3     Platelets 76/03/0779 246     nRBC 08/04/2020 0     Neutrophils Relative 08/04/2020 56     Immat GRANS % 08/04/2020 0     Lymphocytes Relative 08/04/2020 32     Monocytes Relative 08/04/2020 9     Eosinophils Relative 08/04/2020 2     Basophils Relative 08/04/2020 1     Neutrophils Absolute 08/04/2020 4 82     Immature Grans Absolute 08/04/2020 0 02     Lymphocytes Absolute 08/04/2020 2 71     Monocytes Absolute 08/04/2020 0 75     Eosinophils Absolute 08/04/2020 0 20     Basophils Absolute 08/04/2020 0 07     Sodium 08/04/2020 136     Potassium 08/04/2020 4 3     Chloride 08/04/2020 102     CO2 08/04/2020 30     ANION GAP 08/04/2020 4     BUN 08/04/2020 12     Creatinine 08/04/2020 1 05     Glucose 08/04/2020 101     Calcium 08/04/2020 9 8     AST 08/04/2020 26     ALT 08/04/2020 67     Alkaline Phosphatase 08/04/2020 77     Total Protein 08/04/2020 8 3*    Albumin 08/04/2020 4 1     Total Bilirubin 08/04/2020 0 84     eGFR 08/04/2020 81     Troponin I 08/04/2020 <0 02     Troponin I 08/04/2020 <0 02     Ventricular Rate 08/04/2020 57     Atrial Rate 08/04/2020 57     VT Interval 08/04/2020 200     QRSD Interval 08/04/2020 78     QT Interval 08/04/2020 418     QTC Interval 08/04/2020 406     P Axis 08/04/2020 69     QRS Axis 08/04/2020 -9     T Wave Axis 08/04/2020 17     Ventricular Rate 08/04/2020 57     Atrial Rate 08/04/2020 57     VT Interval 08/04/2020 184     QRSD Interval 08/04/2020 86     QT Interval 08/04/2020 410     QTC Interval 08/04/2020 399     P Axis 08/04/2020 60     QRS [place] 08/04/2020 -12     T Wave [place] 08/04/2020 34      Imaging: No results found  Review of Systems:  Review of Systems   Constitutional: Negative for fatigue  HENT: Negative for nosebleeds  Eyes: Negative for redness  Respiratory: Negative for chest tightness and shortness of breath  Cardiovascular: Positive for palpitations  Negative for chest pain and leg swelling  Gastrointestinal: Negative for abdominal pain  Endocrine: Negative for polyuria  Genitourinary: Negative for urgency  Musculoskeletal: Positive for arthralgias  Skin: Negative for rash  Neurological: Negative for dizziness and syncope  Psychiatric/Behavioral: Negative for confusion and sleep disturbance  The patient is nervous/anxious  Physical Exam:  Physical Exam  Constitutional:       Appearance: He is well-developed  HENT:      Head: Normocephalic and atraumatic        Nose: Nose normal    Eyes:      Pupils: Pupils are equal, round, and reactive to light  Neck:      Musculoskeletal: Neck supple  Cardiovascular:      Rate and Rhythm: Normal rate and regular rhythm  Heart sounds: Normal heart sounds  Pulmonary:      Effort: Pulmonary effort is normal       Breath sounds: Normal breath sounds  Abdominal:      General: Bowel sounds are normal       Palpations: Abdomen is soft  Musculoskeletal: Normal range of motion  Skin:     General: Skin is warm and dry  Neurological:      Mental Status: He is alert and oriented to person, place, and time  Psychiatric:         Behavior: Behavior normal          Thought Content: Thought content normal          Judgment: Judgment normal          Discussion/Summary:   Maria Luz Mcmillan had atypical chest pain that I felt was unlikely cardiac  He presented for a stress echocardiogram yesterday and was found to be in atrial fibrillation  He did the stress test and exercised to a maximum heart rate of 153 without EKG changes  Study was inadequate for the diagnostic evaluation of real regional wall motion abnormality however there was appropriate augmentation in left ventricular systolic function       A today on arrival to the office he is in sinus rhythm with a rate in the 50s  His blood pressure is well controlled  As I reviewed his lab work I found his hemoglobin to be 18 in his hematocrit to be 51 4  Concerned that he may have polycythemia and I have asked him to follow up with his primary doctor regarding this  In the meantime with a nondiagnostic stress study I have ordered a nuclear stress test   Will also check an echocardiogram   Will begin evaluation with a 48 hour Holter monitor  I gave him a slip to have a TSH checked  He is currently on bisoprolol hydrochlorothiazide  2 tablets daily amlodipine and losartan   And blood pressure is controlled  He is on niacin for hypertriglyceridemia    As it is unclear what his burden May of atrial fibrillation on is in his Alejandra Vasc score is 1 for now we will anticoagulate him with Xarelto 20 mg daily  I discussed the potential risks and benefits of this therapy with his wife and we will continue it is we go to undergo further workup  I will see him back following the testing and make further recommendations

## 2020-09-27 DIAGNOSIS — K21.00 ESOPHAGITIS, REFLUX: ICD-10-CM

## 2020-09-28 DIAGNOSIS — I10 UNCONTROLLED HYPERTENSION: ICD-10-CM

## 2020-09-28 DIAGNOSIS — R53.83 FATIGUE, UNSPECIFIED TYPE: Primary | ICD-10-CM

## 2020-09-28 RX ORDER — PANTOPRAZOLE SODIUM 40 MG/1
TABLET, DELAYED RELEASE ORAL
Qty: 90 TABLET | Refills: 1 | Status: SHIPPED | OUTPATIENT
Start: 2020-09-28 | End: 2021-04-02

## 2020-09-30 ENCOUNTER — TELEPHONE (OUTPATIENT)
Dept: SLEEP CENTER | Facility: CLINIC | Age: 55
End: 2020-09-30

## 2020-10-03 DIAGNOSIS — I10 ESSENTIAL HYPERTENSION: ICD-10-CM

## 2020-10-05 RX ORDER — AMLODIPINE BESYLATE 10 MG/1
TABLET ORAL
Qty: 30 TABLET | Refills: 0 | Status: SHIPPED | OUTPATIENT
Start: 2020-10-05 | End: 2020-10-28

## 2020-10-14 DIAGNOSIS — I10 BENIGN ESSENTIAL HYPERTENSION: ICD-10-CM

## 2020-10-14 DIAGNOSIS — I48.91 ATRIAL FIBRILLATION, UNSPECIFIED TYPE (HCC): Primary | ICD-10-CM

## 2020-10-15 RX ORDER — LOSARTAN POTASSIUM 50 MG/1
TABLET ORAL
Qty: 30 TABLET | Refills: 0 | Status: SHIPPED | OUTPATIENT
Start: 2020-10-15 | End: 2020-11-03

## 2020-10-22 ENCOUNTER — HOSPITAL ENCOUNTER (OUTPATIENT)
Dept: NON INVASIVE DIAGNOSTICS | Facility: CLINIC | Age: 55
Discharge: HOME | End: 2020-10-22
Payer: COMMERCIAL

## 2020-10-22 DIAGNOSIS — R07.1 CHEST PAIN ON BREATHING: ICD-10-CM

## 2020-10-22 LAB
ARRHY DURING EX: NORMAL
CHEST PAIN STATEMENT: NORMAL
MAX DIASTOLIC BP: 76 MMHG
MAX HEART RATE: 121 BPM
MAX PREDICTED HEART RATE: 167 BPM
MAX. SYSTOLIC BP: 160 MMHG
PROTOCOL NAME: NORMAL
REASON FOR TERMINATION: NORMAL
TARGET HR FORMULA: NORMAL
TEST INDICATION: NORMAL
TIME IN EXERCISE PHASE: NORMAL

## 2020-10-22 PROCEDURE — A9502 TC99M TETROFOSMIN: HCPCS

## 2020-10-22 PROCEDURE — G1004 CDSM NDSC: HCPCS

## 2020-10-22 PROCEDURE — 93225 XTRNL ECG REC<48 HRS REC: CPT

## 2020-10-22 PROCEDURE — 93018 CV STRESS TEST I&R ONLY: CPT | Performed by: INTERNAL MEDICINE

## 2020-10-22 PROCEDURE — 78452 HT MUSCLE IMAGE SPECT MULT: CPT | Performed by: INTERNAL MEDICINE

## 2020-10-22 PROCEDURE — 93016 CV STRESS TEST SUPVJ ONLY: CPT | Performed by: INTERNAL MEDICINE

## 2020-10-22 PROCEDURE — 78452 HT MUSCLE IMAGE SPECT MULT: CPT

## 2020-10-22 PROCEDURE — 93226 XTRNL ECG REC<48 HR SCAN A/R: CPT

## 2020-10-22 PROCEDURE — 93017 CV STRESS TEST TRACING ONLY: CPT

## 2020-10-22 RX ADMIN — REGADENOSON 0.4 MG: 0.08 INJECTION, SOLUTION INTRAVENOUS at 13:30

## 2020-10-27 DIAGNOSIS — I10 ESSENTIAL HYPERTENSION: ICD-10-CM

## 2020-10-28 RX ORDER — AMLODIPINE BESYLATE 10 MG/1
TABLET ORAL
Qty: 30 TABLET | Refills: 0 | Status: SHIPPED | OUTPATIENT
Start: 2020-10-28 | End: 2021-03-12

## 2020-10-30 PROCEDURE — 93227 XTRNL ECG REC<48 HR R&I: CPT | Performed by: INTERNAL MEDICINE

## 2020-11-02 DIAGNOSIS — I10 BENIGN ESSENTIAL HYPERTENSION: ICD-10-CM

## 2020-11-03 RX ORDER — LOSARTAN POTASSIUM 50 MG/1
TABLET ORAL
Qty: 30 TABLET | Refills: 0 | Status: SHIPPED | OUTPATIENT
Start: 2020-11-03 | End: 2020-11-05

## 2020-11-05 DIAGNOSIS — I10 BENIGN ESSENTIAL HYPERTENSION: ICD-10-CM

## 2020-11-05 DIAGNOSIS — F41.9 ANXIETY: ICD-10-CM

## 2020-11-05 RX ORDER — FLUOXETINE 10 MG/1
TABLET, FILM COATED ORAL
Qty: 30 TABLET | Refills: 1 | Status: SHIPPED | OUTPATIENT
Start: 2020-11-05 | End: 2021-01-04

## 2020-11-05 RX ORDER — LOSARTAN POTASSIUM 50 MG/1
TABLET ORAL
Qty: 30 TABLET | Refills: 0 | Status: SHIPPED | OUTPATIENT
Start: 2020-11-05 | End: 2021-09-28 | Stop reason: ALTCHOICE

## 2020-12-02 DIAGNOSIS — I48.91 ATRIAL FIBRILLATION, UNSPECIFIED TYPE (HCC): ICD-10-CM

## 2020-12-03 ENCOUNTER — OFFICE VISIT (OUTPATIENT)
Dept: CARDIOLOGY CLINIC | Facility: CLINIC | Age: 55
End: 2020-12-03
Payer: COMMERCIAL

## 2020-12-03 VITALS
SYSTOLIC BLOOD PRESSURE: 140 MMHG | BODY MASS INDEX: 30.72 KG/M2 | OXYGEN SATURATION: 99 % | HEART RATE: 58 BPM | WEIGHT: 226.8 LBS | DIASTOLIC BLOOD PRESSURE: 96 MMHG | HEIGHT: 72 IN

## 2020-12-03 DIAGNOSIS — E78.5 DYSLIPIDEMIA: ICD-10-CM

## 2020-12-03 DIAGNOSIS — I10 BENIGN ESSENTIAL HYPERTENSION: Primary | ICD-10-CM

## 2020-12-03 DIAGNOSIS — R06.81 APNEA: ICD-10-CM

## 2020-12-03 DIAGNOSIS — I49.3 PVC'S (PREMATURE VENTRICULAR CONTRACTIONS): ICD-10-CM

## 2020-12-03 DIAGNOSIS — I48.0 PAROXYSMAL ATRIAL FIBRILLATION (HCC): ICD-10-CM

## 2020-12-03 PROCEDURE — 1036F TOBACCO NON-USER: CPT | Performed by: INTERNAL MEDICINE

## 2020-12-03 PROCEDURE — 3008F BODY MASS INDEX DOCD: CPT | Performed by: INTERNAL MEDICINE

## 2020-12-03 PROCEDURE — 99214 OFFICE O/P EST MOD 30 MIN: CPT | Performed by: INTERNAL MEDICINE

## 2020-12-03 PROCEDURE — 3080F DIAST BP >= 90 MM HG: CPT | Performed by: INTERNAL MEDICINE

## 2020-12-03 PROCEDURE — 3077F SYST BP >= 140 MM HG: CPT | Performed by: INTERNAL MEDICINE

## 2020-12-05 RX ORDER — RIVAROXABAN 20 MG/1
TABLET, FILM COATED ORAL
Qty: 30 TABLET | Refills: 1 | Status: SHIPPED | OUTPATIENT
Start: 2020-12-05 | End: 2021-02-01

## 2020-12-16 PROBLEM — I49.3 PVC'S (PREMATURE VENTRICULAR CONTRACTIONS): Status: ACTIVE | Noted: 2020-12-16

## 2020-12-16 PROBLEM — I48.0 PAROXYSMAL ATRIAL FIBRILLATION (HCC): Status: ACTIVE | Noted: 2020-12-16

## 2021-01-04 DIAGNOSIS — F41.9 ANXIETY: ICD-10-CM

## 2021-01-04 RX ORDER — FLUOXETINE 10 MG/1
TABLET, FILM COATED ORAL
Qty: 30 TABLET | Refills: 1 | Status: SHIPPED | OUTPATIENT
Start: 2021-01-04 | End: 2021-04-06

## 2021-01-31 DIAGNOSIS — I48.91 ATRIAL FIBRILLATION, UNSPECIFIED TYPE (HCC): ICD-10-CM

## 2021-02-01 RX ORDER — RIVAROXABAN 20 MG/1
TABLET, FILM COATED ORAL
Qty: 30 TABLET | Refills: 1 | Status: SHIPPED | OUTPATIENT
Start: 2021-02-01 | End: 2021-04-02

## 2021-02-15 ENCOUNTER — OFFICE VISIT (OUTPATIENT)
Dept: FAMILY MEDICINE CLINIC | Facility: CLINIC | Age: 56
End: 2021-02-15
Payer: COMMERCIAL

## 2021-02-15 VITALS
OXYGEN SATURATION: 96 % | DIASTOLIC BLOOD PRESSURE: 100 MMHG | BODY MASS INDEX: 30.48 KG/M2 | SYSTOLIC BLOOD PRESSURE: 146 MMHG | HEIGHT: 72 IN | WEIGHT: 225 LBS

## 2021-02-15 DIAGNOSIS — I48.0 PAROXYSMAL ATRIAL FIBRILLATION (HCC): Primary | ICD-10-CM

## 2021-02-15 PROCEDURE — 99214 OFFICE O/P EST MOD 30 MIN: CPT | Performed by: FAMILY MEDICINE

## 2021-02-15 PROCEDURE — 3077F SYST BP >= 140 MM HG: CPT | Performed by: FAMILY MEDICINE

## 2021-02-15 PROCEDURE — 3080F DIAST BP >= 90 MM HG: CPT | Performed by: FAMILY MEDICINE

## 2021-02-15 PROCEDURE — 3008F BODY MASS INDEX DOCD: CPT | Performed by: FAMILY MEDICINE

## 2021-02-15 NOTE — PROGRESS NOTES
Assessment/Plan:  Benign essential hypertension    The patient's blood pressure is suboptimally controlled with Ziac 10 b i d  He is also having issues with erectile dysfunction  We are going to have him cut back on his dose to Ziac to 10 once a day  Additionally he is going to resume his Norvasc at 10 mg daily  Wife's going to check his blood pressure regularly and call with report over the next week  Will call sooner should he note that his blood pressure control is worsening  We had a long discussion of the potential consequences of uncontrolled hypertension and he agrees with this plan  Obstructive sleep apnea    He is not 100% compliant with his sleep apnea  He may do better with nasal mask  He is going to look in to trying 1 so that he can be more compliant with his CPAP which certainly may be contributing to his worsening blood pressure control  Paroxysmal atrial fibrillation (HCC)    Continue with Xarelto  Rhythm is regular and rate is in the acceptable range today  Diagnoses and all orders for this visit:    Paroxysmal atrial fibrillation ([address] )          Subjective:   Chief Complaint   Patient presents with    Hypertension     Elevated bp readings  More so diastolic  Today was 150/110 at home  C/O headaches        Patient ID: Mavis Sevilla is a 48 y o  male  After starting CPAP his BP was improved  More recent development of Has  120-150 / 100-110  Developing ED  On Xarelto  No CP, SOB or edema  No sx  AFIB  HPI       Patient is a 60-year-old male with a history of essential hypertension, asthma, paroxysmal atrial fibrillation in addition to anxiety and depression who presents today with concerns over worsening blood pressure control  His wife is an RN has been taking his blood pressures at home  She states that they been ranging as high as 150/110  He has been compliant with his Xarelto    He wonders if this is contributing to some shortness of breath that he has been experiencing  He has also had worsening headaches  He has no chest pain  He has no edema  He has had some worsening symptoms of erectile dysfunction  He has not been aware of any rapid or irregular heartbeat  The following portions of the patient's history were reviewed and updated as appropriate: allergies, current medications, past family history, past medical history, past social history, past surgical history and problem list     Review of Systems   Constitution: Negative  Cardiovascular: Negative  Respiratory: Positive for shortness of breath  Negative for cough and wheezing  Endocrine: Negative  Hematologic/Lymphatic: Negative  Gastrointestinal: Negative for constipation and diarrhea  Genitourinary:        ED   Neurological: Positive for headaches  Psychiatric/Behavioral: Negative  Objective:    Physical Exam   Constitutional: He is oriented to person, place, and time  He appears well-developed  Overweight in no distress   Neck: No JVD present  No thyromegaly present  Cardiovascular: Normal rate, regular rhythm and normal heart sounds  Rhythm is regular with a rate in the 70s   Pulmonary/Chest: Effort normal and breath sounds normal  No respiratory distress  He has no wheezes  He has no rales  Musculoskeletal:         General: No edema  Lymphadenopathy:     He has no cervical adenopathy  Neurological: He is alert and oriented to person, place, and time  Psychiatric: He has a normal mood and affect  Judgment and thought content normal    Nursing note and vitals reviewed

## 2021-02-15 NOTE — ASSESSMENT & PLAN NOTE
The patient's blood pressure is suboptimally controlled with Ziac 10 b i d  He is also having issues with erectile dysfunction  We are going to have him cut back on his dose to Ziac to 10 once a day  Additionally he is going to resume his Norvasc at 10 mg daily  Wife's going to check his blood pressure regularly and call with report over the next week  Will call sooner should he note that his blood pressure control is worsening  We had a long discussion of the potential consequences of uncontrolled hypertension and he agrees with this plan

## 2021-02-15 NOTE — ASSESSMENT & PLAN NOTE
He is not 100% compliant with his sleep apnea  He may do better with nasal mask  He is going to look in to trying 1 so that he can be more compliant with his CPAP which certainly may be contributing to his worsening blood pressure control

## 2021-03-02 DIAGNOSIS — I10 BENIGN ESSENTIAL HYPERTENSION: ICD-10-CM

## 2021-03-03 RX ORDER — BISOPROLOL FUMARATE AND HYDROCHLOROTHIAZIDE 10; 6.25 MG/1; MG/1
1 TABLET ORAL DAILY
Qty: 90 TABLET | Refills: 1 | Status: SHIPPED | OUTPATIENT
Start: 2021-03-03 | End: 2021-04-06

## 2021-03-05 DIAGNOSIS — F41.9 ANXIETY: ICD-10-CM

## 2021-03-05 DIAGNOSIS — I10 BENIGN ESSENTIAL HYPERTENSION: ICD-10-CM

## 2021-03-05 RX ORDER — BISOPROLOL FUMARATE AND HYDROCHLOROTHIAZIDE 10; 6.25 MG/1; MG/1
TABLET ORAL
Qty: 180 TABLET | Refills: 1 | OUTPATIENT
Start: 2021-03-05

## 2021-03-05 RX ORDER — FLUOXETINE 10 MG/1
TABLET, FILM COATED ORAL
Qty: 30 TABLET | Refills: 1 | OUTPATIENT
Start: 2021-03-05

## 2021-03-11 DIAGNOSIS — I10 ESSENTIAL HYPERTENSION: ICD-10-CM

## 2021-03-12 RX ORDER — AMLODIPINE BESYLATE 10 MG/1
TABLET ORAL
Qty: 30 TABLET | Refills: 0 | Status: SHIPPED | OUTPATIENT
Start: 2021-03-12 | End: 2021-04-24

## 2021-04-01 DIAGNOSIS — I48.91 ATRIAL FIBRILLATION, UNSPECIFIED TYPE (HCC): ICD-10-CM

## 2021-04-01 DIAGNOSIS — K21.00 ESOPHAGITIS, REFLUX: ICD-10-CM

## 2021-04-02 RX ORDER — PANTOPRAZOLE SODIUM 40 MG/1
TABLET, DELAYED RELEASE ORAL
Qty: 90 TABLET | Refills: 1 | Status: SHIPPED | OUTPATIENT
Start: 2021-04-02 | End: 2021-09-28

## 2021-04-02 RX ORDER — RIVAROXABAN 20 MG/1
TABLET, FILM COATED ORAL
Qty: 30 TABLET | Refills: 1 | Status: SHIPPED | OUTPATIENT
Start: 2021-04-02 | End: 2021-04-06

## 2021-04-04 DIAGNOSIS — I10 BENIGN ESSENTIAL HYPERTENSION: ICD-10-CM

## 2021-04-04 DIAGNOSIS — I48.91 ATRIAL FIBRILLATION, UNSPECIFIED TYPE (HCC): ICD-10-CM

## 2021-04-04 DIAGNOSIS — F41.9 ANXIETY: ICD-10-CM

## 2021-04-06 RX ORDER — RIVAROXABAN 20 MG/1
TABLET, FILM COATED ORAL
Qty: 30 TABLET | Refills: 1 | Status: SHIPPED | OUTPATIENT
Start: 2021-04-06 | End: 2021-06-03

## 2021-04-06 RX ORDER — FLUOXETINE 10 MG/1
TABLET, FILM COATED ORAL
Qty: 30 TABLET | Refills: 1 | Status: SHIPPED | OUTPATIENT
Start: 2021-04-06 | End: 2021-06-03

## 2021-04-06 RX ORDER — BISOPROLOL FUMARATE AND HYDROCHLOROTHIAZIDE 10; 6.25 MG/1; MG/1
TABLET ORAL
Qty: 180 TABLET | Refills: 1 | Status: SHIPPED | OUTPATIENT
Start: 2021-04-06 | End: 2021-09-28

## 2021-04-24 DIAGNOSIS — I10 ESSENTIAL HYPERTENSION: ICD-10-CM

## 2021-04-24 RX ORDER — AMLODIPINE BESYLATE 10 MG/1
TABLET ORAL
Qty: 30 TABLET | Refills: 3 | Status: SHIPPED | OUTPATIENT
Start: 2021-04-24 | End: 2021-08-29

## 2021-06-03 DIAGNOSIS — F41.9 ANXIETY: ICD-10-CM

## 2021-06-03 DIAGNOSIS — I48.91 ATRIAL FIBRILLATION, UNSPECIFIED TYPE (HCC): ICD-10-CM

## 2021-06-03 RX ORDER — FLUOXETINE 10 MG/1
TABLET, FILM COATED ORAL
Qty: 30 TABLET | Refills: 1 | Status: SHIPPED | OUTPATIENT
Start: 2021-06-03 | End: 2021-07-30

## 2021-06-03 RX ORDER — RIVAROXABAN 20 MG/1
TABLET, FILM COATED ORAL
Qty: 30 TABLET | Refills: 1 | Status: SHIPPED | OUTPATIENT
Start: 2021-06-03 | End: 2021-07-30

## 2021-07-30 DIAGNOSIS — I48.91 ATRIAL FIBRILLATION, UNSPECIFIED TYPE (HCC): ICD-10-CM

## 2021-07-30 DIAGNOSIS — F41.9 ANXIETY: ICD-10-CM

## 2021-07-30 RX ORDER — RIVAROXABAN 20 MG/1
TABLET, FILM COATED ORAL
Qty: 30 TABLET | Refills: 1 | Status: SHIPPED | OUTPATIENT
Start: 2021-07-30 | End: 2021-12-08

## 2021-07-30 RX ORDER — FLUOXETINE 10 MG/1
TABLET, FILM COATED ORAL
Qty: 30 TABLET | Refills: 1 | Status: SHIPPED | OUTPATIENT
Start: 2021-07-30 | End: 2021-09-28

## 2021-08-09 ENCOUNTER — TELEPHONE (OUTPATIENT)
Dept: FAMILY MEDICINE CLINIC | Facility: CLINIC | Age: 56
End: 2021-08-09

## 2021-08-10 ENCOUNTER — TELEMEDICINE (OUTPATIENT)
Dept: FAMILY MEDICINE CLINIC | Facility: CLINIC | Age: 56
End: 2021-08-10
Payer: COMMERCIAL

## 2021-08-10 VITALS — BODY MASS INDEX: 29.8 KG/M2 | HEIGHT: 72 IN | WEIGHT: 220 LBS

## 2021-08-10 DIAGNOSIS — S29.011A INTERCOSTAL MUSCLE STRAIN, INITIAL ENCOUNTER: Primary | ICD-10-CM

## 2021-08-10 DIAGNOSIS — M54.2 CERVICALGIA: ICD-10-CM

## 2021-08-10 DIAGNOSIS — I48.0 PAROXYSMAL ATRIAL FIBRILLATION (HCC): ICD-10-CM

## 2021-08-10 DIAGNOSIS — I10 BENIGN ESSENTIAL HYPERTENSION: ICD-10-CM

## 2021-08-10 PROCEDURE — 99214 OFFICE O/P EST MOD 30 MIN: CPT | Performed by: FAMILY MEDICINE

## 2021-08-10 RX ORDER — METHOCARBAMOL 750 MG/1
750 TABLET, FILM COATED ORAL EVERY 6 HOURS PRN
Qty: 40 TABLET | Refills: 0 | Status: SHIPPED | OUTPATIENT
Start: 2021-08-10 | End: 2021-09-28 | Stop reason: ALTCHOICE

## 2021-08-10 NOTE — ASSESSMENT & PLAN NOTE
Based on the patient's history and virtual examination I believe he is suffering from a left infracostal muscular strain  He is going to continue to use ice and rest   We are going to give him some Robaxin to try this evening  He is asked call tomorrow with report of his condition  It does not appear that this represents a acute intrathoracic condition  Should he develop any worsening of his symptoms of significant see is asked call or seek more urgent medical attention as appropriate  He agrees with this plan

## 2021-08-10 NOTE — PROGRESS NOTES
Virtual Regular Visit    Verification of patient location:    Patient is located in the following state in which I hold an active license [place]      Assessment/Plan:    Problem List Items Addressed This Visit        Cardiovascular and Mediastinum    Paroxysmal atrial fibrillation (HCC)      No symptomatic cardiac irregularity  He is weaning down on his Xarelto         Benign essential hypertension      Continue current antihypertensive regimen  Musculoskeletal and Integument    Intercostal muscle strain - Primary      Based on the patient's history and virtual examination I believe he is suffering from a left infracostal muscular strain  He is going to continue to use ice and rest   We are going to give him some Robaxin to try this evening  He is asked call tomorrow with report of his condition  It does not appear that this represents a acute intrathoracic condition  Should he develop any worsening of his symptoms of significant see is asked call or seek more urgent medical attention as appropriate  He agrees with this plan  Relevant Medications    methocarbamol (ROBAXIN) 750 mg tablet      Other Visit Diagnoses     Cervicalgia              BMI Counseling: Body mass index is 29 84 kg/m²  The BMI is above normal  Nutrition recommendations include decreasing portion sizes, encouraging healthy choices of fruits and vegetables, decreasing fast food intake, consuming healthier snacks, limiting drinks that contain sugar and moderation in carbohydrate intake  Exercise recommendations include moderate physical activity 150 minutes/week and exercising 3-5 times per week  No pharmacotherapy was ordered  Reason for visit is   Chief Complaint   Patient presents with    Back Pain     Pulled muscle in back yesterday      Virtual Regular Visit        Encounter provider Joelle Espinoza MD    Provider located at [address] [place] [address]      Recent Visits  Date Type Provider Dept   08/09/21 Telephone [facility]   Showing recent visits within past 7 days and meeting all other requirements  Today's Visits  Date Type Provider Dept   08/10/21 Telemedicine MD [facility]   Showing today's visits and meeting all other requirements  Future Appointments  No visits were found meeting these conditions  Showing future appointments within next 150 days and meeting all other requirements       The patient was identified by name and date of birth  Addie Mac was informed that this is a telemedicine visit and that the visit is being conducted through [address] [place] [address] [facility] and patient was informed that this is a secure, HIPAA-compliant platform  He agrees to proceed     My office door was closed  No one else was in the room  He acknowledged consent and understanding of privacy and security of the video platform  The patient has agreed to participate and understands they can discontinue the visit at any time  Patient is aware this is a billable service  Subjective  Addie Mac is a 48 y o  male   I got out of the pool today and tweaked a muscle  Today I twisted and it got much worse  From middle of chest to back rib  Twisting severely painful  L sided infracostal, twisting worse lifting arm causes pain  Always same place  A year or so ago  Twisted to the L to get of floaty in pool  Pleuritic  Worse with a twist  No SOB  No cough  No fever, SOB  HPI   The patient is a 60-year-old male with a history of asthma, hypertension, paroxysmal atrial fibrillation as well as anxiety and depression in addition to dyslipidemia among other who presents today stating that he got out of the pool yesterday and "tweaked a muscle"  He states that he rolled over to get off of the float and noted pain in his left infra costal region  It radiated around to his back  He states that it was uncomfortable    Today he twisted to lift something and it became acutely worse  He states he has had this in the past multiple times  It is not exertional   There is no shortness of breath associated with it  It is pleuritic with deep breathing and coughing  Any twisting makes it worse  There is no rash  He has no fevers chills or constitutional symptoms otherwise    Past Medical History:   Diagnosis Date    Exudative pharyngitis 5/20/2020    GERD (gastroesophageal reflux disease)     Hypertension        Past Surgical History:   Procedure Laterality Date    COLONOSCOPY      FACIAL/NECK BIOPSY N/A 8/14/2018    Procedure: EXCISION BIOPSY LESION POSTERIOR NECK;  Surgeon: Maxwell Acosta MD;  Location[facility];  Service: General       Current Outpatient Medications   Medication Sig Dispense Refill    ALPRAZolam (XANAX) 0 25 mg tablet Take 1 tablet (0 25 mg total) by mouth daily at bedtime as needed for anxiety 30 tablet 0    amLODIPine (NORVASC) 10 mg tablet TAKE ONE TABLET BY MOUTH EVERY DAY 30 tablet 3    bisoprolol-hydrochlorothiazide (ZIAC) 10-6 25 MG per tablet TAKE ONE TABLET BY MOUTH TWICE A  tablet 1    Cholecalciferol (Vitamin D3) 250 MCG (68009 UT) TABS Take by mouth      FLUoxetine (PROzac) 10 MG tablet TAKE ONE TABLET BY MOUTH EVERY DAY (Patient taking differently: Take 5 mg by mouth daily ) 30 tablet 1    pantoprazole (PROTONIX) 40 mg tablet TAKE ONE TABLET BY MOUTH EVERY DAY 90 tablet 1    Xarelto 20 MG tablet TAKE ONE TABLET BY MOUTH EVERY DAY WITH BREAKFAST (Patient taking differently: 5 mg ) 30 tablet 1    aspirin (ECOTRIN LOW STRENGTH) 81 mg EC tablet Take 81 mg by mouth daily (Patient not taking: Reported on 8/10/2021)      Lactobacillus (ACIDOPHILUS PO) Take by mouth (Patient not taking: Reported on 8/10/2021)      losartan (COZAAR) 50 mg tablet TAKE ONE TABLET BY MOUTH EVERY DAY (Patient not taking: Reported on 12/3/2020) 30 tablet 0    methocarbamol (ROBAXIN) 750 mg tablet Take 1 tablet (750 mg total) by mouth every 6 (six) hours as needed for muscle spasms 40 tablet 0    niacin 500 mg tablet Take 500 mg by mouth daily with breakfast (Patient not taking: Reported on 8/10/2021)      Omega-3 Fatty Acids (fish oil) 1,000 mg Take 2,000 mg by mouth daily (Patient not taking: Reported on 8/10/2021)       No current facility-administered medications for this visit  Allergies   Allergen Reactions    Bactrim [Sulfamethoxazole-Trimethoprim] Diarrhea     vomiting    Penicillins Diarrhea    Pravastatin Palpitations       Review of Systems    Limited and pertinent is per the HPI  Video Exam    Vitals:    08/10/21 1709   Weight: 99 8 kg (220 lb)   Height: 6' (1 829 m)       Physical Exam  Vitals and nursing note reviewed  Constitutional:       General: He is not in acute distress  Appearance: Normal appearance  He is not ill-appearing  Comments: He is lying recumbent in bed  When he moves the side he appears to experience pain  Pulmonary:      Effort: Pulmonary effort is normal  No respiratory distress  Neurological:      Mental Status: He is alert and oriented to person, place, and time  Psychiatric:         Mood and Affect: Mood normal          Thought Content: Thought content normal          Judgment: Judgment normal           I spent 15 minutes directly with the patient during this visit    Ge Scott Jared verbally agrees to participate in [employer]  Pt is aware that [employer] could be limited without vital signs or the ability to perform a full hands-on physical exam  Otis Robledo understands he or the provider may request at any time to terminate the video visit and request the patient to seek care or treatment in person

## 2021-08-11 DIAGNOSIS — S29.011A INTERCOSTAL MUSCLE STRAIN, INITIAL ENCOUNTER: Primary | ICD-10-CM

## 2021-08-11 RX ORDER — METHYLPREDNISOLONE 4 MG/1
TABLET ORAL
Qty: 21 EACH | Refills: 0 | Status: SHIPPED | OUTPATIENT
Start: 2021-08-11 | End: 2021-09-28 | Stop reason: ALTCHOICE

## 2021-09-04 NOTE — PROGRESS NOTES
Patient's wife called stating that he has recurrence of Rhus dermatitis would like a refill of a Medrol Dosepak  We provided same  Follow-up as needed  no

## 2021-09-28 ENCOUNTER — OFFICE VISIT (OUTPATIENT)
Dept: FAMILY MEDICINE CLINIC | Facility: CLINIC | Age: 56
End: 2021-09-28
Payer: COMMERCIAL

## 2021-09-28 VITALS
HEIGHT: 72 IN | DIASTOLIC BLOOD PRESSURE: 80 MMHG | SYSTOLIC BLOOD PRESSURE: 118 MMHG | BODY MASS INDEX: 30.75 KG/M2 | WEIGHT: 227 LBS

## 2021-09-28 DIAGNOSIS — I48.0 PAROXYSMAL ATRIAL FIBRILLATION (HCC): ICD-10-CM

## 2021-09-28 DIAGNOSIS — I10 BENIGN ESSENTIAL HYPERTENSION: ICD-10-CM

## 2021-09-28 DIAGNOSIS — N52.2 DRUG-INDUCED ERECTILE DYSFUNCTION: ICD-10-CM

## 2021-09-28 DIAGNOSIS — I10 BENIGN ESSENTIAL HYPERTENSION: Primary | ICD-10-CM

## 2021-09-28 DIAGNOSIS — F41.9 ANXIETY: ICD-10-CM

## 2021-09-28 DIAGNOSIS — R53.83 FATIGUE, UNSPECIFIED TYPE: ICD-10-CM

## 2021-09-28 DIAGNOSIS — K21.00 ESOPHAGITIS, REFLUX: ICD-10-CM

## 2021-09-28 DIAGNOSIS — I10 ESSENTIAL HYPERTENSION: ICD-10-CM

## 2021-09-28 PROCEDURE — 1036F TOBACCO NON-USER: CPT | Performed by: FAMILY MEDICINE

## 2021-09-28 PROCEDURE — 3074F SYST BP LT 130 MM HG: CPT | Performed by: FAMILY MEDICINE

## 2021-09-28 PROCEDURE — 99214 OFFICE O/P EST MOD 30 MIN: CPT | Performed by: FAMILY MEDICINE

## 2021-09-28 PROCEDURE — 3008F BODY MASS INDEX DOCD: CPT | Performed by: FAMILY MEDICINE

## 2021-09-28 PROCEDURE — 3079F DIAST BP 80-89 MM HG: CPT | Performed by: FAMILY MEDICINE

## 2021-09-28 RX ORDER — PANTOPRAZOLE SODIUM 40 MG/1
TABLET, DELAYED RELEASE ORAL
Qty: 90 TABLET | Refills: 1 | Status: SHIPPED | OUTPATIENT
Start: 2021-09-28 | End: 2022-03-28

## 2021-09-28 RX ORDER — FLUOXETINE 10 MG/1
TABLET, FILM COATED ORAL
Qty: 30 TABLET | Refills: 1 | Status: SHIPPED | OUTPATIENT
Start: 2021-09-28 | End: 2021-11-29

## 2021-09-28 RX ORDER — DOXAZOSIN MESYLATE 1 MG/1
1 TABLET ORAL
Qty: 30 TABLET | Refills: 1 | Status: SHIPPED | OUTPATIENT
Start: 2021-09-28 | End: 2021-10-22

## 2021-09-28 RX ORDER — AMLODIPINE BESYLATE 10 MG/1
TABLET ORAL
Qty: 30 TABLET | Refills: 0 | Status: SHIPPED | OUTPATIENT
Start: 2021-09-28 | End: 2021-10-28

## 2021-09-28 RX ORDER — BISOPROLOL FUMARATE AND HYDROCHLOROTHIAZIDE 10; 6.25 MG/1; MG/1
TABLET ORAL
Qty: 180 TABLET | Refills: 1 | Status: SHIPPED | OUTPATIENT
Start: 2021-09-28 | End: 2022-03-28

## 2021-09-28 NOTE — ASSESSMENT & PLAN NOTE
We are going to try to wean down his Ziac and replaced with Cardura  Will continue with amlodipine  Will follow up with him in the near future  We are not able to improve his erectile dysfunction with alteration of his antihypertensive regimen, we will consider Viagra X cetera

## 2021-09-28 NOTE — PROGRESS NOTES
Assessment/Plan:  Benign essential hypertension  The patient is a 73-year-old male who presents today for routine follow-up essential hypertension  He has been having issues with erectile dysfunction as well as some vague shortness of breath  We are going to have him continue with amlodipine 10  He is going to wean down his Ziac 10 from 1 b i d  to 1 daily  He is also going to begin Cardura 1 mg at bedtime  He is asked call in a week to 10 days with report of his condition  In the meantime he is asked to get CBC, CMP, lipids as well as a TSH  He agrees with this plan    Paroxysmal atrial fibrillation (HCC)  Regular rhythm today  He discontinued his Xarelto  He continues with his daily baby aspirin  Drug-induced erectile dysfunction  We are going to try to wean down his Ziac and replaced with Cardura  Will continue with amlodipine  Will follow up with him in the near future  We are not able to improve his erectile dysfunction with alteration of his antihypertensive regimen, we will consider Viagra X cetera  [facility]  CBC,CMP and lipids       Diagnoses and all orders for this visit:    Benign essential hypertension  -     doxazosin (CARDURA) 1 mg tablet; Take 1 tablet (1 mg total) by mouth daily at bedtime    Paroxysmal atrial fibrillation (HCC)    Drug-induced erectile dysfunction          Subjective:   Chief Complaint   Patient presents with    Hypertension     Med Check        Patient ID: Merline Stack is a 48 y o  male  Covid vaccine  I feel winded and have ED from the [address] [place] [address]  Home Bps are 130s/100s  HPI       The patient is a 73-year-old male who presents today follow-up of essential hypertension  He states that he has been compliant with Ziac 10 b i d  as well as amlodipine 10 daily  He notes his home blood pressures have been 130s over 100s  He states that he feels winded  He also has been having issues with erectile dysfunction  No headache or diplopia  No cardiovascular symptomatology  He has not been vaccinated against COVID  He has concerns related to same and his heart condition  The following portions of the patient's history were reviewed and updated as appropriate: allergies, current medications, past family history, past medical history, past social history, past surgical history and problem list     Review of Systems   Constitutional: Negative  Cardiovascular: Negative  Respiratory: Positive for shortness of breath  Endocrine: Negative  Hematologic/Lymphatic: Negative  Genitourinary: Negative for decreased libido  Erectile dysfunction   Neurological: Negative  Psychiatric/Behavioral: Negative  All other systems reviewed and are negative  Objective:    Physical Exam  Constitutional:       Appearance: He is not ill-appearing  Neck:      Comments: No thyroid enlargement or irregularity  Cardiovascular:      Rate and Rhythm: Normal rate and regular rhythm  Pulses: Normal pulses  Heart sounds: Normal heart sounds  No murmur heard  No gallop  Pulmonary:      Effort: Pulmonary effort is normal       Breath sounds: Normal breath sounds  No wheezing or rales  Musculoskeletal:      Right lower leg: No edema  Left lower leg: No edema  Neurological:      Mental Status: He is alert and oriented to person, place, and time  Psychiatric:         Mood and Affect: Mood normal          Thought Content:  Thought content normal          Judgment: Judgment normal

## 2021-09-28 NOTE — ASSESSMENT & PLAN NOTE
The patient is a 52-year-old male who presents today for routine follow-up essential hypertension  He has been having issues with erectile dysfunction as well as some vague shortness of breath  We are going to have him continue with amlodipine 10  He is going to wean down his Ziac 10 from 1 b i d  to 1 daily  He is also going to begin Cardura 1 mg at bedtime  He is asked call in a week to 10 days with report of his condition  In the meantime he is asked to get CBC, CMP, lipids as well as a TSH    He agrees with this plan

## 2021-10-08 DIAGNOSIS — F41.9 ANXIETY: ICD-10-CM

## 2021-10-08 RX ORDER — ALPRAZOLAM 0.25 MG/1
0.25 TABLET ORAL
Qty: 30 TABLET | Refills: 0 | Status: SHIPPED | OUTPATIENT
Start: 2021-10-08

## 2021-10-22 ENCOUNTER — OFFICE VISIT (OUTPATIENT)
Dept: FAMILY MEDICINE CLINIC | Facility: CLINIC | Age: 56
End: 2021-10-22
Payer: COMMERCIAL

## 2021-10-22 VITALS
BODY MASS INDEX: 31.56 KG/M2 | TEMPERATURE: 98.1 F | SYSTOLIC BLOOD PRESSURE: 116 MMHG | DIASTOLIC BLOOD PRESSURE: 78 MMHG | WEIGHT: 233 LBS | HEART RATE: 73 BPM | HEIGHT: 72 IN

## 2021-10-22 DIAGNOSIS — I10 BENIGN ESSENTIAL HYPERTENSION: ICD-10-CM

## 2021-10-22 DIAGNOSIS — N63.20 LEFT BREAST MASS: Primary | ICD-10-CM

## 2021-10-22 PROCEDURE — 99214 OFFICE O/P EST MOD 30 MIN: CPT | Performed by: FAMILY MEDICINE

## 2021-10-22 PROCEDURE — 3074F SYST BP LT 130 MM HG: CPT | Performed by: FAMILY MEDICINE

## 2021-10-22 PROCEDURE — 3078F DIAST BP <80 MM HG: CPT | Performed by: FAMILY MEDICINE

## 2021-10-22 PROCEDURE — 3008F BODY MASS INDEX DOCD: CPT | Performed by: FAMILY MEDICINE

## 2021-10-22 PROCEDURE — 1036F TOBACCO NON-USER: CPT | Performed by: FAMILY MEDICINE

## 2021-11-18 ENCOUNTER — HOSPITAL ENCOUNTER (OUTPATIENT)
Dept: ULTRASOUND IMAGING | Facility: CLINIC | Age: 56
Discharge: HOME | End: 2021-11-18
Payer: COMMERCIAL

## 2021-11-18 ENCOUNTER — HOSPITAL ENCOUNTER (OUTPATIENT)
Dept: MAMMOGRAPHY | Facility: CLINIC | Age: 56
Discharge: HOME | End: 2021-11-18
Payer: COMMERCIAL

## 2021-11-18 DIAGNOSIS — N63.20 LEFT BREAST MASS: ICD-10-CM

## 2021-11-18 PROCEDURE — 76642 ULTRASOUND BREAST LIMITED: CPT

## 2021-11-18 PROCEDURE — 77066 DX MAMMO INCL CAD BI: CPT

## 2021-11-18 PROCEDURE — G0279 TOMOSYNTHESIS, MAMMO: HCPCS

## 2021-11-19 DIAGNOSIS — N52.2 DRUG-INDUCED ERECTILE DYSFUNCTION: Primary | ICD-10-CM

## 2021-11-19 PROBLEM — N62 GYNECOMASTIA, MALE: Status: ACTIVE | Noted: 2021-10-22

## 2021-11-19 RX ORDER — SILDENAFIL 100 MG/1
100 TABLET, FILM COATED ORAL DAILY PRN
Qty: 10 TABLET | Refills: 0 | Status: SHIPPED | OUTPATIENT
Start: 2021-11-19

## 2021-11-27 DIAGNOSIS — F41.9 ANXIETY: ICD-10-CM

## 2021-11-29 RX ORDER — FLUOXETINE 10 MG/1
TABLET, FILM COATED ORAL
Qty: 30 TABLET | Refills: 1 | Status: SHIPPED | OUTPATIENT
Start: 2021-11-29 | End: 2022-01-26

## 2021-12-08 ENCOUNTER — OFFICE VISIT (OUTPATIENT)
Dept: CARDIOLOGY CLINIC | Facility: CLINIC | Age: 56
End: 2021-12-08
Payer: COMMERCIAL

## 2021-12-08 VITALS
BODY MASS INDEX: 31.59 KG/M2 | HEART RATE: 60 BPM | HEIGHT: 72 IN | DIASTOLIC BLOOD PRESSURE: 88 MMHG | RESPIRATION RATE: 16 BRPM | WEIGHT: 233.2 LBS | SYSTOLIC BLOOD PRESSURE: 126 MMHG

## 2021-12-08 DIAGNOSIS — G47.33 OBSTRUCTIVE SLEEP APNEA: ICD-10-CM

## 2021-12-08 DIAGNOSIS — I49.3 PVC'S (PREMATURE VENTRICULAR CONTRACTIONS): ICD-10-CM

## 2021-12-08 DIAGNOSIS — I48.0 PAROXYSMAL ATRIAL FIBRILLATION (HCC): ICD-10-CM

## 2021-12-08 DIAGNOSIS — D75.1 POLYCYTHEMIA: ICD-10-CM

## 2021-12-08 DIAGNOSIS — I10 BENIGN ESSENTIAL HYPERTENSION: Primary | ICD-10-CM

## 2021-12-08 PROCEDURE — 99214 OFFICE O/P EST MOD 30 MIN: CPT | Performed by: INTERNAL MEDICINE

## 2021-12-08 PROCEDURE — 93000 ELECTROCARDIOGRAM COMPLETE: CPT | Performed by: INTERNAL MEDICINE

## 2021-12-08 PROCEDURE — 3008F BODY MASS INDEX DOCD: CPT | Performed by: INTERNAL MEDICINE

## 2021-12-08 PROCEDURE — 3079F DIAST BP 80-89 MM HG: CPT | Performed by: INTERNAL MEDICINE

## 2021-12-08 PROCEDURE — 3074F SYST BP LT 130 MM HG: CPT | Performed by: INTERNAL MEDICINE

## 2021-12-08 PROCEDURE — 1036F TOBACCO NON-USER: CPT | Performed by: INTERNAL MEDICINE

## 2021-12-10 ENCOUNTER — TELEPHONE (OUTPATIENT)
Dept: HEMATOLOGY ONCOLOGY | Facility: CLINIC | Age: 56
End: 2021-12-10

## 2021-12-28 ENCOUNTER — TELEPHONE (OUTPATIENT)
Dept: HEMATOLOGY ONCOLOGY | Facility: CLINIC | Age: 56
End: 2021-12-28

## 2022-01-10 ENCOUNTER — TELEPHONE (OUTPATIENT)
Dept: HEMATOLOGY ONCOLOGY | Facility: CLINIC | Age: 57
End: 2022-01-10

## 2022-01-26 DIAGNOSIS — F41.9 ANXIETY: ICD-10-CM

## 2022-01-26 RX ORDER — FLUOXETINE 10 MG/1
TABLET, FILM COATED ORAL
Qty: 30 TABLET | Refills: 1 | Status: SHIPPED | OUTPATIENT
Start: 2022-01-26 | End: 2022-01-31

## 2022-01-29 DIAGNOSIS — F41.9 ANXIETY: ICD-10-CM

## 2022-01-31 RX ORDER — FLUOXETINE 10 MG/1
TABLET, FILM COATED ORAL
Qty: 30 TABLET | Refills: 1 | Status: SHIPPED | OUTPATIENT
Start: 2022-01-31 | End: 2022-03-28

## 2022-03-29 ENCOUNTER — OFFICE VISIT (OUTPATIENT)
Dept: FAMILY MEDICINE CLINIC | Facility: CLINIC | Age: 57
End: 2022-03-29
Payer: COMMERCIAL

## 2022-03-29 VITALS
DIASTOLIC BLOOD PRESSURE: 78 MMHG | HEIGHT: 72 IN | SYSTOLIC BLOOD PRESSURE: 112 MMHG | BODY MASS INDEX: 31.02 KG/M2 | WEIGHT: 229 LBS

## 2022-03-29 DIAGNOSIS — I10 BENIGN ESSENTIAL HYPERTENSION: ICD-10-CM

## 2022-03-29 DIAGNOSIS — N62 GYNECOMASTIA, MALE: ICD-10-CM

## 2022-03-29 DIAGNOSIS — K21.00 GASTROESOPHAGEAL REFLUX DISEASE WITH ESOPHAGITIS, UNSPECIFIED WHETHER HEMORRHAGE: ICD-10-CM

## 2022-03-29 DIAGNOSIS — I48.0 PAROXYSMAL ATRIAL FIBRILLATION (HCC): ICD-10-CM

## 2022-03-29 DIAGNOSIS — N52.2 DRUG-INDUCED ERECTILE DYSFUNCTION: ICD-10-CM

## 2022-03-29 DIAGNOSIS — F32.A DEPRESSION, UNSPECIFIED DEPRESSION TYPE: ICD-10-CM

## 2022-03-29 DIAGNOSIS — G47.33 OBSTRUCTIVE SLEEP APNEA: ICD-10-CM

## 2022-03-29 DIAGNOSIS — E78.5 DYSLIPIDEMIA: Primary | ICD-10-CM

## 2022-03-29 PROBLEM — S29.011A INTERCOSTAL MUSCLE STRAIN: Status: RESOLVED | Noted: 2021-08-10 | Resolved: 2022-03-29

## 2022-03-29 PROBLEM — H69.91 ETD (EUSTACHIAN TUBE DYSFUNCTION), RIGHT: Status: RESOLVED | Noted: 2018-04-06 | Resolved: 2022-03-29

## 2022-03-29 PROBLEM — H69.81 ETD (EUSTACHIAN TUBE DYSFUNCTION), RIGHT: Status: RESOLVED | Noted: 2018-04-06 | Resolved: 2022-03-29

## 2022-03-29 PROCEDURE — 3078F DIAST BP <80 MM HG: CPT | Performed by: FAMILY MEDICINE

## 2022-03-29 PROCEDURE — 99214 OFFICE O/P EST MOD 30 MIN: CPT | Performed by: FAMILY MEDICINE

## 2022-03-29 PROCEDURE — 1036F TOBACCO NON-USER: CPT | Performed by: FAMILY MEDICINE

## 2022-03-29 PROCEDURE — 3074F SYST BP LT 130 MM HG: CPT | Performed by: FAMILY MEDICINE

## 2022-03-29 PROCEDURE — 3008F BODY MASS INDEX DOCD: CPT | Performed by: FAMILY MEDICINE

## 2022-03-29 NOTE — ASSESSMENT & PLAN NOTE
Continue with Protonix  No odynophagia, dysphagia, anorexia weight loss or other constitutional symptom

## 2022-03-29 NOTE — ASSESSMENT & PLAN NOTE
Sildenafil remains effective    We are going to have him get testosterone and prolactin levels due to his

## 2022-03-29 NOTE — ASSESSMENT & PLAN NOTE
His ultrasound and mammography were normal   We are going to ask him to get prolactin and testosterone levels performed

## 2022-03-29 NOTE — PROGRESS NOTES
Assessment/Plan:  Obstructive sleep apnea  Trying to go back on CPAP    Esophagitis, reflux  Continue with Protonix  No odynophagia, dysphagia, anorexia weight loss or other constitutional symptom  Benign essential hypertension  Blood pressure well controlled  Continue with amlodipine as well as Ziac  Paroxysmal atrial fibrillation (HCC)  Regular rhythm today  He discontinued anticoagulation  He is encouraged to continue with his aspirin  Depression  Continue with fluoxetine  Dyslipidemia  Lipid profile today  Gynecomastia, male  His ultrasound and mammography were normal   We are going to ask him to get prolactin and testosterone levels performed  Drug-induced erectile dysfunction  Sildenafil remains effective  We are going to have him get testosterone and prolactin levels due to his         Diagnoses and all orders for this visit:    Dyslipidemia  -     Lipid panel; Future  -     TSH, 3rd generation with Free T4 reflex; Future  -     Lipid panel  -     TSH, 3rd generation with Free T4 reflex    Gynecomastia, male  -     Prolactin; Future  -     Testosterone, Total, LC/MS/MS; Future  -     Prolactin  -     Testosterone, Total, LC/MS/MS    Benign essential hypertension  -     Lipid panel; Future  -     CBC; Future  -     Comprehensive metabolic panel; Future  -     TSH, 3rd generation with Free T4 reflex; Future  -     Lipid panel  -     CBC  -     Comprehensive metabolic panel  -     TSH, 3rd generation with Free T4 reflex    Gastroesophageal reflux disease with esophagitis, unspecified whether hemorrhage    Obstructive sleep apnea    Paroxysmal atrial fibrillation (HCC)    Depression, unspecified depression type    Drug-induced erectile dysfunction          Subjective:   Chief Complaint   Patient presents with    Follow-up     Med Check        Patient ID: Ellis Boyce is a 55 y o  male  I am ok, but I have a weird SOB with bending  Lasts for a minute or 2    C/w protonix, occasional sildenafil, no recent use of alprazolam  Hemochromatosis  Needs ferritin  Prolactin, testosterone  Gynecomastia  No FH of colon ca  Cologuard    HPI  The patient is a 32-year-old male who presents today for routine follow-up of multiple medical problems including essential hypertension, gastroesophageal reflux disease, anxiety depression, dyslipidemia, drug-induced erectile dysfunction as well as gynecomastia and obstructive sleep apnea  He states that I am okay  He denies any cardiovascular complaint  He does admit to a weird shortness of breath with bending over  He states that last for minute her to but resolves  He believes it may be related to abdominal girth  He is compliant with his Protonix  He has no odynophagia, dysphagia or other constitutional symptoms  He states that he is taking 1/2 of his fluoxetine though not on a regular basis  He does feel that this prevents anxiety in certain situations  He continues with erectile dysfunction and he states that a small piece of his 100 mg Viagra/sildenafil tablet is effective  The following portions of the patient's history were reviewed and updated as appropriate: allergies, current medications, past family history, past medical history, past social history, past surgical history and problem list     ROS    Per the HPI  Objective:    Physical Exam  Vitals and nursing note reviewed  Constitutional:       Comments: Overweight in no apparent distress   Neck:      Vascular: No carotid bruit  Comments: No JVD  Cardiovascular:      Rate and Rhythm: Normal rate and regular rhythm  Pulmonary:      Effort: Pulmonary effort is normal       Breath sounds: Normal breath sounds  No wheezing, rhonchi or rales  Musculoskeletal:      Right lower leg: No edema  Left lower leg: No edema  Neurological:      Mental Status: He is alert and oriented to person, place, and time     Psychiatric:         Mood and Affect: Mood normal          Thought Content:  Thought content normal          Judgment: Judgment normal          Wt Readings from Last 12 Encounters:   03/29/22 104 kg (229 lb)   12/08/21 106 kg (233 lb 3 2 oz)   10/22/21 106 kg (233 lb)   09/28/21 103 kg (227 lb)   08/10/21 99 8 kg (220 lb)   02/15/21 102 kg (225 lb)   12/03/20 103 kg (226 lb 12 8 oz)   09/24/20 98 4 kg (217 lb)   09/11/20 96 2 kg (212 lb)   09/08/20 95 3 kg (210 lb)   08/14/20 95 3 kg (210 lb)   08/04/20 95 3 kg (210 lb)   ]

## 2022-03-29 NOTE — ASSESSMENT & PLAN NOTE
Regular rhythm today  He discontinued anticoagulation  He is encouraged to continue with his aspirin

## 2022-03-30 DIAGNOSIS — I10 ESSENTIAL HYPERTENSION: ICD-10-CM

## 2022-03-30 DIAGNOSIS — F41.9 ANXIETY: ICD-10-CM

## 2022-03-30 DIAGNOSIS — I10 BENIGN ESSENTIAL HYPERTENSION: ICD-10-CM

## 2022-03-30 DIAGNOSIS — K21.00 ESOPHAGITIS, REFLUX: ICD-10-CM

## 2022-03-30 RX ORDER — AMLODIPINE BESYLATE 10 MG/1
TABLET ORAL
Qty: 30 TABLET | Refills: 0 | Status: SHIPPED | OUTPATIENT
Start: 2022-03-30 | End: 2022-04-29

## 2022-03-30 RX ORDER — PANTOPRAZOLE SODIUM 40 MG/1
TABLET, DELAYED RELEASE ORAL
Qty: 90 TABLET | Refills: 1 | Status: SHIPPED | OUTPATIENT
Start: 2022-03-30

## 2022-03-30 RX ORDER — FLUOXETINE 10 MG/1
5 TABLET, FILM COATED ORAL DAILY
Qty: 45 TABLET | Refills: 1 | Status: SHIPPED | OUTPATIENT
Start: 2022-03-30 | End: 2022-06-28

## 2022-03-30 RX ORDER — BISOPROLOL FUMARATE AND HYDROCHLOROTHIAZIDE 10; 6.25 MG/1; MG/1
TABLET ORAL
Qty: 180 TABLET | Refills: 1 | Status: SHIPPED | OUTPATIENT
Start: 2022-03-30

## 2022-04-07 LAB
ALBUMIN SERPL-MCNC: 4.6 G/DL (ref 3.6–5.1)
ALBUMIN/GLOB SERPL: 1.5 (CALC) (ref 1–2.5)
ALP SERPL-CCNC: 99 U/L (ref 35–144)
ALT SERPL-CCNC: 59 U/L (ref 9–46)
AST SERPL-CCNC: 33 U/L (ref 10–35)
BILIRUB SERPL-MCNC: 0.6 MG/DL (ref 0.2–1.2)
BUN SERPL-MCNC: 24 MG/DL (ref 7–25)
BUN/CREAT SERPL: ABNORMAL (CALC) (ref 6–22)
CALCIUM SERPL-MCNC: 10.3 MG/DL (ref 8.6–10.3)
CHLORIDE SERPL-SCNC: 102 MMOL/L (ref 98–110)
CHOLEST SERPL-MCNC: 230 MG/DL
CHOLEST/HDLC SERPL: 7.4 (CALC)
CO2 SERPL-SCNC: 26 MMOL/L (ref 20–32)
CREAT SERPL-MCNC: 1.05 MG/DL (ref 0.7–1.33)
ERYTHROCYTE [DISTWIDTH] IN BLOOD BY AUTOMATED COUNT: 14.2 % (ref 11–15)
FERRITIN SERPL-MCNC: 179 NG/ML (ref 38–380)
GLOBULIN SER CALC-MCNC: 3.1 G/DL (CALC) (ref 1.9–3.7)
GLUCOSE SERPL-MCNC: 124 MG/DL (ref 65–99)
HCT VFR BLD AUTO: 50.6 % (ref 38.5–50)
HDLC SERPL-MCNC: 31 MG/DL
HGB BLD-MCNC: 17.7 G/DL (ref 13.2–17.1)
MCH RBC QN AUTO: 30.2 PG (ref 27–33)
MCHC RBC AUTO-ENTMCNC: 35 G/DL (ref 32–36)
MCV RBC AUTO: 86.2 FL (ref 80–100)
NONHDLC SERPL-MCNC: 199 MG/DL (CALC)
PLATELET # BLD AUTO: 259 THOUSAND/UL (ref 140–400)
PMV BLD REES-ECKER: 11.2 FL (ref 7.5–12.5)
POTASSIUM SERPL-SCNC: 4.6 MMOL/L (ref 3.5–5.3)
PROLACTIN SERPL-MCNC: 7.9 NG/ML (ref 2–18)
PROT SERPL-MCNC: 7.7 G/DL (ref 6.1–8.1)
RBC # BLD AUTO: 5.87 MILLION/UL (ref 4.2–5.8)
REF LAB TEST NAME: NORMAL
REF LAB TEST: NORMAL
SL AMB CLIENT CONTACT: NORMAL
SL AMB EGFR AFRICAN AMERICAN: 92 ML/MIN/1.73M2
SL AMB EGFR NON AFRICAN AMERICAN: 80 ML/MIN/1.73M2
SODIUM SERPL-SCNC: 141 MMOL/L (ref 135–146)
TESTOST SERPL-MCNC: 236 NG/DL (ref 250–1100)
TRIGL SERPL-MCNC: 731 MG/DL
TSH SERPL-ACNC: 2.2 MIU/L (ref 0.4–4.5)
WBC # BLD AUTO: 8.3 THOUSAND/UL (ref 3.8–10.8)

## 2022-04-29 DIAGNOSIS — I10 ESSENTIAL HYPERTENSION: ICD-10-CM

## 2022-04-29 RX ORDER — AMLODIPINE BESYLATE 10 MG/1
TABLET ORAL
Qty: 30 TABLET | Refills: 0 | Status: SHIPPED | OUTPATIENT
Start: 2022-04-29 | End: 2022-05-31

## 2022-05-29 DIAGNOSIS — I10 ESSENTIAL HYPERTENSION: ICD-10-CM

## 2022-05-31 RX ORDER — AMLODIPINE BESYLATE 10 MG/1
TABLET ORAL
Qty: 30 TABLET | Refills: 0 | Status: SHIPPED | OUTPATIENT
Start: 2022-05-31 | End: 2022-06-25

## 2022-06-07 DIAGNOSIS — Z12.11 SCREEN FOR COLON CANCER: Primary | ICD-10-CM

## 2022-06-28 DIAGNOSIS — F41.9 ANXIETY: ICD-10-CM

## 2022-06-28 DIAGNOSIS — I10 ESSENTIAL HYPERTENSION: ICD-10-CM

## 2022-06-28 RX ORDER — AMLODIPINE BESYLATE 10 MG/1
TABLET ORAL
Qty: 30 TABLET | Refills: 0 | Status: SHIPPED | OUTPATIENT
Start: 2022-06-28 | End: 2022-07-28

## 2022-06-28 RX ORDER — FLUOXETINE 10 MG/1
TABLET, FILM COATED ORAL
Qty: 45 TABLET | Refills: 1 | Status: SHIPPED | OUTPATIENT
Start: 2022-06-28

## 2022-07-28 DIAGNOSIS — I10 ESSENTIAL HYPERTENSION: ICD-10-CM

## 2022-07-28 RX ORDER — AMLODIPINE BESYLATE 10 MG/1
TABLET ORAL
Qty: 30 TABLET | Refills: 0 | Status: SHIPPED | OUTPATIENT
Start: 2022-07-28 | End: 2022-08-27

## 2022-08-27 DIAGNOSIS — I10 ESSENTIAL HYPERTENSION: ICD-10-CM

## 2022-08-27 RX ORDER — AMLODIPINE BESYLATE 10 MG/1
TABLET ORAL
Qty: 30 TABLET | Refills: 0 | Status: SHIPPED | OUTPATIENT
Start: 2022-08-27 | End: 2022-09-26

## 2022-10-11 ENCOUNTER — OFFICE VISIT (OUTPATIENT)
Dept: FAMILY MEDICINE CLINIC | Facility: HOSPITAL | Age: 57
End: 2022-10-11

## 2022-10-11 VITALS
OXYGEN SATURATION: 97 % | BODY MASS INDEX: 31.69 KG/M2 | HEART RATE: 68 BPM | HEIGHT: 72 IN | SYSTOLIC BLOOD PRESSURE: 120 MMHG | WEIGHT: 234 LBS | DIASTOLIC BLOOD PRESSURE: 68 MMHG

## 2022-10-11 DIAGNOSIS — Z13.1 SCREENING FOR DIABETES MELLITUS: Primary | ICD-10-CM

## 2022-10-11 DIAGNOSIS — Z11.59 NEED FOR HEPATITIS C SCREENING TEST: ICD-10-CM

## 2022-10-11 DIAGNOSIS — E78.5 DYSLIPIDEMIA: ICD-10-CM

## 2022-10-11 DIAGNOSIS — Z13.220 SCREENING FOR HYPERLIPIDEMIA: ICD-10-CM

## 2022-10-11 DIAGNOSIS — R73.01 IFG (IMPAIRED FASTING GLUCOSE): ICD-10-CM

## 2022-10-11 DIAGNOSIS — Z13.29 SCREENING FOR THYROID DISORDER: ICD-10-CM

## 2022-10-11 DIAGNOSIS — I10 ESSENTIAL HYPERTENSION: ICD-10-CM

## 2022-10-11 DIAGNOSIS — Z13.0 SCREENING FOR IRON DEFICIENCY ANEMIA: ICD-10-CM

## 2022-10-11 DIAGNOSIS — Z11.4 SCREENING FOR HIV (HUMAN IMMUNODEFICIENCY VIRUS): ICD-10-CM

## 2022-10-11 RX ORDER — AMLODIPINE BESYLATE 5 MG/1
5 TABLET ORAL DAILY
Qty: 30 TABLET | Refills: 1 | Status: SHIPPED | OUTPATIENT
Start: 2022-10-11

## 2022-10-11 RX ORDER — BISOPROLOL FUMARATE 10 MG/1
10 TABLET, FILM COATED ORAL DAILY
Qty: 30 TABLET | Refills: 1 | Status: SHIPPED | OUTPATIENT
Start: 2022-10-11

## 2022-10-11 RX ORDER — HYDROCHLOROTHIAZIDE 12.5 MG/1
12.5 TABLET ORAL DAILY
Qty: 30 TABLET | Refills: 1 | Status: SHIPPED | OUTPATIENT
Start: 2022-10-11

## 2022-10-11 NOTE — PROGRESS NOTES
[facility],     Assessment/Plan:      Diagnosis ICD-10-CM Associated Orders   1  Screening for diabetes mellitus  Z13 1    2  Essential hypertension  I10 hydrochlorothiazide (HYDRODIURIL) 12 5 mg tablet     amLODIPine (NORVASC) 5 mg tablet     bisoprolol (ZEBETA) 10 MG tablet   3  Screening for hyperlipidemia  Z13 220    4  Dyslipidemia  E78 5 Lipid panel   5  IFG (impaired fasting glucose)  R73 01 Comprehensive metabolic panel     HEMOGLOBIN A1C W/ EAG ESTIMATION   6  Screening for thyroid disorder  Z13 29 TSH, 3rd generation with Free T4 reflex   7  Screening for iron deficiency anemia  Z13 0 CBC and differential   8  Need for hepatitis C screening test  Z11 59 Hepatitis C antibody   9  Screening for HIV (human immunodeficiency virus)  Z11 4 Human Immunodeficiency Virus 1/2 Antigen / Antibody ( Fourth Generation) with Reflex Testing     • Orders as above  Blood work ordered, meds reviewed and reconciled  Can discuss CSI's as needed  Return in about 4 months (around 2/11/2023) for Annual Physical   • Patient may call or return to office with any questions or concerns  ______________________________________________________________________  Subjective:     Patient ID: Jazmyn Bustamante is a 55 y o  male  HPI  Jazmyn Bustamante  Chief Complaint   Patient presents with   • Establish Care     Norvasc cut in half 3d due to fatigue and ankle swelling, feeling slightly better on that dose  's SBP or better  Has been on combo Ziac for yrs, dose change 3 yrs again  Was on 2 a day now only one  On lisinopril, had issues with losartan  Prozac only prn long or stressful drives  Xanax for insomnia  Seeing Dr Julia Anthony & on ASA 81 mg qd, was on xarelto, didn't feel well  Hx of Ortho Coordinated - [facility] - RHD, R shoulder injections, r tennis elbow injections helped  Also gets CMC arthritis pain  Hx or left inguinal hernia  HIEU with CPAP        The following portions of the patient's history were reviewed and updated as appropriate: allergies, current medications, past medical history, and problem list     Review of Systems   Constitutional: Negative for chills, fatigue and fever  Respiratory: Negative for cough and shortness of breath  Cardiovascular: Negative for chest pain and palpitations  Gastrointestinal: Positive for diarrhea (IBS like symptoms at times)  Negative for nausea  Genitourinary: Negative for enuresis and hematuria  Neurological: Negative for dizziness, light-headedness and headaches  Objective:      Vitals:    10/11/22 1457   BP: 120/68   Pulse: 68   SpO2: 97%      Physical Exam  Vitals reviewed  Constitutional:       General: He is not in acute distress  Appearance: Normal appearance  He is well-developed  He is not ill-appearing  HENT:      Head: Normocephalic and atraumatic  Eyes:      General: No scleral icterus  Right eye: No discharge  Left eye: No discharge  Cardiovascular:      Rate and Rhythm: Normal rate and regular rhythm  Pulses: Normal pulses  Heart sounds: Normal heart sounds  No murmur heard  No friction rub  Pulmonary:      Effort: Pulmonary effort is normal  No respiratory distress  Breath sounds: Normal breath sounds  No stridor  No wheezing  Musculoskeletal:         General: Tenderness (Diffuse right shoulder, bilateral CMC joints) present  Cervical back: Normal range of motion  No rigidity  Right lower leg: No edema  Left lower leg: No edema  Skin:     General: Skin is warm and dry  Neurological:      Mental Status: He is alert and oriented to person, place, and time  Gait: Gait normal    Psychiatric:         Mood and Affect: Mood normal          Behavior: Behavior normal          Thought Content: Thought content normal          Judgment: Judgment normal            Portions of the record may have been created with voice recognition software   Occasional wrong word or "sound alike" substitutions may have occurred due to the inherent limitations of voice recognition software  Please review the chart carefully and recognize, using context, where substitutions/typographical errors may have occurred

## 2022-10-14 DIAGNOSIS — F41.9 ANXIETY: ICD-10-CM

## 2022-10-17 DIAGNOSIS — K21.00 ESOPHAGITIS, REFLUX: ICD-10-CM

## 2022-10-17 RX ORDER — ALPRAZOLAM 0.25 MG/1
TABLET ORAL
Qty: 30 TABLET | Refills: 0 | Status: SHIPPED | OUTPATIENT
Start: 2022-10-17

## 2022-10-17 RX ORDER — PANTOPRAZOLE SODIUM 40 MG/1
TABLET, DELAYED RELEASE ORAL
Qty: 90 TABLET | Refills: 1 | Status: SHIPPED | OUTPATIENT
Start: 2022-10-17

## 2022-10-26 DIAGNOSIS — I10 BENIGN ESSENTIAL HYPERTENSION: ICD-10-CM

## 2022-10-28 RX ORDER — BISOPROLOL FUMARATE AND HYDROCHLOROTHIAZIDE 10; 6.25 MG/1; MG/1
TABLET ORAL
Qty: 180 TABLET | Refills: 1 | Status: SHIPPED | OUTPATIENT
Start: 2022-10-28

## 2022-11-01 NOTE — ASSESSMENT & PLAN NOTE
----- Message from Debra Mckeon MD sent at 10/28/2022  5:07 PM EDT -----  Please let Mr. Bran Sánchez know that the lymph nodes in his chest have decreased in size. Debra Mckeon MD        Called pt made him aware of his CT results. Pt voiced understanding. Blood pressure suboptimally controlled today  He has not had his Ziac today  He does not take it every day  We stressed the importance of compliance with his medication  He is asked to get CBC CMP and lipids as well

## 2022-11-02 ENCOUNTER — OFFICE VISIT (OUTPATIENT)
Dept: FAMILY MEDICINE CLINIC | Facility: HOSPITAL | Age: 57
End: 2022-11-02

## 2022-11-02 VITALS
WEIGHT: 238 LBS | OXYGEN SATURATION: 94 % | DIASTOLIC BLOOD PRESSURE: 78 MMHG | BODY MASS INDEX: 32.23 KG/M2 | HEART RATE: 66 BPM | HEIGHT: 72 IN | SYSTOLIC BLOOD PRESSURE: 126 MMHG

## 2022-11-02 DIAGNOSIS — G89.29 CHRONIC PAIN OF RIGHT THUMB: ICD-10-CM

## 2022-11-02 DIAGNOSIS — M79.644 CHRONIC PAIN OF RIGHT THUMB: ICD-10-CM

## 2022-11-02 DIAGNOSIS — M25.511 CHRONIC RIGHT SHOULDER PAIN: Primary | ICD-10-CM

## 2022-11-02 DIAGNOSIS — G89.29 CHRONIC RIGHT SHOULDER PAIN: Primary | ICD-10-CM

## 2022-11-02 NOTE — PROGRESS NOTES
[address] [facility],     Assessment/Plan:      Diagnosis ICD-10-CM Associated Orders   1  Chronic right shoulder pain  M25 511 Large joint arthrocentesis    G89 29    2  Chronic pain of right thumb  M79 644 Small joint arthrocentesis    G89 29      • Corticosteroid injections provided today of both his right thumb CMC and right subacromial bursa  Patient tolerated them well, side effects, and alternative options discussed  Patient requested steroid injection, these have helped in the past   Return as needed for follow-up or repeat injections  No sooner than 3 months in the same joint  No more than 2 injections on the same visit  • Patient may call or return to office with any questions or concerns  ______________________________________________________________________  Subjective:     Patient ID: Vanessa Brasher is a 55 y o  male  HPI  Vanessa Brasher  Chief Complaint   Patient presents with   • Pain     In shoulders and thumbs      Longstanding history of seeing Dr Canseco and an additional orthopedic at [facility]  In the past he is had right shoulder injections right tennis elbow injections, and he is right-hand dominant  He also reports getting bilateral thumb pain  Knows that he needs a right shoulder surgery in the future, but the steroid injections have helped  The following portions of the patient's history were reviewed and updated as appropriate: allergies, current medications, past medical history, and problem list     Review of Systems      Objective:      Vitals:    11/02/22 1533   BP: 126/78   Pulse: 66   SpO2: 94%      Physical Exam  Vitals reviewed  Constitutional:       Appearance: He is well-developed  HENT:      Head: Normocephalic and atraumatic  Eyes:      General: No scleral icterus  Right eye: No discharge  Left eye: No discharge  Cardiovascular:      Rate and Rhythm: Normal rate     Pulmonary:      Effort: Pulmonary effort is normal  No respiratory distress  Breath sounds: No stridor  Musculoskeletal:         General: Tenderness present  Cervical back: Normal range of motion  Skin:     General: Skin is warm and dry  Neurological:      Mental Status: He is alert and oriented to person, place, and time  Gait: Gait normal    Psychiatric:         Mood and Affect: Mood normal          Behavior: Behavior normal          Thought Content: Thought content normal          Judgment: Judgment normal        RIGHT SHOULDER:  Erythema: no  Swelling: no  Increased Warmth: no    Tenderness:  Rotator cuff insertion, both biceps' tendon origins    ROM  Touchdown sign: intact  External Rotation: Intact  Internal Rotation: Intact    Strength  Abduction: 5/5  ER: 5/5  IR: 5/5    Drop-Arm: negative  Emptycan: + pain    Juares:+  Neer: negative    Right elbow positive for pain over the mobile wad, to palpation but no pain with wrist flexion or finger flexion testing  More concerning for bursitis then tendinitis  Bilateral CMC joints tender to palpation and axial grind testing  Negative bilateral Finkelstein's testing      Small joint arthrocentesis: R thumb CMC  [place] Protocol:  Consent: Verbal consent obtained  Risks and benefits: risks, benefits and alternatives were discussed  Consent given by: patient  Time out: Immediately prior to procedure a "time out" was called to verify the correct patient, procedure, equipment, support staff and site/side marked as required    Patient understanding: patient states understanding of the procedure being performed  Patient consent: the patient's understanding of the procedure matches consent given  Procedure consent: procedure consent matches procedure scheduled  Site marked: the operative site was marked  Patient identity confirmed: verbally with patient    Supporting Documentation  Indications: pain   Procedure Details  Location: thumb - R thumb CMC  Preparation: Patient was prepped and draped in the usual sterile fashion  Needle size: 25 G  Ultrasound guidance: no  Approach: dorsal  Medications administered: 0 5 mL lidocaine 1 %; 0 5 mL methylPREDNISolone acetate 80 mg/mL    Patient tolerance: patient tolerated the procedure well with no immediate complications  Dressing:  Sterile dressing applied    Large joint arthrocentesis: R subacromial bursa  Universal Protocol:  Consent: Verbal consent obtained  Risks and benefits: risks, benefits and alternatives were discussed  Consent given by: patient  Time out: Immediately prior to procedure a "time out" was called to verify the correct patient, procedure, equipment, support staff and site/side marked as required  Patient identity confirmed: verbally with patient    Supporting Documentation  Indications: pain   Procedure Details  Location: shoulder - R subacromial bursa  Preparation: Patient was prepped and draped in the usual sterile fashion  Needle size: 22 G  Ultrasound guidance: no  Approach: lateral  Medications administered: 4 mL lidocaine 1 %; 40 mg triamcinolone acetonide 40 mg/mL    Patient tolerance: patient tolerated the procedure well with no immediate complications  Dressing:  Sterile dressing applied            Portions of the record may have been created with voice recognition software  Occasional wrong word or "sound alike" substitutions may have occurred due to the inherent limitations of voice recognition software  Please review the chart carefully and recognize, using context, where substitutions/typographical errors may have occurred

## 2022-11-04 ENCOUNTER — TELEPHONE (OUTPATIENT)
Dept: FAMILY MEDICINE CLINIC | Facility: HOSPITAL | Age: 57
End: 2022-11-04

## 2022-11-04 RX ORDER — LIDOCAINE HYDROCHLORIDE 10 MG/ML
0.5 INJECTION, SOLUTION INFILTRATION; PERINEURAL
Status: COMPLETED | OUTPATIENT
Start: 2022-11-04 | End: 2022-11-04

## 2022-11-04 RX ORDER — TRIAMCINOLONE ACETONIDE 40 MG/ML
40 INJECTION, SUSPENSION INTRA-ARTICULAR; INTRAMUSCULAR
Status: COMPLETED | OUTPATIENT
Start: 2022-11-04 | End: 2022-11-04

## 2022-11-04 RX ORDER — LIDOCAINE HYDROCHLORIDE 10 MG/ML
4 INJECTION, SOLUTION INFILTRATION; PERINEURAL
Status: COMPLETED | OUTPATIENT
Start: 2022-11-04 | End: 2022-11-04

## 2022-11-04 RX ORDER — METHYLPREDNISOLONE ACETATE 80 MG/ML
0.5 INJECTION, SUSPENSION INTRA-ARTICULAR; INTRALESIONAL; INTRAMUSCULAR; SOFT TISSUE
Status: COMPLETED | OUTPATIENT
Start: 2022-11-04 | End: 2022-11-04

## 2022-11-04 RX ADMIN — METHYLPREDNISOLONE ACETATE 0.5 ML: 80 INJECTION, SUSPENSION INTRA-ARTICULAR; INTRALESIONAL; INTRAMUSCULAR; SOFT TISSUE at 14:54

## 2022-11-04 RX ADMIN — TRIAMCINOLONE ACETONIDE 40 MG: 40 INJECTION, SUSPENSION INTRA-ARTICULAR; INTRAMUSCULAR at 14:54

## 2022-11-04 RX ADMIN — LIDOCAINE HYDROCHLORIDE 0.5 ML: 10 INJECTION, SOLUTION INFILTRATION; PERINEURAL at 14:54

## 2022-11-04 RX ADMIN — LIDOCAINE HYDROCHLORIDE 4 ML: 10 INJECTION, SOLUTION INFILTRATION; PERINEURAL at 14:54
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
## 2022-12-27 DIAGNOSIS — F41.9 ANXIETY: ICD-10-CM

## 2022-12-27 RX ORDER — FLUOXETINE 10 MG/1
5 TABLET, FILM COATED ORAL DAILY
Qty: 45 TABLET | Refills: 1 | Status: SHIPPED | OUTPATIENT
Start: 2022-12-27

## 2023-01-19 ENCOUNTER — TELEPHONE (OUTPATIENT)
Dept: ADMINISTRATIVE | Facility: OTHER | Age: 58
End: 2023-01-19

## 2023-01-19 NOTE — TELEPHONE ENCOUNTER
01/19/23 2:33 PM    The patient was called and a message could not be left on the phone number on file/ phone number on file is incorrect  Thank you    Nabeel Quintana  [facility]

## 2023-03-21 DIAGNOSIS — I10 ESSENTIAL HYPERTENSION: ICD-10-CM

## 2023-03-28 DIAGNOSIS — I10 BENIGN ESSENTIAL HYPERTENSION: ICD-10-CM

## 2023-03-28 DIAGNOSIS — I10 ESSENTIAL HYPERTENSION: ICD-10-CM

## 2023-03-28 RX ORDER — AMLODIPINE BESYLATE 5 MG/1
5 TABLET ORAL DAILY
Qty: 90 TABLET | Refills: 1 | Status: SHIPPED | OUTPATIENT
Start: 2023-03-28

## 2023-04-24 DIAGNOSIS — K21.00 ESOPHAGITIS, REFLUX: ICD-10-CM

## 2023-04-24 DIAGNOSIS — I10 BENIGN ESSENTIAL HYPERTENSION: ICD-10-CM

## 2023-04-25 RX ORDER — PANTOPRAZOLE SODIUM 40 MG/1
40 TABLET, DELAYED RELEASE ORAL DAILY
Qty: 90 TABLET | Refills: 1 | Status: SHIPPED | OUTPATIENT
Start: 2023-04-25

## 2023-04-25 RX ORDER — BISOPROLOL FUMARATE AND HYDROCHLOROTHIAZIDE 10; 6.25 MG/1; MG/1
1 TABLET ORAL 2 TIMES DAILY
Qty: 180 TABLET | Refills: 1 | Status: SHIPPED | OUTPATIENT
Start: 2023-04-25

## 2023-06-03 LAB
ALBUMIN SERPL-MCNC: 4.6 G/DL (ref 3.6–5.1)
ALBUMIN/GLOB SERPL: 1.7 (CALC) (ref 1–2.5)
ALP SERPL-CCNC: 78 U/L (ref 35–144)
ALT SERPL-CCNC: 71 U/L (ref 9–46)
AST SERPL-CCNC: 47 U/L (ref 10–35)
BASOPHILS # BLD AUTO: 70 CELLS/UL (ref 0–200)
BASOPHILS NFR BLD AUTO: 0.8 %
BILIRUB SERPL-MCNC: 0.5 MG/DL (ref 0.2–1.2)
BUN SERPL-MCNC: 18 MG/DL (ref 7–25)
BUN/CREAT SERPL: ABNORMAL (CALC) (ref 6–22)
CALCIUM SERPL-MCNC: 10.1 MG/DL (ref 8.6–10.3)
CHLORIDE SERPL-SCNC: 102 MMOL/L (ref 98–110)
CHOLEST SERPL-MCNC: 232 MG/DL
CHOLEST/HDLC SERPL: 7 (CALC)
CO2 SERPL-SCNC: 28 MMOL/L (ref 20–32)
CREAT SERPL-MCNC: 1.2 MG/DL (ref 0.7–1.3)
EOSINOPHIL # BLD AUTO: 261 CELLS/UL (ref 15–500)
EOSINOPHIL NFR BLD AUTO: 3 %
ERYTHROCYTE [DISTWIDTH] IN BLOOD BY AUTOMATED COUNT: 13.9 % (ref 11–15)
EST. AVERAGE GLUCOSE BLD GHB EST-MCNC: 128 MG/DL
EST. AVERAGE GLUCOSE BLD GHB EST-SCNC: 7.1 MMOL/L
GFR/BSA.PRED SERPLBLD CYS-BASED-ARV: 71 ML/MIN/1.73M2
GLOBULIN SER CALC-MCNC: 2.7 G/DL (CALC) (ref 1.9–3.7)
GLUCOSE SERPL-MCNC: 132 MG/DL (ref 65–99)
HBA1C MFR BLD: 6.1 % OF TOTAL HGB
HCT VFR BLD AUTO: 51.2 % (ref 38.5–50)
HCV AB S/CO SERPL IA: 0.08
HCV AB SERPL QL IA: NORMAL
HDLC SERPL-MCNC: 33 MG/DL
HGB BLD-MCNC: 17.4 G/DL (ref 13.2–17.1)
HIV 1+2 AB+HIV1 P24 AG SERPL QL IA: NORMAL
LYMPHOCYTES # BLD AUTO: 2819 CELLS/UL (ref 850–3900)
LYMPHOCYTES NFR BLD AUTO: 32.4 %
MCH RBC QN AUTO: 30.5 PG (ref 27–33)
MCHC RBC AUTO-ENTMCNC: 34 G/DL (ref 32–36)
MCV RBC AUTO: 89.7 FL (ref 80–100)
MONOCYTES # BLD AUTO: 940 CELLS/UL (ref 200–950)
MONOCYTES NFR BLD AUTO: 10.8 %
NEUTROPHILS # BLD AUTO: 4611 CELLS/UL (ref 1500–7800)
NEUTROPHILS NFR BLD AUTO: 53 %
NONHDLC SERPL-MCNC: 199 MG/DL (CALC)
PLATELET # BLD AUTO: 275 THOUSAND/UL (ref 140–400)
PMV BLD REES-ECKER: 10.9 FL (ref 7.5–12.5)
POTASSIUM SERPL-SCNC: 4.2 MMOL/L (ref 3.5–5.3)
PROT SERPL-MCNC: 7.3 G/DL (ref 6.1–8.1)
RBC # BLD AUTO: 5.71 MILLION/UL (ref 4.2–5.8)
SODIUM SERPL-SCNC: 140 MMOL/L (ref 135–146)
TRIGL SERPL-MCNC: 480 MG/DL
TSH SERPL-ACNC: 2.54 MIU/L (ref 0.4–4.5)
WBC # BLD AUTO: 8.7 THOUSAND/UL (ref 3.8–10.8)

## 2023-06-15 ENCOUNTER — OFFICE VISIT (OUTPATIENT)
Dept: CARDIOLOGY CLINIC | Facility: CLINIC | Age: 58
End: 2023-06-15
Payer: COMMERCIAL

## 2023-06-15 ENCOUNTER — TELEPHONE (OUTPATIENT)
Dept: HEMATOLOGY ONCOLOGY | Facility: CLINIC | Age: 58
End: 2023-06-15

## 2023-06-15 VITALS
HEART RATE: 65 BPM | SYSTOLIC BLOOD PRESSURE: 124 MMHG | WEIGHT: 228.2 LBS | BODY MASS INDEX: 30.91 KG/M2 | HEIGHT: 72 IN | DIASTOLIC BLOOD PRESSURE: 80 MMHG

## 2023-06-15 DIAGNOSIS — D75.1 POLYCYTHEMIA: ICD-10-CM

## 2023-06-15 DIAGNOSIS — I48.0 PAROXYSMAL ATRIAL FIBRILLATION (HCC): ICD-10-CM

## 2023-06-15 DIAGNOSIS — E78.5 DYSLIPIDEMIA: ICD-10-CM

## 2023-06-15 DIAGNOSIS — I49.3 PVC'S (PREMATURE VENTRICULAR CONTRACTIONS): ICD-10-CM

## 2023-06-15 DIAGNOSIS — I10 BENIGN ESSENTIAL HYPERTENSION: Primary | ICD-10-CM

## 2023-06-15 DIAGNOSIS — G47.33 OBSTRUCTIVE SLEEP APNEA: ICD-10-CM

## 2023-06-15 PROCEDURE — 93000 ELECTROCARDIOGRAM COMPLETE: CPT | Performed by: INTERNAL MEDICINE

## 2023-06-15 PROCEDURE — 99214 OFFICE O/P EST MOD 30 MIN: CPT | Performed by: INTERNAL MEDICINE

## 2023-06-15 RX ORDER — OMEGA-3-ACID ETHYL ESTERS 1 G/1
2 CAPSULE, LIQUID FILLED ORAL 2 TIMES DAILY
Qty: 360 CAPSULE | Refills: 3 | Status: SHIPPED | OUTPATIENT
Start: 2023-06-15

## 2023-06-15 NOTE — PROGRESS NOTES
Cardiology Follow Up    Troy Granados  1965  [id number]  [address] [address] [phone]    1  Benign essential hypertension  POCT ECG      2  Polycythemia  Ambulatory Referral to Hematology / Oncology      3  Obstructive sleep apnea        4  PVC's (premature ventricular contractions)        5  Paroxysmal atrial fibrillation (HCC)        6  Dyslipidemia            Interval History: No complaints  Denies chest pain shortness of breath orthopnea paroxysmal nocturnal dyspnea or syncope  Patient Active Problem List   Diagnosis   • Anxiety   • Obstructive sleep apnea   • Benign essential hypertension   • Depression   • Direct inguinal hernia   • Dyslipidemia   • Esophagitis, reflux   • Premature ejaculation   • PVC's (premature ventricular contractions)   • Paroxysmal atrial fibrillation (HCC)   • Drug-induced erectile dysfunction   • Gynecomastia, male     Past Medical History:   Diagnosis Date   • Extrinsic asthma 8/15/2008   • Exudative pharyngitis 5/20/2020   • GERD (gastroesophageal reflux disease)    • Hypertension      Social History     Socioeconomic History   • Marital status: /Civil Union     Spouse name: Not on file   • Number of children: Not on file   • Years of education: Not on file   • Highest education level: Not on file   Occupational History   • Not on file   Tobacco Use   • Smoking status: Never   • Smokeless tobacco: Never   Vaping Use   • Vaping Use: Never used   Substance and Sexual Activity   • Alcohol use:  Yes     Alcohol/week: 1 0 standard drink of alcohol     Types: 1 Cans of beer per week     Comment: twice weekly   • Drug use: No   • Sexual activity: Not on file   Other Topics Concern   • Not on file   Social History Narrative   • Not on file     Social Determinants of Health     Financial Resource Strain: Not on file   Food Insecurity: Not on file Transportation Needs: Not on file   Physical Activity: Not on file   Stress: Not on file   Social Connections: Not on file   Intimate Partner Violence: Not on file   Housing Stability: Not on file      Family History   Problem Relation Age of Onset   • Depression Son      Past Surgical History:   Procedure Laterality Date   • COLONOSCOPY     • FACIAL/NECK BIOPSY N/A 8/14/2018    Procedure: EXCISION BIOPSY LESION POSTERIOR NECK;  Surgeon: Rayshawn Peralta MD;  Location: [facility] OR;  Service: General       Current Outpatient Medications:   •  ALPRAZolam (XANAX) 0 25 mg tablet, TAKE ONE TABLET BY MOUTH AT BEDTIME AS NEEDED FOR ANXIETY, Disp: 30 tablet, Rfl: 0  •  amLODIPine (NORVASC) 5 mg tablet, Take 1 tablet (5 mg total) by mouth daily, Disp: 90 tablet, Rfl: 1  •  aspirin (ECOTRIN LOW STRENGTH) 81 mg EC tablet, Take 81 mg by mouth daily , Disp: , Rfl:   •  bisoprolol-hydrochlorothiazide (ZIAC) 10-6 25 MG per tablet, Take 1 tablet by mouth 2 (two) times a day, Disp: 180 tablet, Rfl: 1  •  Cholecalciferol (Vitamin D3) 250 MCG (66780 UT) TABS, Take by mouth, Disp: , Rfl:   •  pantoprazole (PROTONIX) 40 mg tablet, Take 1 tablet (40 mg total) by mouth daily, Disp: 90 tablet, Rfl: 1  •  sildenafil (VIAGRA) 100 mg tablet, Take 1 tablet (100 mg total) by mouth daily as needed for erectile dysfunction, Disp: 10 tablet, Rfl: 0  •  bisoprolol (ZEBETA) 10 MG tablet, Take 1 tablet (10 mg total) by mouth daily (Patient not taking: Reported on 6/15/2023), Disp: 30 tablet, Rfl: 1  •  FLUoxetine (PROzac) 10 MG tablet, Take 0 5 tablets (5 mg total) by mouth daily (Patient not taking: Reported on 6/15/2023), Disp: 45 tablet, Rfl: 1  •  hydrochlorothiazide (HYDRODIURIL) 12 5 mg tablet, Take 1 tablet (12 5 mg total) by mouth daily (Patient not taking: Reported on 6/15/2023), Disp: 30 tablet, Rfl: 1  •  Lactobacillus (ACIDOPHILUS PO), Take by mouth  (Patient not taking: Reported on 6/15/2023), Disp: , Rfl:   Allergies   Allergen Reactions   • Bactrim [Sulfamethoxazole-Trimethoprim] Diarrhea     vomiting   • Pravastatin Palpitations       Labs:  Orders Only on 06/02/2023   Component Date Value   • Total Cholesterol 06/02/2023 232 (H)    • HDL 06/02/2023 33 (L)    • Triglycerides 06/02/2023 480 (H)    • LDL Calculated 06/02/2023     • Chol HDLC Ratio 06/02/2023 7 0 (H)    • Non-HDL Cholesterol 06/02/2023 199 (H)    • HIV AG/AB, 4th Gen 06/02/2023 NON-REACTIVE    • Glucose, Random 06/02/2023 132 (H)    • BUN 06/02/2023 18    • Creatinine 06/02/2023 1 20    • eGFR 06/02/2023 71    • SL AMB BUN/CREATININE RA* 07/27/1717 NOT APPLICABLE    • Sodium 30/50/6273 140    • Potassium 06/02/2023 4 2    • Chloride 06/02/2023 102    • CO2 06/02/2023 28    • Calcium 06/02/2023 10 1    • Protein, Total 06/02/2023 7 3    • Albumin 06/02/2023 4 6    • Globulin 06/02/2023 2 7    • Albumin/Globulin Ratio 06/02/2023 1 7    • TOTAL BILIRUBIN 06/02/2023 0 5    • Alkaline Phosphatase 06/02/2023 78    • AST 06/02/2023 47 (H)    • ALT 06/02/2023 71 (H)    • White Blood Cell Count 06/02/2023 8 7    • Red Blood Cell Count 06/02/2023 5 71    • Hemoglobin 06/02/2023 17 4 (H)    • HCT 06/02/2023 51 2 (H)    • MCV 06/02/2023 89 7    • MCH 06/02/2023 30 5    • MCHC 06/02/2023 34 0    • RDW 06/02/2023 13 9    • Platelet Count 35/63/9914 275    • SL AMB MPV 06/02/2023 10 9    • Neutrophils (Absolute) 06/02/2023 4,611    • Lymphocytes (Absolute) 06/02/2023 2,819    • Monocytes (Absolute) 06/02/2023 940    • Eosinophils (Absolute) 06/02/2023 261    • Basophils ABS 06/02/2023 70    • Neutrophils 06/02/2023 53    • Lymphocytes 06/02/2023 32 4    • Monocytes 06/02/2023 10 8    • Eosinophils 06/02/2023 3 0    • Basophils PCT 06/02/2023 0 8    • HEP C AB 06/02/2023 NON-REACTIVE    • Signal to Cut-Off 06/02/2023 0 08    • TSH W/RFX TO FREE T4 06/02/2023 2 54    • Hemoglobin A1C 06/02/2023 6 1 (H)    • Estimated Average Glucose 06/02/2023 128    • Estimated Average Glucos* 06/02/2023 7 1      Imaging: No results found  Review of Systems:  Review of Systems   Constitutional: Negative for chills and fever  HENT: Negative for ear pain and sore throat  Eyes: Negative for pain and visual disturbance  Respiratory: Negative for cough and shortness of breath  Cardiovascular: Negative for chest pain and palpitations  Gastrointestinal: Negative for abdominal pain and vomiting  Genitourinary: Negative for dysuria and hematuria  Musculoskeletal: Negative for arthralgias and back pain  Skin: Negative for color change and rash  Neurological: Negative for seizures and syncope  All other systems reviewed and are negative  Physical Exam:  Physical Exam  Vitals and nursing note reviewed  Constitutional:       Appearance: Normal appearance  HENT:      Head: Normocephalic and atraumatic  Nose: Nose normal       Mouth/Throat:      Mouth: Mucous membranes are moist    Eyes:      Conjunctiva/sclera: Conjunctivae normal    Cardiovascular:      Rate and Rhythm: Normal rate and regular rhythm  Pulmonary:      Effort: Pulmonary effort is normal       Breath sounds: Normal breath sounds  Abdominal:      Palpations: Abdomen is soft  Musculoskeletal:         General: Normal range of motion  Cervical back: Normal range of motion  Skin:     General: Skin is warm and dry  Neurological:      General: No focal deficit present  Mental Status: He is alert and oriented to person, place, and time  Psychiatric:         Mood and Affect: Mood normal          Discussion/Summary: No cardiovascular symptoms  Physical exam unremarkable  Twelve-lead EKG shows sinus bradycardia with a single PVC  Voltage criteria for LVH  Blood pressure is well controlled on his current medical regimen  On his most recent lab work hypertriglyceridemia was noted  We discussed statins which she states he has been on in the past and did not tolerate    He is not interested in a statin at this time   As his triglycerides are  are in the 500 range I will prescribe Lovaza  2 g twice daily  Recheck a fasting lipid profile in 3 months  No further paroxysmal atrial fibrillation  Single PVC is noted on EKG asymptomatic  Potassium is 4 2  Still continues with mild mild polycythemia etiology unknown may be related to obstructive sleep apnea  I recommended he see a hematologist for an opinion  A consultation has been placed  I will see him again in 1 year

## 2023-06-15 NOTE — TELEPHONE ENCOUNTER
I called [facility] in response to a referral that was received for patient to establish care with Hematology  Outreach was made to schedule a consultation       I left a voicemail explaining the reason for my call and advised patient to call [facility] at [phone]  Another attempt will be made to contact patient

## 2023-06-16 ENCOUNTER — TELEPHONE (OUTPATIENT)
Dept: HEMATOLOGY ONCOLOGY | Facility: CLINIC | Age: 58
End: 2023-06-16

## 2023-06-16 NOTE — TELEPHONE ENCOUNTER
I called [facility]ak[facility] in response to a referral that was received for patient to establish care with Hematology  Outreach was made to schedule a consultation       I left a voicemail explaining the reason for my call and advised patient to call [facility] at [phone]  Another attempt will be made to contact patient

## 2023-06-19 ENCOUNTER — TELEPHONE (OUTPATIENT)
Dept: HEMATOLOGY ONCOLOGY | Facility: CLINIC | Age: 58
End: 2023-06-19

## 2023-06-19 RX ORDER — AMLODIPINE BESYLATE 10 MG/1
TABLET ORAL
Qty: 30 TABLET | Refills: 0 | OUTPATIENT
Start: 2023-06-19

## 2023-06-19 NOTE — TELEPHONE ENCOUNTER
I called Ciro Dancer in response to a referral that was received for patient to establish care with Hematology  Outreach was made to schedule a consultation       I left a voicemail explaining the reason for my call and advised patient to call [facility] at [phone]  This is the third attempt to schedule patient unsuccessfully  The referral has been closed, a [employer] message has been sent to patient (if applicable) and a letter has been sent to the address on file

## 2023-07-23 DIAGNOSIS — K21.00 ESOPHAGITIS, REFLUX: ICD-10-CM

## 2023-07-23 DIAGNOSIS — I10 BENIGN ESSENTIAL HYPERTENSION: ICD-10-CM

## 2023-09-21 DIAGNOSIS — I10 ESSENTIAL HYPERTENSION: ICD-10-CM

## 2023-09-21 RX ORDER — AMLODIPINE BESYLATE 5 MG/1
5 TABLET ORAL DAILY
Qty: 90 TABLET | Refills: 1 | Status: SHIPPED | OUTPATIENT
Start: 2023-09-21

## 2023-10-21 RX ORDER — BISOPROLOL FUMARATE AND HYDROCHLOROTHIAZIDE 10; 6.25 MG/1; MG/1
1 TABLET ORAL 2 TIMES DAILY
Qty: 180 TABLET | Refills: 0 | OUTPATIENT
Start: 2023-10-21

## 2023-10-21 RX ORDER — PANTOPRAZOLE SODIUM 40 MG/1
40 TABLET, DELAYED RELEASE ORAL DAILY
Qty: 90 TABLET | Refills: 0 | OUTPATIENT
Start: 2023-10-21

## 2024-01-16 ENCOUNTER — OFFICE VISIT (OUTPATIENT)
Dept: FAMILY MEDICINE CLINIC | Facility: HOSPITAL | Age: 59
End: 2024-01-16

## 2024-01-16 VITALS
DIASTOLIC BLOOD PRESSURE: 82 MMHG | WEIGHT: 236 LBS | OXYGEN SATURATION: 94 % | HEIGHT: 72 IN | SYSTOLIC BLOOD PRESSURE: 130 MMHG | BODY MASS INDEX: 31.97 KG/M2 | HEART RATE: 78 BPM

## 2024-01-16 DIAGNOSIS — I10 BENIGN ESSENTIAL HYPERTENSION: ICD-10-CM

## 2024-01-16 DIAGNOSIS — Z13.0 SCREENING FOR IRON DEFICIENCY ANEMIA: ICD-10-CM

## 2024-01-16 DIAGNOSIS — R21 GROIN RASH: ICD-10-CM

## 2024-01-16 DIAGNOSIS — E78.5 DYSLIPIDEMIA: ICD-10-CM

## 2024-01-16 DIAGNOSIS — F41.9 ANXIETY: ICD-10-CM

## 2024-01-16 DIAGNOSIS — Z12.11 SCREEN FOR COLON CANCER: ICD-10-CM

## 2024-01-16 DIAGNOSIS — I10 ESSENTIAL HYPERTENSION: ICD-10-CM

## 2024-01-16 DIAGNOSIS — M79.645 CHRONIC PAIN OF LEFT THUMB: Primary | ICD-10-CM

## 2024-01-16 DIAGNOSIS — G89.29 CHRONIC PAIN OF LEFT THUMB: Primary | ICD-10-CM

## 2024-01-16 DIAGNOSIS — R73.01 IFG (IMPAIRED FASTING GLUCOSE): ICD-10-CM

## 2024-01-16 DIAGNOSIS — Z12.5 SCREENING FOR PROSTATE CANCER: ICD-10-CM

## 2024-01-16 RX ORDER — KETOCONAZOLE 20 MG/G
CREAM TOPICAL DAILY
Qty: 60 G | Refills: 1 | Status: SHIPPED | OUTPATIENT
Start: 2024-01-16

## 2024-01-16 RX ORDER — BISOPROLOL FUMARATE AND HYDROCHLOROTHIAZIDE 10; 6.25 MG/1; MG/1
2 TABLET ORAL DAILY
Qty: 180 TABLET | Refills: 2 | Status: SHIPPED | OUTPATIENT
Start: 2024-01-16

## 2024-01-16 RX ORDER — ALPRAZOLAM 0.25 MG/1
0.25 TABLET ORAL
Qty: 30 TABLET | Refills: 0 | Status: SHIPPED | OUTPATIENT
Start: 2024-01-16

## 2024-01-16 RX ORDER — AMLODIPINE BESYLATE 10 MG/1
10 TABLET ORAL DAILY
Qty: 90 TABLET | Refills: 2 | Status: SHIPPED | OUTPATIENT
Start: 2024-01-16

## 2024-01-16 RX ORDER — HYDROCHLOROTHIAZIDE 12.5 MG/1
12.5 TABLET ORAL DAILY PRN
Qty: 30 TABLET | Refills: 1 | Status: SHIPPED | OUTPATIENT
Start: 2024-01-16

## 2024-01-16 NOTE — PROGRESS NOTES
ADULT ANNUAL PHYSICAL  [facility] PRIMARY CARE [address]    NAME: Otis Robledo  AGE: 58 y.o. SEX: male  : 1965      Assessment and Plan:      Diagnosis ICD-10-CM Associated Orders   1. Chronic pain of left thumb  M79.645 XR hand 3+ vw left    G89.29 Ambulatory Referral to Hand Surgery      2. Essential hypertension  I10 amLODIPine (NORVASC) 10 mg tablet     hydrochlorothiazide (HYDRODIURIL) 12.5 mg tablet      3. Benign essential hypertension  I10 bisoprolol-hydrochlorothiazide (ZIAC) 10-6.25 MG per tablet      4. Anxiety  F41.9 ALPRAZolam (XANAX) 0.25 mg tablet      5. Groin rash  R21 ketoconazole (NIZORAL) 2 % cream      6. IFG (impaired fasting glucose)  R73.01 Comprehensive metabolic panel     Hemoglobin A1C      7. Dyslipidemia  E78.5 Lipid Panel with Direct LDL reflex      8. Screening for prostate cancer  Z12.5 PSA, Total Screen      9. Screening for iron deficiency anemia  Z13.0 CBC and Platelet      10. Screen for colon cancer  Z12.11 Ambulatory Referral to Gastroenterology        Above lab work testing ordered, will reach out with results.  Due for colonoscopy, and referral placed for him.    Slight rash on upper gluteal folds crease, appears mildly erythematous and likely would improve from the ketoconazole cream.  Ordered for him.    Medications reviewed and reconciled, refilled as above.    Ongoing left hand/thumb pain, referral given for Ortho hand surgeon, and an x-ray to establish severity of likely arthritis.    Information given to him after discussion as well about his long-term right shoulder issues, and in the past he has been seen through [facility].  He is interested in getting a second opinion and being seen by Dr. Mojgan Montalvo's info provided to him.     Immunizations and preventive care screenings were discussed with patient today. Appropriate education was printed on patient's after visit summary.    Discussed risks and  benefits of prostate cancer screening. We discussed the controversial history of PSA screening for prostate cancer in the [place] as well as the risk of over detection and over treatment of prostate cancer by way of PSA screening.  The patient understands that PSA blood testing is an imperfect way to screen for prostate cancer and that elevated PSA levels in the blood may also be caused by infection, inflammation, prostatic trauma or manipulation, urological procedures, or by benign prostatic enlargement.    The role of the digital rectal examination in prostate cancer screening was also discussed and I discussed with him that there is large interobserver variability in the findings of digital rectal examination.    Counseling:  Alcohol/drug use: discussed moderation in alcohol intake, the recommendations for healthy alcohol use, and avoidance of illicit drug use.  Dental Health: discussed importance of regular tooth brushing, flossing, and dental visits.  Injury prevention: discussed safety/seat belts, safety helmets, smoke detectors, carbon dioxide detectors, and smoking near bedding or upholstery.  Sexual health: discussed sexually transmitted diseases, partner selection, use of condoms, avoidance of unintended pregnancy, and contraceptive alternatives.  Exercise: the importance of regular exercise/physical activity was discussed. Recommend exercise 3-5 times per week for at least 30 minutes.     Depression Screening and Follow-up Plan: Patient's depression screening was positive with a PHQ-9 score of 6. Patient advised to follow-up with PCP for further management.     Return in about 6 months (around 7/16/2024) for F/U Chronic Conditions.     Chief Complaint:     Chief Complaint   Patient presents with    Physical Exam      History of Present Illness:     Adult Annual Physical   Patient here for a comprehensive physical exam. The patient reports problems - B/L thumb base pain. RHD. Has had prior R thumb  CSI, L > R as of now. Interested in referral, sharp zings of pain at times. Not numb or tingling.   .  R shoulder years of pain, told it would need to be replaced. RHD.   Concern about s/p.     Diet and Physical Activity  Diet/Nutrition:  watching carbs, avoiding DM2 meds .   Exercise:  Nothing heavy, house chores .   No Drug use. No MMJ.   Tobacco use:  reports that he has never smoked. He has never used smokeless tobacco.  Alcohol use - no    Wt Readings from Last 3 Encounters:   24 107 kg (236 lb)   06/15/23 104 kg (228 lb 3.2 oz)   22 108 kg (238 lb)     Temp Readings from Last 3 Encounters:   10/22/21 98.1 °F (36.7 °C)   20 (!) 97.3 °F (36.3 °C) (Temporal)   20 (!) 97.1 °F (36.2 °C)     BP Readings from Last 3 Encounters:   24 130/82   06/15/23 124/80   22 126/78     Pulse Readings from Last 3 Encounters:   24 78   06/15/23 65   22 66     Depression Screening  PHQ-2/9 Depression Screening    Little interest or pleasure in doing things: 0 - not at all  Feeling down, depressed, or hopeless: 1 - several days  Trouble falling or staying asleep, or sleeping too much: 1 - several days  Feeling tired or having little energy: 1 - several days  Poor appetite or overeatin - not at all  Feeling bad about yourself - or that you are a failure or have let yourself or your family down: 1 - several days  Trouble concentrating on things, such as reading the newspaper or watching television: 1 - several days  Moving or speaking so slowly that other people could have noticed. Or the opposite - being so fidgety or restless that you have been moving around a lot more than usual: 1 - several days  Thoughts that you would be better off dead, or of hurting yourself in some way: 0 - not at all  PHQ-9 Score: 6  PHQ-9 Interpretation: Mild depression       General Health  Sleep: sleeps well and CPAP, some waking with pain .   Hearing: normal - bilateral.  Vision: previous LASIK surgery.    Dental: regular dental visits and brushes teeth twice daily.        Health  Symptoms include: nocturia     Review of Systems:     Review of Systems   Constitutional:  Negative for chills and fever.   Respiratory:  Negative for cough and shortness of breath.    Cardiovascular:  Negative for chest pain and palpitations.   Neurological:  Positive for light-headedness (bending over). Negative for dizziness and headaches.      Past Medical History:     Past Medical History:   Diagnosis Date    Extrinsic asthma 8/15/2008    Exudative pharyngitis 5/20/2020    GERD (gastroesophageal reflux disease)     Hypertension       Past Surgical History:     Past Surgical History:   Procedure Laterality Date    COLONOSCOPY      FACIAL/NECK BIOPSY N/A 8/14/2018    Procedure: EXCISION BIOPSY LESION POSTERIOR NECK;  Surgeon: Wilberto Bowen MD;  Location: [facility] OR;  Service: General      Family History:     Family History   Problem Relation Age of Onset    Depression Son       Social History:     Social History     Socioeconomic History    Marital status: /Civil Union     Spouse name: None    Number of children: None    Years of education: None    Highest education level: None   Occupational History    None   Tobacco Use    Smoking status: Never    Smokeless tobacco: Never   Vaping Use    Vaping status: Never Used   Substance and Sexual Activity    Alcohol use: Yes     Alcohol/week: 1.0 standard drink of alcohol     Types: 1 Cans of beer per week     Comment: twice weekly    Drug use: No    Sexual activity: None   Other Topics Concern    None   Social History Narrative    None     Social Determinants of Health     Financial Resource Strain: Not on file   Food Insecurity: Not on file   Transportation Needs: Not on file   Physical Activity: Not on file   Stress: Not on file   Social Connections: Not on file   Intimate Partner Violence: Not on file   Housing Stability: Not on file      Current Medications:     Current  Outpatient Medications   Medication Sig Dispense Refill    ALPRAZolam (XANAX) 0.25 mg tablet Take 1 tablet (0.25 mg total) by mouth daily at bedtime as needed for anxiety Or for panic attack 30 tablet 0    amLODIPine (NORVASC) 10 mg tablet Take 1 tablet (10 mg total) by mouth daily 90 tablet 2    aspirin (ECOTRIN LOW STRENGTH) 81 mg EC tablet Take 81 mg by mouth daily       bisoprolol-hydrochlorothiazide (ZIAC) 10-6.25 MG per tablet Take 2 tablets by mouth daily 180 tablet 2    Cholecalciferol (Vitamin D3) 250 MCG (45764 UT) TABS Take by mouth      hydrochlorothiazide (HYDRODIURIL) 12.5 mg tablet Take 1 tablet (12.5 mg total) by mouth daily as needed (lower extremity edema, elevated BP) 30 tablet 1    ketoconazole (NIZORAL) 2 % cream Apply topically daily For rash 60 g 1    omega-3-acid ethyl esters (LOVAZA) 1 g capsule Take 2 capsules (2 g total) by mouth 2 (two) times a day 360 capsule 3    pantoprazole (PROTONIX) 40 mg tablet TAKE ONE TABLET BY MOUTH EVERY DAY 90 tablet 1     No current facility-administered medications for this visit.      Allergies:     Allergies   Allergen Reactions    Bactrim [Sulfamethoxazole-Trimethoprim] Diarrhea     vomiting    Pravastatin Palpitations      Physical Exam:     /82   Pulse 78   Ht 6' (1.829 m)   Wt 107 kg (236 lb)   SpO2 94%   BMI 32.01 kg/m²     Physical Exam  Vitals reviewed.   Constitutional:       General: He is not in acute distress.     Appearance: Normal appearance. He is normal weight. He is not ill-appearing.   HENT:      Head: Normocephalic and atraumatic.      Right Ear: Tympanic membrane, ear canal and external ear normal. There is no impacted cerumen.      Left Ear: Tympanic membrane, ear canal and external ear normal. There is no impacted cerumen.      Nose: Nose normal. No congestion or rhinorrhea.      Mouth/Throat:      Mouth: Mucous membranes are moist.      Pharynx: Oropharynx is clear. No oropharyngeal exudate or posterior oropharyngeal  erythema.   Eyes:      General: No scleral icterus.        Right eye: No discharge.         Left eye: No discharge.      Conjunctiva/sclera: Conjunctivae normal.   Cardiovascular:      Rate and Rhythm: Normal rate and regular rhythm.      Pulses: Normal pulses.      Heart sounds: Normal heart sounds. No murmur heard.     No friction rub. No gallop.   Pulmonary:      Effort: Pulmonary effort is normal. No respiratory distress.      Breath sounds: Normal breath sounds. No stridor. No wheezing.   Musculoskeletal:         General: Tenderness (Left 1st CMC, Left biceps groove) present. No swelling. Normal range of motion.      Cervical back: Normal range of motion and neck supple. No rigidity.      Right lower leg: No edema.      Left lower leg: No edema.   Lymphadenopathy:      Cervical: No cervical adenopathy.   Skin:     General: Skin is warm and dry.      Capillary Refill: Capillary refill takes less than 2 seconds.      Coloration: Skin is not jaundiced or pale.   Neurological:      Mental Status: He is alert and oriented to person, place, and time.      Gait: Gait normal.   Psychiatric:         Mood and Affect: Mood normal.         Behavior: Behavior normal.         Thought Content: Thought content normal.         Judgment: Judgment normal.        Marsha Eduardo DO  [facility] PRIMARY CARE [address]

## 2024-01-19 DIAGNOSIS — K21.00 ESOPHAGITIS, REFLUX: ICD-10-CM

## 2024-01-19 RX ORDER — PANTOPRAZOLE SODIUM 40 MG/1
40 TABLET, DELAYED RELEASE ORAL DAILY
Qty: 90 TABLET | Refills: 1 | Status: SHIPPED | OUTPATIENT
Start: 2024-01-19

## 2024-06-17 DIAGNOSIS — E78.5 DYSLIPIDEMIA: ICD-10-CM

## 2024-06-17 RX ORDER — OMEGA-3-ACID ETHYL ESTERS 1 G/1
2 CAPSULE, LIQUID FILLED ORAL 2 TIMES DAILY
Qty: 360 CAPSULE | Refills: 0 | Status: SHIPPED | OUTPATIENT
Start: 2024-06-17

## 2024-06-17 NOTE — TELEPHONE ENCOUNTER
Refill must be reviewed and completed by the office or provider. The refill is unable to be approved or denied by the medication management team.      Last seen 06.2023 - Please review to see if the refill is appropriate.

## 2024-06-18 ENCOUNTER — TELEPHONE (OUTPATIENT)
Dept: CARDIOLOGY CLINIC | Facility: CLINIC | Age: 59
End: 2024-06-18

## 2024-07-14 DIAGNOSIS — K21.00 ESOPHAGITIS, REFLUX: ICD-10-CM

## 2024-07-14 RX ORDER — PANTOPRAZOLE SODIUM 40 MG/1
40 TABLET, DELAYED RELEASE ORAL DAILY
Qty: 100 TABLET | Refills: 1 | Status: SHIPPED | OUTPATIENT
Start: 2024-07-14

## 2024-07-17 ENCOUNTER — OFFICE VISIT (OUTPATIENT)
Dept: FAMILY MEDICINE CLINIC | Facility: HOSPITAL | Age: 59
End: 2024-07-17
Payer: COMMERCIAL

## 2024-07-17 VITALS
HEART RATE: 57 BPM | OXYGEN SATURATION: 96 % | SYSTOLIC BLOOD PRESSURE: 110 MMHG | DIASTOLIC BLOOD PRESSURE: 78 MMHG | WEIGHT: 237 LBS | BODY MASS INDEX: 32.1 KG/M2 | HEIGHT: 72 IN

## 2024-07-17 DIAGNOSIS — E78.5 DYSLIPIDEMIA: ICD-10-CM

## 2024-07-17 DIAGNOSIS — R73.01 IFG (IMPAIRED FASTING GLUCOSE): ICD-10-CM

## 2024-07-17 DIAGNOSIS — I10 BENIGN ESSENTIAL HYPERTENSION: Primary | ICD-10-CM

## 2024-07-17 DIAGNOSIS — F41.9 ANXIETY: ICD-10-CM

## 2024-07-17 PROCEDURE — 99214 OFFICE O/P EST MOD 30 MIN: CPT | Performed by: STUDENT IN AN ORGANIZED HEALTH CARE EDUCATION/TRAINING PROGRAM

## 2024-07-17 NOTE — PROGRESS NOTES
[facility]   Marsha Eduardo DO    Assessment/Plan:      Diagnosis ICD-10-CM Associated Orders   1. Benign essential hypertension  I10       2. IFG (impaired fasting glucose)  R73.01       3. Anxiety  F41.9       4. Dyslipidemia  E78.5         BP stable on current meds. Stay on top of water intake & not getting dehydrated to avoid orthostatic episodes.   Missed lab being open, will try to go again soon. Will adjust lipitor based on labs if needed.   No med changes right now, all meds reviewed.   Can see ortho hand - Dr. Mike.   Return in about 6 months (around 1/17/2025) for Yrly Physical.  Patient may call or return to office with any questions or concerns.   ______________________________________________________________________  Subjective:     Patient ID: Otis Robledo is a 58 y.o. male.  HPI  Otis Robledo  Chief Complaint   Patient presents with   • Follow-up     6 month      If he gets HA, checks BP, if high adds extra dose HCTZ.   Rare lately.   Compliant with meds.   Tries to stay up on water intake. Notices palps.     Rare use xanax, last fill Jan. Sometimes bedtime anxiety.   Some driving anxiety.     Lost job in May. Shoulder better from not using the mouse at work.     Ongoing L hand/thumb pain.   Hx of R thumb pain. Hx of R thumb CMC CSI.     Wt Readings from Last 3 Encounters:   07/17/24 108 kg (237 lb)   01/16/24 107 kg (236 lb)   06/15/23 104 kg (228 lb 3.2 oz)     Temp Readings from Last 3 Encounters:   10/22/21 98.1 °F (36.7 °C)   09/24/20 (!) 97.3 °F (36.3 °C) (Temporal)   09/11/20 (!) 97.1 °F (36.2 °C)     BP Readings from Last 3 Encounters:   07/17/24 110/78   01/16/24 130/82   06/15/23 124/80     Pulse Readings from Last 3 Encounters:   07/17/24 57   01/16/24 78   06/15/23 65      The following portions of the patient's history were reviewed and updated as appropriate: allergies, current medications, past medical history, and problem list.    Review of Systems   Constitutional:   "Negative for chills and fever.   Respiratory:  Negative for cough and shortness of breath.    Cardiovascular:  Negative for chest pain and palpitations.   Genitourinary:  Negative for dysuria and hematuria.   Musculoskeletal:  Positive for arthralgias (Base of L thumb).   Neurological:  Positive for dizziness (w bending), light-headedness (occas with bending) and headaches (a few per month).       Objective:      Vitals:    07/17/24 1415   BP: 110/78   Pulse: 57   SpO2: 96%      Physical Exam  Vitals and nursing note reviewed.   Constitutional:       General: He is not in acute distress.     Appearance: Normal appearance. He is obese. He is not ill-appearing.   HENT:      Head: Normocephalic and atraumatic.   Eyes:      General: No scleral icterus.        Right eye: No discharge.         Left eye: No discharge.   Cardiovascular:      Rate and Rhythm: Normal rate and regular rhythm.      Pulses: Normal pulses.      Heart sounds: Normal heart sounds. No murmur heard.  Pulmonary:      Effort: Pulmonary effort is normal. No respiratory distress.      Breath sounds: Normal breath sounds. No stridor. No wheezing.   Musculoskeletal:      Cervical back: Normal range of motion and neck supple. No rigidity or tenderness.      Right lower leg: Edema (very minimal top of foot swelling into toes) present.      Left lower leg: No edema.   Lymphadenopathy:      Cervical: No cervical adenopathy.   Neurological:      Mental Status: He is alert and oriented to person, place, and time.      Gait: Gait normal.   Psychiatric:         Mood and Affect: Mood normal.         Behavior: Behavior normal.         Thought Content: Thought content normal.         Judgment: Judgment normal.         Portions of the record may have been created with voice recognition software. Occasional wrong word or \"sound alike\" substitutions may have occurred due to the inherent limitations of voice recognition software. Please review the chart carefully and " recognize, using context, where substitutions/typographical errors may have occurred.

## 2024-08-05 DIAGNOSIS — F41.9 ANXIETY: ICD-10-CM

## 2024-08-05 RX ORDER — ALPRAZOLAM 0.25 MG/1
TABLET ORAL
Qty: 30 TABLET | Refills: 0 | Status: SHIPPED | OUTPATIENT
Start: 2024-08-05

## 2024-09-10 ENCOUNTER — TELEPHONE (OUTPATIENT)
Age: 59
End: 2024-09-10

## 2024-09-10 DIAGNOSIS — R21 GROIN RASH: Primary | ICD-10-CM

## 2024-09-10 RX ORDER — KETOCONAZOLE 20 MG/G
CREAM TOPICAL DAILY
Qty: 30 G | Refills: 0 | Status: SHIPPED | OUTPATIENT
Start: 2024-09-10

## 2024-09-10 NOTE — TELEPHONE ENCOUNTER
Patient is requesting Nystatin cream/oint be sent to pharmacy as he is experiencing another flare up with an ongoing yeast infection located in his groin

## 2024-09-24 ENCOUNTER — APPOINTMENT (OUTPATIENT)
Dept: URGENT CARE | Facility: CLINIC | Age: 59
End: 2024-09-24

## 2024-10-12 DIAGNOSIS — I10 BENIGN ESSENTIAL HYPERTENSION: ICD-10-CM

## 2024-10-14 RX ORDER — BISOPROLOL FUMARATE AND HYDROCHLOROTHIAZIDE 10; 6.25 MG/1; MG/1
2 TABLET ORAL DAILY
Qty: 180 TABLET | Refills: 0 | Status: SHIPPED | OUTPATIENT
Start: 2024-10-14

## 2024-11-17 DIAGNOSIS — I10 ESSENTIAL HYPERTENSION: ICD-10-CM

## 2024-11-18 RX ORDER — HYDROCHLOROTHIAZIDE 12.5 MG/1
TABLET ORAL
Qty: 30 TABLET | Refills: 0 | Status: SHIPPED | OUTPATIENT
Start: 2024-11-18

## 2024-12-26 DIAGNOSIS — I10 ESSENTIAL HYPERTENSION: ICD-10-CM

## 2024-12-27 RX ORDER — HYDROCHLOROTHIAZIDE 12.5 MG/1
TABLET ORAL
Qty: 30 TABLET | Refills: 0 | Status: SHIPPED | OUTPATIENT
Start: 2024-12-27

## 2025-01-10 DIAGNOSIS — I10 BENIGN ESSENTIAL HYPERTENSION: ICD-10-CM

## 2025-01-13 DIAGNOSIS — K21.00 ESOPHAGITIS, REFLUX: ICD-10-CM

## 2025-01-13 RX ORDER — BISOPROLOL FUMARATE AND HYDROCHLOROTHIAZIDE 10; 6.25 MG/1; MG/1
2 TABLET ORAL DAILY
Qty: 180 TABLET | Refills: 0 | Status: SHIPPED | OUTPATIENT
Start: 2025-01-13

## 2025-01-14 RX ORDER — PANTOPRAZOLE SODIUM 40 MG/1
40 TABLET, DELAYED RELEASE ORAL DAILY
Qty: 100 TABLET | Refills: 1 | Status: SHIPPED | OUTPATIENT
Start: 2025-01-14

## 2025-02-10 DIAGNOSIS — F41.9 ANXIETY: ICD-10-CM

## 2025-02-12 RX ORDER — ALPRAZOLAM 0.25 MG/1
TABLET ORAL
Qty: 30 TABLET | Refills: 0 | Status: SHIPPED | OUTPATIENT
Start: 2025-02-12

## 2025-02-15 LAB
ALBUMIN SERPL-MCNC: 4.3 G/DL (ref 3.6–5.1)
ALBUMIN/GLOB SERPL: 1.7 (CALC) (ref 1–2.5)
ALP SERPL-CCNC: 92 U/L (ref 35–144)
ALT SERPL-CCNC: 29 U/L (ref 9–46)
AST SERPL-CCNC: 23 U/L (ref 10–35)
BASOPHILS # BLD AUTO: 80 CELLS/UL (ref 0–200)
BASOPHILS NFR BLD AUTO: 0.9 %
BILIRUB SERPL-MCNC: 0.5 MG/DL (ref 0.2–1.2)
BUN SERPL-MCNC: 24 MG/DL (ref 7–25)
BUN/CREAT SERPL: ABNORMAL (CALC) (ref 6–22)
CALCIUM SERPL-MCNC: 9.8 MG/DL (ref 8.6–10.3)
CHLORIDE SERPL-SCNC: 102 MMOL/L (ref 98–110)
CHOLEST SERPL-MCNC: 243 MG/DL
CHOLEST/HDLC SERPL: 5.7 (CALC)
CO2 SERPL-SCNC: 32 MMOL/L (ref 20–32)
CREAT SERPL-MCNC: 0.99 MG/DL (ref 0.7–1.3)
EOSINOPHIL # BLD AUTO: 507 CELLS/UL (ref 15–500)
EOSINOPHIL NFR BLD AUTO: 5.7 %
ERYTHROCYTE [DISTWIDTH] IN BLOOD BY AUTOMATED COUNT: 14.4 % (ref 11–15)
GFR/BSA.PRED SERPLBLD CYS-BASED-ARV: 88 ML/MIN/1.73M2
GLOBULIN SER CALC-MCNC: 2.5 G/DL (CALC) (ref 1.9–3.7)
GLUCOSE SERPL-MCNC: 115 MG/DL (ref 65–99)
HBA1C MFR BLD: 5.9 % OF TOTAL HGB
HCT VFR BLD AUTO: 52.2 % (ref 38.5–50)
HDLC SERPL-MCNC: 43 MG/DL
HGB BLD-MCNC: 17.6 G/DL (ref 13.2–17.1)
LDLC SERPL CALC-MCNC: 158 MG/DL (CALC)
LYMPHOCYTES # BLD AUTO: 2608 CELLS/UL (ref 850–3900)
LYMPHOCYTES NFR BLD AUTO: 29.3 %
MCH RBC QN AUTO: 29.9 PG (ref 27–33)
MCHC RBC AUTO-ENTMCNC: 33.7 G/DL (ref 32–36)
MCV RBC AUTO: 88.6 FL (ref 80–100)
MONOCYTES # BLD AUTO: 730 CELLS/UL (ref 200–950)
MONOCYTES NFR BLD AUTO: 8.2 %
NEUTROPHILS # BLD AUTO: 4975 CELLS/UL (ref 1500–7800)
NEUTROPHILS NFR BLD AUTO: 55.9 %
NONHDLC SERPL-MCNC: 200 MG/DL (CALC)
PLATELET # BLD AUTO: 261 THOUSAND/UL (ref 140–400)
PMV BLD REES-ECKER: 10.3 FL (ref 7.5–12.5)
POTASSIUM SERPL-SCNC: 4.3 MMOL/L (ref 3.5–5.3)
PROT SERPL-MCNC: 6.8 G/DL (ref 6.1–8.1)
RBC # BLD AUTO: 5.89 MILLION/UL (ref 4.2–5.8)
SODIUM SERPL-SCNC: 139 MMOL/L (ref 135–146)
TRIGL SERPL-MCNC: 246 MG/DL
WBC # BLD AUTO: 8.9 THOUSAND/UL (ref 3.8–10.8)

## 2025-02-20 ENCOUNTER — RESULTS FOLLOW-UP (OUTPATIENT)
Dept: FAMILY MEDICINE CLINIC | Facility: HOSPITAL | Age: 60
End: 2025-02-20

## 2025-02-21 ENCOUNTER — OFFICE VISIT (OUTPATIENT)
Dept: FAMILY MEDICINE CLINIC | Facility: HOSPITAL | Age: 60
End: 2025-02-21
Payer: COMMERCIAL

## 2025-02-21 VITALS
BODY MASS INDEX: 29.53 KG/M2 | WEIGHT: 218 LBS | OXYGEN SATURATION: 97 % | SYSTOLIC BLOOD PRESSURE: 130 MMHG | HEIGHT: 72 IN | HEART RATE: 57 BPM | DIASTOLIC BLOOD PRESSURE: 80 MMHG

## 2025-02-21 DIAGNOSIS — R21 RASH: ICD-10-CM

## 2025-02-21 DIAGNOSIS — R73.01 IFG (IMPAIRED FASTING GLUCOSE): ICD-10-CM

## 2025-02-21 DIAGNOSIS — I10 ESSENTIAL HYPERTENSION: ICD-10-CM

## 2025-02-21 DIAGNOSIS — Z00.00 ANNUAL PHYSICAL EXAM: Primary | ICD-10-CM

## 2025-02-21 DIAGNOSIS — E78.5 DYSLIPIDEMIA: ICD-10-CM

## 2025-02-21 PROCEDURE — 99396 PREV VISIT EST AGE 40-64: CPT | Performed by: STUDENT IN AN ORGANIZED HEALTH CARE EDUCATION/TRAINING PROGRAM

## 2025-02-21 RX ORDER — NYSTATIN 100000 U/G
OINTMENT TOPICAL 2 TIMES DAILY
Qty: 30 G | Refills: 1 | Status: SHIPPED | OUTPATIENT
Start: 2025-02-21

## 2025-02-21 RX ORDER — CLOTRIMAZOLE AND BETAMETHASONE DIPROPIONATE 10; .64 MG/G; MG/G
CREAM TOPICAL 2 TIMES DAILY
Qty: 45 G | Refills: 1 | Status: SHIPPED | OUTPATIENT
Start: 2025-02-21

## 2025-02-21 NOTE — PATIENT INSTRUCTIONS
Coronary CT Calcium Score - $99 at [facility], can order.   [facility] site has good info on it.        Plan: Location: Face\\nTreatment: Recommended Yearly PDT\\n\\nPatient is here for PDT follow up and had a great response to treatment. \\nPatient states that there was some redness and peeling, for approximately 3-5 days post blue light treatment. \\nPatient states that the skin has become much more smooth and patient is very pleased with results.\\nDiscussed with patient that blue light therapy is not a \"one and done\" treatment and that they should do one every year in order to slow down the pre cancerous lesions that patient has accumulated over the years due to extensive sun damage. \\nAdvised patient to continue to be very diligent with wearing a good sunscreen that has either zinc oxide or titanium.\\nOverall, patient has shown significant improvement from the blue light therapy treatment. Detail Level: Zone Plan: Location: face\\nPrescribe: Doryx po qd x 30 days\\nSoolantra cream top qhs x 30 days\\nRhofade cream qd x 30 days\\n\\nPt presents with erythema. \\nDiscussed with her that it is an immune mediated condition that irritates the blood vessels and oil glands\\nEducated her on trigger factors such as stress, spicy foods, alcohol, sunlight, etc.\\nRecommend using sunscreen with zinc oxide to help prevent trigger factors from activating\\n\\nWill prescribe Soolantra cream to use nightly to help eliminate mites that are associated with rosacea.\\nDiscussed with pt that we will also prescribe pt Rhofade cream to use in the morning to help reduce redness.\\n\\nWE HAD A LONG DISCUSSION ABOUT THE BEST WAY TO USE DORYX---PT IS GOING TO START BY TAKING 2 X 50MG DAILY AND IF AFTER 4 DAYS PT HAS NOT SEEN MUCH IMPROVEMENT, WILL TITRATE UP---WE WILL FIND THE DOSING SCHEDULE THAT WORKS BEST FOR PT.\\nF/u as needed.

## 2025-02-21 NOTE — PROGRESS NOTES
Adult Annual Physical  Name: Otis Robeldo      : 1965      MRN: [id number]  Encounter Provider: Marsha Eduardo DO  Encounter Date: 2025   Encounter department: [facility] [address]    Assessment & Plan  Annual physical exam  Topics as below.        Rash  Dry skin in L ear.   Orders:  •  clotrimazole-betamethasone (LOTRISONE) 1-0.05 % cream; Apply topically 2 (two) times a day  •  nystatin (MYCOSTATIN) ointment; Apply topically 2 (two) times a day    Essential hypertension  Stay on ziac for now, HCTZ prn if needed for high BP       Dyslipidemia  Not on statin       IFG (impaired fasting glucose)  Prediabetes borderline, but improved.        Return in about 9 months (around 11/10/2025) for BP Check.    Immunizations and preventive care screenings were discussed with patient today. Appropriate education was printed on patient's after visit summary.    Discussed risks and benefits of prostate cancer screening. We discussed the controversial history of PSA screening for prostate cancer in the [place] as well as the risk of over detection and over treatment of prostate cancer by way of PSA screening.  The patient understands that PSA blood testing is an imperfect way to screen for prostate cancer and that elevated PSA levels in the blood may also be caused by infection, inflammation, prostatic trauma or manipulation, urological procedures, or by benign prostatic enlargement.    The role of the digital rectal examination in prostate cancer screening was also discussed and I discussed with him that there is large interobserver variability in the findings of digital rectal examination.    Counseling:  Alcohol/drug use: discussed moderation in alcohol intake, the recommendations for healthy alcohol use, and avoidance of illicit drug use.  Dental Health: discussed importance of regular tooth brushing, flossing, and dental visits.  Injury prevention: discussed safety/seat belts,  safety helmets, smoke detectors, carbon monoxide detectors, and smoking near bedding or upholstery.  Sexual health: discussed sexually transmitted diseases, partner selection, use of condoms, avoidance of unintended pregnancy, and contraceptive alternatives.  Exercise: the importance of regular exercise/physical activity was discussed. Recommend exercise 3-5 times per week for at least 30 minutes.       Depression Screening and Follow-up Plan: Patient was screened for depression during today's encounter. They screened negative with a PHQ-9 score of 3.          History of Present Illness     Adult Annual Physical:  Patient presents for annual physical.     Diet and Physical Activity:  - Diet/Nutrition: well balanced diet, consuming 3-5 servings of fruits/vegetables daily and portion control. good water intake, high protein diet right now, helped him lose 20 lbs.  - Exercise: 5-7 times a week on average and walking. treadmill at home 35 mins daily, house chores and projects too    Depression Screening:    - PHQ-9 Score: 3    General Health:  - Sleep: sleeps well and 4-6 hours of sleep on average. struggling at times to fall asleep  - Hearing: normal hearing bilateral ears.  - Vision: goes for regular eye exams and wears glasses and contacts.  - Dental: brushes teeth once daily and no dental visits for > 1 year.     Health:  - History of STDs: no.   - Urinary symptoms: none.   - wakes sometimes thirsty not to urinate.      Down 20 lbs   Wt Readings from Last 3 Encounters:   02/21/25 98.9 kg (218 lb)   07/17/24 108 kg (237 lb)   01/16/24 107 kg (236 lb)     Temp Readings from Last 3 Encounters:   10/22/21 98.1 °F (36.7 °C)   09/24/20 (!) 97.3 °F (36.3 °C) (Temporal)   09/11/20 (!) 97.1 °F (36.2 °C)     BP Readings from Last 3 Encounters:   02/21/25 130/80   07/17/24 110/78   01/16/24 130/82     Pulse Readings from Last 3 Encounters:   02/21/25 57   07/17/24 57   01/16/24 78     Review of Systems   Constitutional:   Negative for chills and fever.   Respiratory:  Negative for cough and shortness of breath.    Cardiovascular:  Negative for chest pain, palpitations and leg swelling.   Gastrointestinal:  Negative for constipation and diarrhea.   Genitourinary:  Negative for dysuria and frequency.   Neurological:  Positive for light-headedness (if standing up fast afetr laning over). Negative for dizziness and headaches.     Current Outpatient Medications on File Prior to Visit   Medication Sig Dispense Refill   • ALPRAZolam (XANAX) 0.25 mg tablet TAKE ONE TABLET BY MOUTH DAILY AT BEDTIME AS NEEDED FOR ANXIETY OR PANIC ATTACK 30 tablet 0   • aspirin (ECOTRIN LOW STRENGTH) 81 mg EC tablet Take 81 mg by mouth daily      • bisoprolol-hydrochlorothiazide (ZIAC) 10-6.25 MG per tablet TAKE TWO TABLETS BY MOUTH EVERY  tablet 0   • Cholecalciferol (Vitamin D3) 250 MCG (15740 UT) TABS Take by mouth     • hydroCHLOROthiazide 12.5 mg tablet TAKE ONE TABLET BY MOUTH EVERY DAY AS NEEDED FOR LOWER EXTREMITY EDEMA, ELEVATED BLOOD PRESSURE 30 tablet 0   • omega-3-acid ethyl esters (LOVAZA) 1 g capsule TAKE TWO CAPSULES BY MOUTH TWICE A  capsule 0   • pantoprazole (PROTONIX) 40 mg tablet TAKE ONE TABLET BY MOUTH EVERY  tablet 1   • [DISCONTINUED] ketoconazole (NIZORAL) 2 % cream Apply topically daily For rash 60 g 1   • [DISCONTINUED] amLODIPine (NORVASC) 10 mg tablet Take 1 tablet (10 mg total) by mouth daily 90 tablet 2   • [DISCONTINUED] ketoconazole (NIZORAL) 2 % cream Apply topically daily 30 g 0     No current facility-administered medications on file prior to visit.      Social History     Tobacco Use   • Smoking status: Never   • Smokeless tobacco: Never   Vaping Use   • Vaping status: Never Used   Substance and Sexual Activity   • Alcohol use: Yes     Alcohol/week: 1.0 standard drink of alcohol     Types: 1 Cans of beer per week     Comment: twice weekly   • Drug use: No   • Sexual activity: Not on file     Objective    /80   Pulse 57   Ht 6' (1.829 m)   Wt 98.9 kg (218 lb)   SpO2 97%   BMI 29.57 kg/m²     Physical Exam  Vitals reviewed.   Constitutional:       General: He is not in acute distress.     Appearance: Normal appearance. He is normal weight. He is not ill-appearing.   HENT:      Head: Normocephalic and atraumatic.      Right Ear: Tympanic membrane, ear canal and external ear normal. There is no impacted cerumen.      Left Ear: Tympanic membrane, ear canal and external ear normal. There is no impacted cerumen.      Nose: Nose normal. No congestion or rhinorrhea.      Mouth/Throat:      Mouth: Mucous membranes are moist.      Pharynx: Oropharynx is clear. No oropharyngeal exudate or posterior oropharyngeal erythema.   Eyes:      General: No scleral icterus.        Right eye: No discharge.         Left eye: No discharge.      Conjunctiva/sclera: Conjunctivae normal.   Neck:      Comments: No thyroid nodules or thyromegaly.  Cardiovascular:      Rate and Rhythm: Normal rate and regular rhythm.      Pulses: Normal pulses.      Heart sounds: Normal heart sounds. No murmur heard.     No friction rub. No gallop.   Pulmonary:      Effort: Pulmonary effort is normal. No respiratory distress.      Breath sounds: Normal breath sounds. No stridor. No wheezing.   Musculoskeletal:         General: Normal range of motion.      Cervical back: Normal range of motion and neck supple. No rigidity or tenderness.      Right lower leg: No edema.      Left lower leg: No edema.   Lymphadenopathy:      Cervical: No cervical adenopathy.   Skin:     General: Skin is warm and dry.      Capillary Refill: Capillary refill takes less than 2 seconds.      Coloration: Skin is not jaundiced or pale.      Findings: Rash (Dry skin in L ear.) present.   Neurological:      Mental Status: He is alert and oriented to person, place, and time.      Gait: Gait normal.   Psychiatric:         Mood and Affect: Mood normal.         Behavior: Behavior  normal.         Thought Content: Thought content normal.         Judgment: Judgment normal.

## 2025-03-10 DIAGNOSIS — B37.9 YEAST INFECTION: Primary | ICD-10-CM

## 2025-03-10 RX ORDER — FLUCONAZOLE 150 MG/1
150 TABLET ORAL ONCE
Qty: 2 TABLET | Refills: 0 | Status: SHIPPED | OUTPATIENT
Start: 2025-03-10 | End: 2025-03-10

## 2025-04-17 DIAGNOSIS — I10 BENIGN ESSENTIAL HYPERTENSION: ICD-10-CM

## 2025-04-18 RX ORDER — BISOPROLOL FUMARATE AND HYDROCHLOROTHIAZIDE 10; 6.25 MG/1; MG/1
2 TABLET ORAL DAILY
Qty: 180 TABLET | Refills: 1 | Status: SHIPPED | OUTPATIENT
Start: 2025-04-18

## 2025-05-07 ENCOUNTER — OFFICE VISIT (OUTPATIENT)
Dept: PODIATRY | Facility: CLINIC | Age: 60
End: 2025-05-07
Payer: COMMERCIAL

## 2025-05-07 ENCOUNTER — APPOINTMENT (OUTPATIENT)
Dept: RADIOLOGY | Facility: CLINIC | Age: 60
End: 2025-05-07
Attending: PODIATRIST
Payer: COMMERCIAL

## 2025-05-07 VITALS — WEIGHT: 220 LBS | HEIGHT: 72 IN | BODY MASS INDEX: 29.8 KG/M2

## 2025-05-07 DIAGNOSIS — G57.61 LESION OF RIGHT PLANTAR NERVE: Primary | ICD-10-CM

## 2025-05-07 DIAGNOSIS — M79.671 PAIN IN RIGHT FOOT: ICD-10-CM

## 2025-05-07 PROCEDURE — 99203 OFFICE O/P NEW LOW 30 MIN: CPT | Performed by: PODIATRIST

## 2025-05-07 PROCEDURE — 73630 X-RAY EXAM OF FOOT: CPT

## 2025-05-07 PROCEDURE — 64455 NJX AA&/STRD PLTR COM DG NRV: CPT | Performed by: PODIATRIST

## 2025-05-07 RX ORDER — TRIAMCINOLONE ACETONIDE 40 MG/ML
20 INJECTION, SUSPENSION INTRA-ARTICULAR; INTRAMUSCULAR ONCE
Status: COMPLETED | OUTPATIENT
Start: 2025-05-07 | End: 2025-05-07

## 2025-05-07 RX ORDER — LIDOCAINE HYDROCHLORIDE 10 MG/ML
1 INJECTION, SOLUTION EPIDURAL; INFILTRATION; INTRACAUDAL; PERINEURAL ONCE
Status: COMPLETED | OUTPATIENT
Start: 2025-05-07 | End: 2025-05-07

## 2025-05-07 RX ADMIN — LIDOCAINE HYDROCHLORIDE 1 ML: 10 INJECTION, SOLUTION EPIDURAL; INFILTRATION; INTRACAUDAL; PERINEURAL at 17:45

## 2025-05-07 RX ADMIN — TRIAMCINOLONE ACETONIDE 20 MG: 40 INJECTION, SUSPENSION INTRA-ARTICULAR; INTRAMUSCULAR at 17:46

## 2025-05-07 NOTE — PROGRESS NOTES
"               PATIENT:  Otis Robledo  1965       ASSESSMENT:     1. Lesion of right plantar nerve  triamcinolone acetonide (Kenalog-40) 40 mg/mL injection 20 mg    lidocaine (PF) (XYLOCAINE-MPF) 1 % injection 1 mL    Nerve block      2. Pain in right foot  XR foot 3+ vw right    CANCELED: XR foot 3+ vw right                PLAN:  1. Reviewed medical records.  Patient was counseled and educated on the condition and the diagnosis.    2. X-ray was obtained and personally reviewed.  The radiological findings were discussed with the patient.    3. The diagnosis, treatment options and prognosis were discussed with the patient.    4. He has possible Arita's neuroma in right foot.  Cyst or other space occupying soft tissue masses cannot be excluded at this time.    5. Will try nerve block.  See procedure.    6. Instructed supportive care, home exercise, icing, and proper footwear/ arch support.   Discussed possible further images depending on the progress.    7. Patient will return in 6 weeks for re-evaluation.       Imaging: I have personally reviewed pertinent films in PACS  Labs, pathology, and Other Studies: I have personally reviewed pertinent reports.      Nerve block    Date/Time: 5/7/2025 4:30 PM    Performed by: Flako Allen DPM  Authorized by: Flako Allen DPM    Patient location:  [facility] Protocol:  Consent: Verbal consent obtained.  Risks and benefits: risks, benefits and alternatives were discussed  Consent given by: patient  Time out: Immediately prior to procedure a \"time out\" was called to verify the correct patient, procedure, equipment, support staff and site/side marked as required.  Timeout called at: 5/7/2025 5:56 PM.  Patient understanding: patient states understanding of the procedure being performed  Site marked: the operative site was marked  Patient identity confirmed: verbally with patient    Indications:     Indications:  Pain relief and procedural anesthesia  Location:     Body area: "  Lower extremity    Lower extremity nerve:  Digital and plantar (3rd interspace)    Laterality:  Right  Pre-procedure details:     Skin preparation:  Alcohol  Skin anesthesia (see MAR for exact dosages):     Skin anesthesia method:  Topical application    Topical anesthesia: Ethyl Chloride spray.  Procedure details (see MAR for exact dosages):     Block needle gauge:  25 G    Anesthetic injected:  Lidocaine 1% w/o epi    Steroid injected:  Triamcinolone    Injection procedure:  Anatomic landmarks identified and anatomic landmarks palpated  Post-procedure details:     Dressing:  Sterile dressing    Outcome:  Anesthesia achieved    Patient tolerance of procedure:  Tolerated well, no immediate complications        Subjective:       HPI  The patient presents with chief complaint of right foot pain.  He noticed pain and lump in right plantar foot in the last 2 months.  Pain is about 4-5 out of 10.  He feels he walks on lillie.  The patient does not recall any injury.  No significant numbness or paresthesia.  No significant weakness or dysfunction.         The following portions of the patient's history were reviewed and updated as appropriate: allergies, current medications, past family history, past medical history, past social history, past surgical history and problem list.  All pertinent labs and images were reviewed.      Past Medical History  Past Medical History:   Diagnosis Date    Arthritis     Depression     Extrinsic asthma 08/15/2008    Exudative pharyngitis 05/20/2020    GERD (gastroesophageal reflux disease)     Headache(784.0)     Hypertension     Visual impairment        Past Surgical History  Past Surgical History:   Procedure Laterality Date    COLONOSCOPY      EYE SURGERY      FACIAL/NECK BIOPSY N/A 08/14/2018    Procedure: EXCISION BIOPSY LESION POSTERIOR NECK;  Surgeon: Wilberto Bowen MD;  Location: [facility] OR;  Service: General        Allergies:  Bactrim [sulfamethoxazole-trimethoprim] and  Pravastatin    Medications:  Current Outpatient Medications   Medication Sig Dispense Refill    ALPRAZolam (XANAX) 0.25 mg tablet TAKE ONE TABLET BY MOUTH DAILY AT BEDTIME AS NEEDED FOR ANXIETY OR PANIC ATTACK 30 tablet 0    aspirin (ECOTRIN LOW STRENGTH) 81 mg EC tablet Take 81 mg by mouth daily       bisoprolol-hydrochlorothiazide (ZIAC) 10-6.25 MG per tablet TAKE TWO TABLETS BY MOUTH EVERY  tablet 1    Cholecalciferol (Vitamin D3) 250 MCG (41519 UT) TABS Take by mouth      clotrimazole-betamethasone (LOTRISONE) 1-0.05 % cream Apply topically 2 (two) times a day 45 g 1    hydroCHLOROthiazide 12.5 mg tablet TAKE ONE TABLET BY MOUTH EVERY DAY AS NEEDED FOR LOWER EXTREMITY EDEMA, ELEVATED BLOOD PRESSURE 30 tablet 0    nystatin (MYCOSTATIN) ointment Apply topically 2 (two) times a day 30 g 1    omega-3-acid ethyl esters (LOVAZA) 1 g capsule TAKE TWO CAPSULES BY MOUTH TWICE A  capsule 0    pantoprazole (PROTONIX) 40 mg tablet TAKE ONE TABLET BY MOUTH EVERY  tablet 1     No current facility-administered medications for this visit.       Social History:  Social History     Socioeconomic History    Marital status: /Civil Union     Spouse name: None    Number of children: None    Years of education: None    Highest education level: None   Occupational History    None   Tobacco Use    Smoking status: Never    Smokeless tobacco: Never   Vaping Use    Vaping status: Never Used   Substance and Sexual Activity    Alcohol use: Yes     Alcohol/week: 1.0 standard drink of alcohol     Types: 1 Cans of beer per week     Comment: twice weekly    Drug use: No    Sexual activity: None   Other Topics Concern    None   Social History Narrative    None     Social Drivers of Health     Financial Resource Strain: Not on file   Food Insecurity: Not on file   Transportation Needs: Not on file   Physical Activity: Not on file   Stress: Not on file   Social Connections: Not on file   Intimate Partner Violence: Not on  file   Housing Stability: Not on file          Review of Systems   Constitutional:  Negative for chills and fever.   Respiratory:  Negative for cough and shortness of breath.    Cardiovascular:  Negative for chest pain and leg swelling.   Gastrointestinal:  Negative for nausea and vomiting.   Musculoskeletal:  Negative for gait problem.   Skin:  Negative for wound.   Allergic/Immunologic: Negative for immunocompromised state.   Neurological:  Negative for weakness and numbness.   Hematological: Negative.    Psychiatric/Behavioral:  Negative for behavioral problems and confusion.          Objective:      Ht 6' (1.829 m) Comment: stated  Wt 99.8 kg (220 lb)   BMI 29.84 kg/m²          Physical Exam  Vitals reviewed.   Constitutional:       General: He is not in acute distress.     Appearance: He is not toxic-appearing or diaphoretic.   HENT:      Head: Normocephalic and atraumatic.   Eyes:      Extraocular Movements: Extraocular movements intact.   Cardiovascular:      Rate and Rhythm: Normal rate and regular rhythm.      Pulses: Normal pulses.           Dorsalis pedis pulses are 2+ on the right side and 2+ on the left side.        Posterior tibial pulses are 2+ on the right side and 2+ on the left side.   Pulmonary:      Effort: Pulmonary effort is normal. No respiratory distress.   Musculoskeletal:         General: No signs of injury.      Cervical back: Normal range of motion and neck supple.      Right lower leg: No edema.      Left lower leg: No edema.      Right foot: No foot drop.      Left foot: No foot drop.      Comments: Tenderness noted right 3rd interspace.  Swelling presents right 3rd interspace.  No obvious fluctuance.  No redness or warmth.     Skin:     General: Skin is warm.      Capillary Refill: Capillary refill takes less than 2 seconds.      Coloration: Skin is not cyanotic or mottled.      Findings: No abscess or wound.      Nails: There is no clubbing.   Neurological:      General: No focal  deficit present.      Mental Status: He is alert and oriented to person, place, and time.      Cranial Nerves: No cranial nerve deficit.      Sensory: No sensory deficit.      Motor: No weakness.      Coordination: Coordination normal.   Psychiatric:         Mood and Affect: Mood normal.         Behavior: Behavior normal.         Thought Content: Thought content normal.         Judgment: Judgment normal.

## 2025-06-02 ENCOUNTER — APPOINTMENT (INPATIENT)
Dept: RADIOLOGY | Facility: HOSPITAL | Age: 60
DRG: 062 | End: 2025-06-02
Payer: COMMERCIAL

## 2025-06-02 ENCOUNTER — HOSPITAL ENCOUNTER (INPATIENT)
Facility: HOSPITAL | Age: 60
LOS: 3 days | Discharge: HOME | DRG: 062 | End: 2025-06-05
Attending: EMERGENCY MEDICINE | Admitting: INTERNAL MEDICINE
Payer: COMMERCIAL

## 2025-06-02 ENCOUNTER — APPOINTMENT (EMERGENCY)
Dept: RADIOLOGY | Facility: HOSPITAL | Age: 60
DRG: 062 | End: 2025-06-02
Payer: COMMERCIAL

## 2025-06-02 DIAGNOSIS — I10 ESSENTIAL HYPERTENSION: ICD-10-CM

## 2025-06-02 DIAGNOSIS — R29.90 STROKE-LIKE SYMPTOM: Primary | ICD-10-CM

## 2025-06-02 LAB
2HR DELTA HS TROPONIN: 1 NG/L
4HR DELTA HS TROPONIN: 4 NG/L
ANION GAP SERPL CALCULATED.3IONS-SCNC: 13 MMOL/L (ref 4–13)
APTT PPP: 25 SECONDS (ref 23–34)
BUN SERPL-MCNC: 25 MG/DL (ref 5–25)
CALCIUM SERPL-MCNC: 9.8 MG/DL (ref 8.4–10.2)
CARDIAC TROPONIN I PNL SERPL HS: 5 NG/L (ref ?–50)
CARDIAC TROPONIN I PNL SERPL HS: 6 NG/L (ref ?–50)
CARDIAC TROPONIN I PNL SERPL HS: 9 NG/L (ref ?–50)
CHLORIDE SERPL-SCNC: 101 MMOL/L (ref 96–108)
CO2 SERPL-SCNC: 25 MMOL/L (ref 21–32)
CREAT SERPL-MCNC: 1.48 MG/DL (ref 0.6–1.3)
ERYTHROCYTE [DISTWIDTH] IN BLOOD BY AUTOMATED COUNT: 13.2 % (ref 11.6–15.1)
GFR SERPL CREATININE-BSD FRML MDRD: 51 ML/MIN/1.73SQ M
GLUCOSE SERPL-MCNC: 117 MG/DL (ref 65–140)
GLUCOSE SERPL-MCNC: 140 MG/DL (ref 65–140)
HCT VFR BLD AUTO: 53.8 % (ref 36.5–49.3)
HGB BLD-MCNC: 17.9 G/DL (ref 12–17)
INR PPP: 0.95 (ref 0.85–1.19)
MCH RBC QN AUTO: 29.6 PG (ref 26.8–34.3)
MCHC RBC AUTO-ENTMCNC: 33.3 G/DL (ref 31.4–37.4)
MCV RBC AUTO: 89 FL (ref 82–98)
PLATELET # BLD AUTO: 274 THOUSANDS/UL (ref 149–390)
PMV BLD AUTO: 10 FL (ref 8.9–12.7)
POTASSIUM SERPL-SCNC: 3.1 MMOL/L (ref 3.5–5.3)
PROTHROMBIN TIME: 13 SECONDS (ref 12.3–15)
RBC # BLD AUTO: 6.04 MILLION/UL (ref 3.88–5.62)
SODIUM SERPL-SCNC: 139 MMOL/L (ref 135–147)
WBC # BLD AUTO: 14.95 THOUSAND/UL (ref 4.31–10.16)

## 2025-06-02 PROCEDURE — 93005 ELECTROCARDIOGRAM TRACING: CPT

## 2025-06-02 PROCEDURE — 85027 COMPLETE CBC AUTOMATED: CPT

## 2025-06-02 PROCEDURE — 84484 ASSAY OF TROPONIN QUANT: CPT

## 2025-06-02 PROCEDURE — 96375 TX/PRO/DX INJ NEW DRUG ADDON: CPT

## 2025-06-02 PROCEDURE — 3E03317 INTRODUCTION OF OTHER THROMBOLYTIC INTO PERIPHERAL VEIN, PERCUTANEOUS APPROACH: ICD-10-PCS | Performed by: EMERGENCY MEDICINE

## 2025-06-02 PROCEDURE — 85730 THROMBOPLASTIN TIME PARTIAL: CPT

## 2025-06-02 PROCEDURE — 70498 CT ANGIOGRAPHY NECK: CPT

## 2025-06-02 PROCEDURE — 70551 MRI BRAIN STEM W/O DYE: CPT

## 2025-06-02 PROCEDURE — 70496 CT ANGIOGRAPHY HEAD: CPT

## 2025-06-02 PROCEDURE — 99285 EMERGENCY DEPT VISIT HI MDM: CPT

## 2025-06-02 PROCEDURE — 99291 CRITICAL CARE FIRST HOUR: CPT | Performed by: EMERGENCY MEDICINE

## 2025-06-02 PROCEDURE — 82948 REAGENT STRIP/BLOOD GLUCOSE: CPT

## 2025-06-02 PROCEDURE — 99291 CRITICAL CARE FIRST HOUR: CPT | Performed by: INTERNAL MEDICINE

## 2025-06-02 PROCEDURE — 85610 PROTHROMBIN TIME: CPT

## 2025-06-02 PROCEDURE — 80048 BASIC METABOLIC PNL TOTAL CA: CPT

## 2025-06-02 PROCEDURE — 96365 THER/PROPH/DIAG IV INF INIT: CPT

## 2025-06-02 PROCEDURE — 36415 COLL VENOUS BLD VENIPUNCTURE: CPT

## 2025-06-02 RX ORDER — SODIUM CHLORIDE 9 MG/ML
100 INJECTION, SOLUTION INTRAVENOUS CONTINUOUS
Status: DISCONTINUED | OUTPATIENT
Start: 2025-06-02 | End: 2025-06-02

## 2025-06-02 RX ORDER — ATORVASTATIN CALCIUM 40 MG/1
40 TABLET, FILM COATED ORAL EVERY EVENING
Status: DISCONTINUED | OUTPATIENT
Start: 2025-06-02 | End: 2025-06-05 | Stop reason: HOSPADM

## 2025-06-02 RX ORDER — POTASSIUM CHLORIDE 14.9 MG/ML
20 INJECTION INTRAVENOUS ONCE
Status: COMPLETED | OUTPATIENT
Start: 2025-06-02 | End: 2025-06-03

## 2025-06-02 RX ORDER — ONDANSETRON 2 MG/ML
4 INJECTION INTRAMUSCULAR; INTRAVENOUS EVERY 6 HOURS PRN
Status: DISCONTINUED | OUTPATIENT
Start: 2025-06-02 | End: 2025-06-05 | Stop reason: HOSPADM

## 2025-06-02 RX ORDER — SODIUM CHLORIDE 9 MG/ML
100 INJECTION, SOLUTION INTRAVENOUS CONTINUOUS
Status: DISCONTINUED | OUTPATIENT
Start: 2025-06-02 | End: 2025-06-03

## 2025-06-02 RX ORDER — LABETALOL HYDROCHLORIDE 5 MG/ML
10 INJECTION, SOLUTION INTRAVENOUS EVERY 4 HOURS PRN
Status: DISCONTINUED | OUTPATIENT
Start: 2025-06-02 | End: 2025-06-05 | Stop reason: HOSPADM

## 2025-06-02 RX ORDER — LABETALOL HYDROCHLORIDE 5 MG/ML
10 INJECTION, SOLUTION INTRAVENOUS EVERY 4 HOURS PRN
Status: DISCONTINUED | OUTPATIENT
Start: 2025-06-02 | End: 2025-06-02

## 2025-06-02 RX ORDER — LABETALOL HYDROCHLORIDE 5 MG/ML
10 INJECTION, SOLUTION INTRAVENOUS ONCE
Status: COMPLETED | OUTPATIENT
Start: 2025-06-02 | End: 2025-06-02

## 2025-06-02 RX ADMIN — SODIUM CHLORIDE 100 ML/HR: 0.9 INJECTION, SOLUTION INTRAVENOUS at 20:21

## 2025-06-02 RX ADMIN — IOHEXOL 85 ML: 350 INJECTION, SOLUTION INTRAVENOUS at 19:23

## 2025-06-02 RX ADMIN — POTASSIUM CHLORIDE 20 MEQ: 14.9 INJECTION, SOLUTION INTRAVENOUS at 20:21

## 2025-06-02 RX ADMIN — Medication 25 MG: at 19:36

## 2025-06-02 RX ADMIN — LABETALOL HYDROCHLORIDE 10 MG: 5 INJECTION, SOLUTION INTRAVENOUS at 19:24

## 2025-06-02 RX ADMIN — SODIUM CHLORIDE 100 ML/HR: 0.9 INJECTION, SOLUTION INTRAVENOUS at 20:43

## 2025-06-02 NOTE — ED ATTENDING ATTESTATION
6/2/2025  I, Calvin Wilson MD, saw and evaluated the patient. I have discussed the patient with the resident/non-physician practitioner and agree with the resident's/non-physician practitioner's findings, Plan of Care, and MDM as documented in the resident's/non-physician practitioner's note, except where noted. All available labs and Radiology studies were reviewed.  I was present for key portions of any procedure(s) performed by the resident/non-physician practitioner and I was immediately available to provide assistance.       At this point I agree with the current assessment done in the Emergency Department.  I have conducted an independent evaluation of this patient a history and physical is as follows:    ED Course     Emergency Department Note- Otis Robledo 59 y.o. male MRN: [id number]    Unit/Bed#: QCE Encounter: [id number]    Otis Robledo is a 59 y.o. male who presents with   Chief Complaint   Patient presents with    STROKE Alert     LKW 30 min pta 06:40pm. Neglecting R side with aphashia         History of Present Illness   HPI:  Otis Robledo is a 59 y.o. male who presents for evaluation of:  Right facial droop and aphasia.  Patient had onset of symptoms 30 minutes prior to arrival at 6:40 PM.  The patient was brought into the ED by his son; a stroke alert was called and the patient was transported to CT scan for emergent CT scanning.  He is unable to provide any history or review of systems because of his receptive and expressive aphasia.    Review of Systems   Unable to perform ROS: Patient nonverbal       Historical Information   Past Medical History[1]  Past Surgical History[2]  Social History   Social History     Substance and Sexual Activity   Alcohol Use Yes    Alcohol/week: 1.0 standard drink of alcohol    Types: 1 Cans of beer per week    Comment: twice weekly     Social History     Substance and Sexual Activity   Drug Use No     Tobacco Use History[3]  Family History: Family  History[4]    Meds/Allergies   PTA meds:   Prior to Admission Medications   Prescriptions Last Dose Informant Patient Reported? Taking?   ALPRAZolam (XANAX) 0.25 mg tablet   No No   Sig: TAKE ONE TABLET BY MOUTH DAILY AT BEDTIME AS NEEDED FOR ANXIETY OR PANIC ATTACK   Cholecalciferol (Vitamin D3) 250 MCG (50569 UT) TABS  Self Yes No   Sig: Take by mouth   aspirin (ECOTRIN LOW STRENGTH) 81 mg EC tablet  Self Yes No   Sig: Take 81 mg by mouth daily    bisoprolol-hydrochlorothiazide (ZIAC) 10-6.25 MG per tablet   No No   Sig: TAKE TWO TABLETS BY MOUTH EVERY DAY   clotrimazole-betamethasone (LOTRISONE) 1-0.05 % cream   No No   Sig: Apply topically 2 (two) times a day   hydroCHLOROthiazide 12.5 mg tablet   No No   Sig: TAKE ONE TABLET BY MOUTH EVERY DAY AS NEEDED FOR LOWER EXTREMITY EDEMA, ELEVATED BLOOD PRESSURE   nystatin (MYCOSTATIN) ointment   No No   Sig: Apply topically 2 (two) times a day   omega-3-acid ethyl esters (LOVAZA) 1 g capsule   No No   Sig: TAKE TWO CAPSULES BY MOUTH TWICE A DAY   pantoprazole (PROTONIX) 40 mg tablet   No No   Sig: TAKE ONE TABLET BY MOUTH EVERY DAY      Facility-Administered Medications: None     Allergies[5]    Objective   First Vitals:   Blood Pressure: (!) 217/101 (06/02/25 1910)  Pulse: 91 (06/02/25 1910)  Respirations: 20 (06/02/25 1910)  SpO2: 95 % (06/02/25 1910)    Current Vitals:   Blood Pressure: (!) 217/101 (06/02/25 1910)  Pulse: 91 (06/02/25 1910)  Respirations: 20 (06/02/25 1910)  SpO2: 95 % (06/02/25 1910)    No intake or output data in the 24 hours ending 06/02/25 1917    Invasive Devices       None                   Physical Exam  Vitals and nursing note reviewed.   Constitutional:       General: He is not in acute distress.     Appearance: Normal appearance. He is well-developed.   HENT:      Head: Normocephalic and atraumatic.      Right Ear: External ear normal.      Left Ear: External ear normal.      Nose: Nose normal.      Mouth/Throat:      Pharynx: No  oropharyngeal exudate.     Eyes:      Conjunctiva/sclera: Conjunctivae normal.      Pupils: Pupils are equal, round, and reactive to light.       Cardiovascular:      Rate and Rhythm: Normal rate and regular rhythm.   Pulmonary:      Effort: Pulmonary effort is normal. No respiratory distress.   Abdominal:      General: Abdomen is flat. There is no distension.      Palpations: Abdomen is soft.     Musculoskeletal:         General: No deformity. Normal range of motion.      Cervical back: Normal range of motion and neck supple.     Skin:     General: Skin is warm and dry.      Capillary Refill: Capillary refill takes less than 2 seconds.     Neurological:      Mental Status: He is alert. He is disoriented.      Cranial Nerves: Cranial nerve deficit (x right facial) present.      Sensory: No sensory deficit.      Motor: Weakness present.      Coordination: Coordination normal.      Gait: Gait normal.     Psychiatric:      Comments: Thought content, judgment, and decision-making capacity are impaired secondary to acute change in mental status and encephalopathy.           Medical Decision Makin.  Acute right facial droop and receptive aphasia: Plan Complete blood count obtained to assess for leukocytosis and anemia; Basic Metabolic profile obtained to assess for electrolyte disturbance, uremia, and disorders of glucose metabolism; electrocardiogram obtained to assess for arrhythmia; Troponin obtained to assess for myocardial infarction and myocarditis.  CT scan head to rule out intracranial hemorrhage; CT scan head and neck to rule out acute ischemic event.  [facility]'s neurology is at the bedside consulting on the patient.  2.  Hypertension in the setting of suspected acute ischemic stroke: Plan to control blood pressure with labetalol IV.    Critical Care Time Statement: Upon my evaluation, this patient had a high probability of imminent or life-threatening deterioration due to acute ischemic stroke, which  "required my direct attention, intervention, and personal management.  I spent a total of 32 minutes directly providing critical care services, including interpretation of complex medical databases, evaluating for the presence of life-threatening injuries or illnesses, management of organ system failure(s) , complex medical decision making (to support/prevent further life-threatening deterioration)., interpretation of hemodynamic data, and titration of vasoactive medications. This time is exclusive of procedures, teaching, treating other patients, family meetings, and any prior time recorded by providers other than myself.       Recent Results (from the past 36 hours)   Fingerstick Glucose (POCT)    Collection Time: 06/02/25  7:13 PM   Result Value Ref Range    POC Glucose 117 65 - 140 mg/dl     CT stroke alert brain    (Results Pending)   CTA stroke alert (head/neck)    (Results Pending)         Portions of the record may have been created with voice recognition software. Occasional wrong word or \"sound a like\" substitutions may have occurred due to the inherent limitations of voice recognition software.  Read the chart carefully and recognize, using context, where substitutions have occurred.        Critical Care Time  Procedures           [1]   Past Medical History:  Diagnosis Date    Arthritis     Depression     Extrinsic asthma 08/15/2008    Exudative pharyngitis 05/20/2020    GERD (gastroesophageal reflux disease)     Headache(784.0)     Hypertension     Visual impairment    [2]   Past Surgical History:  Procedure Laterality Date    COLONOSCOPY      EYE SURGERY      FACIAL/NECK BIOPSY N/A 08/14/2018    Procedure: EXCISION BIOPSY LESION POSTERIOR NECK;  Surgeon: Wilberto Bowen MD;  Location:  MAIN OR;  Service: General   [3]   Social History  Tobacco Use   Smoking Status Never   Smokeless Tobacco Never   [4]   Family History  Problem Relation Name Age of Onset    Depression Son     [5]   Allergies  Allergen " Reactions    Bactrim [Sulfamethoxazole-Trimethoprim] Diarrhea     vomiting    Pravastatin Palpitations

## 2025-06-02 NOTE — ASSESSMENT & PLAN NOTE
60yo M w/ a PMH of HTN and PAF (not on AC, had d/c on own) Presented as stroke alert on 6/2/2025  7:07 PM with initial NIHSS of 9 and LKW 6:30pm 06/02/25, initial Blood Pressure: (!) 217/101. BP controlled to 175 prior to administration of TNK  Initial presenting deficits were aphasia, R facial droop b/l sensory deficits (cannot sense being touched).   Pt was found to be a thrombolysis candidate within the appropriate time window and without obvious contraindication and TNK was given.   Post thrombolysis exam: R facial droop has improved but still persistent, severe aphasia still present   Current Blood Pressure: (!) 175/83, BP over last 24 hours: BP  Min: 175/83  Max: 217/101   Vascular risk factors: hypertension  Home meds: ASA 81mg    Workup:  Lab Results   Component Value Date    HGBA1C 5.9 (H) 02/14/2025    CHOLESTEROL 243 (H) 02/14/2025    LDLCALC 158 (H) 02/14/2025    TRIG 246 (H) 02/14/2025    INR 0.95 06/02/2025      CTH (upon presentation): No intracranial mass, mass effect or midline shift. No CT signs of acute infarction.  No acute parenchymal hemorrhage. Normal intracranial vasculature.  CTA: negative for LVOs, aneurysms, dissection or AVMs, has mild bilat carotid and vert origin plaque,   MRI: pending   Repeat CTH at 24 hours: pending   Echocardiogram: pending  Telemetry: no events    Pertinent scores:  - NIHSS: 9  Stroke Modified [place] Score: 0 (No baseline symptoms/disability)    Impression: new acute onset aphasia and R facial droop concerning for acute stroke w/I time window for TNK and TNK administered; possible etiology is past hx of PAF not on AC     Plan:  - Stroke pathway  - BP: BP as close to but <180 SBP due to recent tPA administration  - With thrombolysis admin monitor BP and neuro exam q 15 mins x 2 hours then q 30 mins x 6 hours then q1 hour x 24 hours  - If SBP is >180, increase frequency of BP measurements and administer antihypertensive agents as needed to maintain at or below 180  SBP  - Obtain lipids and A1c  - atorvastatin 40mg qhs  - Maintain glucose <180, SSI for coverage if indicated  - Repeat CTH if GCS drops 2pts in 1hr or if post thrombolysis if pt is reporting severe headache, acute increase in BP, N/V  - Medical management as per primary team appreciated  - TTE  - MRI brain pending  - DVT ppx held for 24 hours due to thrombolysis administration; Apply SCDs  - Monitor on telemetry  - PT/OT/Speech/PM&R input appreciated  - Repeat CTH at 24 hours after thrombolysis administration prior to starting AP/AC  - CPAP for HIEU   - Stroke education  - Upon chart review and interview with patient, mixed history from patient and documentation, unclear if patient have definitive diagnosis of A-fib or should be on anticoagulation, likely need to clarify with cardiology.  If cardiology felt like he does not have A-fib, we will look into secondary causes such as hypercoagulable state.

## 2025-06-02 NOTE — LETTER
Thank you for allowing us to participate in the care of your patient, Otis Robledo, who was hospitalized 6/2/2025 due to strokelike symptoms.  Was found to have a right facial droop and was given TNK.  Was initially placed into the ICU and was noted to have A-fib.  Today was started with anticoagulation.  Deemed fit for discharge.    Patient was started on Eliquis today on 6/5/2025  I did read encouraged the patient to continue this to prevent stroke  PT OT did evaluate no postacute rehab needs  Today deemed fit for discharge    If you have any additional questions or would like to discuss further, please feel free to contact me.    Gus Martinez MD  [facility] Internal Medicine, Hospitalist  [phone]

## 2025-06-02 NOTE — PROGRESS NOTES
Pastoral Care Progress Note          Chaplaincy Interventions Utilized:   Empowerment: Encouraged focus on present and Encouraged self-care    Exploration: Explored hope and Facilitated story telling     Relationship Building: Listened empathically, Hospitality, and Provided silent and supportive presence    Ritual: Provided prayer        Chaplaincy Outcomes Achieved:  Debriefed/defused experience, Distress reduced, Expressed gratitude, and Expressed peace        Spiritual Coping Strategies Utilized:   Spiritual comfort and Spiritual gratitude       06/02/25 1900   Clinical Encounter Type   Visited With Patient and family together   Crisis Visit Critical Care  (stroke)   Zoroastrian Encounters   Zoroastrian Needs Prayer   Patient Spiritual Encounters   Spiritual Encounter Notes The Pt came with stroke. Lost his speech. His family present - worried and concerned. Spiritual and emotional support provided.

## 2025-06-02 NOTE — ED PROVIDER NOTES
Time reflects when diagnosis was documented in both MDM as applicable and the Disposition within this note       Time User Action Codes Description Comment    6/2/2025  7:05 PM Markle, Brooke M Add [R29.90] Stroke-like symptom     6/5/2025  1:33 PM Gus Martinez [I10] Essential hypertension           ED Disposition       ED Disposition   Admit    Condition   Stable    Date/Time   Mon Jun 2, 2025  7:49 PM    Comment                  Assessment & Plan       Medical Decision Making  Amount and/or Complexity of Data Reviewed  Labs: ordered.  Radiology: ordered.    Risk  Prescription drug management.  Decision regarding hospitalization.    Pt is a 59-year-old male, coming in as a prehospital stroke alert for expressive aphasia, right-sided neglect, mild right-sided facial palsy.  Last known well 30 minutes ago.    Initial presentation pt is in no acute distress    On exam   General: NIH 9  Head: Normocephalic, atraumatic, nontender.  Eyes: PERRL, EOM-I. No diplopia.   No hyphema.   No subconjunctival hemorrhages.  Symmetrical lids.   ENT: Atraumatic external nose and ears.    MMM  No malocclusion. No stridor. Normal phonation. No drooling. Normal swallowing.   Neck: Symmetric, trachea midline. No JVD.  CV: RRR. +S1/S2  No murmurs or gallops  Peripheral pulses +2 throughout. No chest wall tenderness.   Lungs:   Unlabored No retractions  CTAB, lungs sounds equal bilateral.   No tachypnea.   Abd: +BS, soft, NT/ND.   MSK:   FROM   Back:   No rashes  Skin: Dry, intact.   Neuro: Patient has expressive aphasia with right-sided neglect, right-sided facial droop, full strength and sensory in all other extremities.  Motor grossly intact.  Psychiatric/Behavioral: Appropriate mood and affect   Exam: deferred      Ddx: Strokelike symptoms    Patient was a stroke alert, evaluated with CTA, labs in the ED.  No acute bleed in the CT.  Given NIH of 9, neurology discussed risks of TNK and patient was given TNK by them in the ED.   Patient was also given IV Lopressor for blood pressure control.    Patient was admitted to neuro ICU for post TNK monitoring.  ED Course as of 06/06/25 1308   Wed Jun 04, 2025   1018 59-year-old male, coming in as a prehospital stroke alert for expressive aphasia, right-sided neglect, mild right-sided facial palsy.  Last known well 30 minutes ago.       Medications   labetalol (NORMODYNE) injection 10 mg (10 mg Intravenous Given 6/2/25 1924)   iohexol (OMNIPAQUE) 350 MG/ML injection (SINGLE-DOSE) 85 mL (85 mL Intravenous Given 6/2/25 1923)   tenecteplase (TNKase) injection 25 mg (25 mg Intravenous Given 6/2/25 1936)   potassium chloride 20 mEq IVPB (premix) (20 mEq Intravenous New Bag 6/2/25 2021)       ED Risk Strat Scores         Stroke Assessment       Row Name 06/02/25 1921             NIH Stroke Scale    Interval --      Level of Consciousness (1a.) 0      LOC Questions (1b.) 2      LOC Commands (1c.) 2      Best Gaze (2.) 0      Visual (3.) 0      Facial Palsy (4.) 1      Motor Arm, Left (5a.) 0      Motor Arm, Right (5b.) 0      Motor Leg, Left (6a.) 0      Motor Leg, Right (6b.) 0      Limb Ataxia (7.) 0      Sensory (8.) 0      Best Language (9.) 0      Dysarthria (10.) 0      Extinction and Inattention (11.) (Formerly Neglect) 1      Total 6                              No data recorded        SBIRT 20yo+      Flowsheet Row Most Recent Value   Initial Alcohol Screen: US AUDIT-C     1. How often do you have a drink containing alcohol? 0 Filed at: 06/02/2025 1946   2. How many drinks containing alcohol do you have on a typical day you are drinking?  0 Filed at: 06/02/2025 1946   Audit-C Score 0 Filed at: 06/02/2025 1946   NORMA: How many times in the past year have you...    Used an illegal drug or used a prescription medication for non-medical reasons? Never Filed at: 06/02/2025 1946                            History of Present Illness       Chief Complaint   Patient presents with    STROKE Alert     LKEVANGELINA  30 min pta 06:40pm. Neglecting R side with aphashia       Past Medical History[1]   Past Surgical History[2]   Family History[3]   Social History[4]   E-Cigarette/Vaping    E-Cigarette Use Never User       E-Cigarette/Vaping Substances    Nicotine No     THC No     CBD No     Flavoring No     Other No     Unknown No       I have reviewed and agree with the history as documented.     HPI    Review of Systems        Objective       ED Triage Vitals   Temperature Pulse Blood Pressure Respirations SpO2 Patient Position - Orthostatic VS   06/02/25 1936 06/02/25 1910 06/02/25 1910 06/02/25 1910 06/02/25 1910 06/02/25 2030   97.8 °F (36.6 °C) 91 (!) 217/101 20 95 % Lying      Temp Source Heart Rate Source BP Location FiO2 (%) Pain Score    06/02/25 1936 06/02/25 1910 06/02/25 2030 -- 06/02/25 2130    Oral Monitor Right arm  2      Vitals      Date and Time Temp Pulse SpO2 Resp BP Pain Score FACES Pain Rating User   06/05/25 0800 -- -- -- -- -- No Pain -- AP   06/05/25 0719 -- 60 97 % -- 151/99 -- -- DII   06/05/25 0708 97.2 °F (36.2 °C) 58 92 % 18 -- -- -- DII   06/05/25 0636 -- 65 95 % -- 186/112 -- -- DII   06/05/25 0633 -- 67 94 % -- 176/115 -- -- DII   06/05/25 0559 -- 54 97 % -- 151/99 -- -- DII   06/05/25 0557 -- -- -- -- -- 2 -- AB   06/05/25 0048 -- 60 93 % -- 131/83 -- -- DII   06/05/25 0009 -- 64 93 % -- 138/86 -- -- DII   06/04/25 2346 -- 60 93 % -- 164/95 -- -- DII   06/04/25 2339 -- 63 93 % -- 170/102 -- -- DII   06/04/25 2245 -- 71 96 % -- 180/118 -- -- DII   06/04/25 2236 97.5 °F (36.4 °C) 71 95 % 20 179/115 -- -- DII   06/04/25 2000 -- -- 96 % -- -- 3 -- AB   06/04/25 1950 -- -- -- -- -- 3 -- AB   06/04/25 1928 97.6 °F (36.4 °C) 54 94 % 19 137/84 -- -- DII   06/04/25 1605 98.1 °F (36.7 °C) 50 95 % 24 118/78 No Pain -- B   06/04/25 1420 -- -- -- -- -- No Pain -- EG   06/04/25 1409 -- -- -- -- -- No Pain -- JAL   06/04/25 1400 -- 60 95 % 26 -- -- -- B   06/04/25 1253 -- -- -- -- -- 3 -- [facility]   06/04/25  1200 97.9 °F (36.6 °C) 60 95 % 19 128/81 -- -- JMB   06/04/25 1005 -- 62 96 % 17 136/82 -- -- JMB   06/04/25 0900 -- 68 97 % 11 -- -- -- JMB   06/04/25 0812 -- -- -- -- -- 3 -- JMB   06/04/25 0800 97.7 °F (36.5 °C) 56 94 % 36 147/89 -- -- JMB   06/04/25 0700 -- 56 96 % 14 140/90 -- -- JI   06/04/25 0600 -- 56 95 % 21 -- -- -- JI   06/04/25 0500 97.7 °F (36.5 °C) 50 96 % 20 136/75 -- -- JI   06/04/25 0400 -- 50 97 % 20 124/75 -- -- JI   06/04/25 0300 -- 48 97 % 17 118/82 -- -- JI   06/04/25 0200 -- 52 96 % 10 134/98 -- -- JI   06/04/25 0100 -- 50 96 % 16 110/69 -- -- JI   06/04/25 0000 -- 58 96 % 11 133/76 -- -- JI   06/03/25 2300 -- 64 95 % 25 129/69 -- -- JI   06/03/25 2200 -- 58 96 % 13 129/74 -- -- JI   06/03/25 2100 -- 66 94 % 20 132/69 -- -- JI   06/03/25 2000 99.4 °F (37.4 °C) 68 96 % 20 159/100 No Pain -- JI   06/03/25 1700 -- 64 94 % 25 146/80 -- -- PJT   06/03/25 1600 97.8 °F (36.6 °C) 66 94 % -- 158/85 -- -- PJT   06/03/25 1500 -- 74 93 % 20 156/84 -- -- LY   06/03/25 1430 -- 74 -- -- 156/84 -- -- EMF   06/03/25 1400 -- 68 94 % 18 139/80 -- -- BB   06/03/25 1300 -- 60 95 % 16 145/86 -- -- LY   06/03/25 1200 98 °F (36.7 °C) 60 94 % 17 152/66 -- -- BB   06/03/25 1100 -- 68 94 % 19 167/95 -- -- BB   06/03/25 1009 -- -- -- -- -- No Pain -- KG   06/03/25 1008 -- -- -- -- -- No Pain -- EG   06/03/25 1000 -- 70 94 % 22 165/96 -- -- BB   06/03/25 0900 -- 62 94 % 16 137/86 -- -- BB   06/03/25 0800 -- -- -- 20 -- -- -- LY   06/03/25 0800 97.7 °F (36.5 °C) 72 92 % -- 148/96 2 -- BB   06/03/25 0700 -- 60 94 % 13 119/77 -- -- AD   06/03/25 0600 -- 58 94 % 13 120/73 -- -- AD   06/03/25 0500 -- 64 95 % 62 -- -- -- AD   06/03/25 0439 -- 62 93 % 15 131/79 -- -- AD   06/03/25 0415 -- 68 93 % 59 158/80 -- -- AD   06/03/25 0315 -- 70 96 % 29 160/85 -- -- AD   06/03/25 0245 -- 72 93 % 17 140/86 -- -- AD   06/03/25 0215 -- 72 94 % 18 143/79 -- -- AD   06/03/25 0145 -- 70 97 % 16 134/77 -- -- AD   06/03/25 0115 -- 70 95 % 21  143/78 -- -- AD   06/03/25 0045 -- 74 97 % 30 151/92 -- -- AD   06/02/25 2345 -- 76 97 % 21 162/94 -- -- AD   06/02/25 2330 -- 68 98 % 41 151/89 -- -- AD   06/02/25 2315 -- 72 98 % 67 143/84 -- -- AD   06/02/25 2300 -- 70 97 % 24 146/87 -- -- AD   06/02/25 2245 -- 70 97 % 22 163/96 -- -- AD   06/02/25 2230 -- 70 97 % 24 159/99 -- -- AD   06/02/25 2215 -- 70 95 % 32 158/96 -- -- AD   06/02/25 2200 -- 74 96 % 40 154/82 -- -- AD   06/02/25 2145 -- 72 96 % 23 165/95 -- -- AD   06/02/25 2130 -- -- -- -- -- 2 -- AD   06/02/25 2130 -- 72 98 % 22 157/87 -- -- MB   06/02/25 2100 -- 76 97 % 107 175/107 -- -- MS   06/02/25 2045 -- 76 97 % 22 175/107 -- -- MS   06/02/25 2030 -- 70 -- 18 165/102 -- -- MS   06/02/25 2015 -- 77 96 % 18 165/106 -- -- MS   06/02/25 2000 -- 78 96 % 18 174/96 -- -- MS   06/02/25 1945 -- 88 99 % 18 169/90 -- -- MS   06/02/25 1936 97.8 °F (36.6 °C) 85 96 % 18 175/83 -- -- MS   06/02/25 1930 -- 88 94 % 100 166/83 -- -- AD   06/02/25 1918 -- 87 96 % 18 206/95 -- -- [facility]   06/02/25 1917 -- -- 95 % -- -- -- -- [facility]   06/02/25 1910 -- 91 95 % 20 217/101 -- -- [facility]            Physical Exam    Results Reviewed       Procedure Component Value Units Date/Time    Lipid Panel with Direct LDL reflex [[id number]]  (Abnormal) Collected: 06/03/25 0511    Lab Status: Final result Specimen: Blood from Arm, Left Updated: 06/03/25 0550     Cholesterol 220 mg/dL      Triglycerides 228 mg/dL      HDL, Direct 36 mg/dL      LDL Calculated 138 mg/dL     Comprehensive metabolic panel [[id number]]  (Abnormal) Collected: 06/03/25 0511    Lab Status: Final result Specimen: Blood from Arm, Left Updated: 06/03/25 0550     Sodium 139 mmol/L      Potassium 3.7 mmol/L      Chloride 103 mmol/L      CO2 27 mmol/L      ANION GAP 9 mmol/L      BUN 20 mg/dL      Creatinine 0.97 mg/dL      Glucose 104 mg/dL      Calcium 8.7 mg/dL      AST 20 U/L      ALT 32 U/L      Alkaline Phosphatase 52 U/L      Total Protein 6.5 g/dL      Albumin 3.9 g/dL       Total Bilirubin 1.03 mg/dL      eGFR 85 ml/min/1.73sq m     Narrative:      National Kid[employer] Foundation guidelines for Chronic Kidney Disease (CKD):     Stage 1 with normal or high GFR (GFR > 90 mL/min/1.73 square meters)    Stage 2 Mild CKD (GFR = 60-89 mL/min/1.73 square meters)    Stage 3A Moderate CKD (GFR = 45-59 mL/min/1.73 square meters)    Stage 3B Moderate CKD (GFR = 30-44 mL/min/1.73 square meters)    Stage 4 Severe CKD (GFR = 15-29 mL/min/1.73 square meters)    Stage 5 End Stage CKD (GFR <15 mL/min/1.73 square meters)  Note: GFR calculation is accurate only with a steady state creatinine    Magnesium [[id number]]  (Abnormal) Collected: 06/03/25 0511    Lab Status: Final result Specimen: Blood from Arm, Left Updated: 06/03/25 0550     Magnesium 1.7 mg/dL     Phosphorus [[id number]]  (Normal) Collected: 06/03/25 0511    Lab Status: Final result Specimen: Blood from Arm, Left Updated: 06/03/25 0550     Phosphorus 3.5 mg/dL     Hemoglobin A1c w/EAG Estimation [[id number]]  (Abnormal) Collected: 06/03/25 0511    Lab Status: Final result Specimen: Blood from Arm, Left Updated: 06/03/25 0528     Hemoglobin A1C 6.0 %       mg/dl     CBC and differential [[id number]]  (Abnormal) Collected: 06/03/25 0511    Lab Status: Final result Specimen: Blood from Arm, Left Updated: 06/03/25 0524     WBC 11.10 Thousand/uL      RBC 5.49 Million/uL      Hemoglobin 16.3 g/dL      Hematocrit 48.9 %      MCV 89 fL      MCH 29.7 pg      MCHC 33.3 g/dL      RDW 13.1 %      MPV 10.0 fL      Platelets 195 Thousands/uL      nRBC 0 /100 WBCs      Segmented % 66 %      Immature Grans % 0 %      Lymphocytes % 24 %      Monocytes % 8 %      Eosinophils Relative 1 %      Basophils Relative 1 %      Absolute Neutrophils 7.38 Thousands/µL      Absolute Immature Grans 0.03 Thousand/uL      Absolute Lymphocytes 2.63 Thousands/µL      Absolute Monocytes 0.84 Thousand/µL      Eosinophils Absolute 0.16 Thousand/µL      Basophils Absolute  0.06 Thousands/µL     HS Troponin I 4hr [[id number]]  (Normal) Collected: 06/02/25 2320    Lab Status: Final result Specimen: Blood from Arm, Left Updated: 06/02/25 2354     hs TnI 4hr 9 ng/L      Delta 4hr hsTnI 4 ng/L     HS Troponin I 2hr [[id number]]  (Normal) Collected: 06/02/25 2059    Lab Status: Final result Specimen: Blood from Arm, Left Updated: 06/02/25 2131     hs TnI 2hr 6 ng/L      Delta 2hr hsTnI 1 ng/L     Basic metabolic panel [[id number]]  (Abnormal) Collected: 06/02/25 1914    Lab Status: Final result Specimen: Blood from Arm, Right Updated: 06/02/25 1948     Sodium 139 mmol/L      Potassium 3.1 mmol/L      Chloride 101 mmol/L      CO2 25 mmol/L      ANION GAP 13 mmol/L      BUN 25 mg/dL      Creatinine 1.48 mg/dL      Glucose 140 mg/dL      Calcium 9.8 mg/dL      eGFR 51 ml/min/1.73sq m     Narrative:      National Kidney Disease Foundation guidelines for Chronic Kidney Disease (CKD):     Stage 1 with normal or high GFR (GFR > 90 mL/min/1.73 square meters)    Stage 2 Mild CKD (GFR = 60-89 mL/min/1.73 square meters)    Stage 3A Moderate CKD (GFR = 45-59 mL/min/1.73 square meters)    Stage 3B Moderate CKD (GFR = 30-44 mL/min/1.73 square meters)    Stage 4 Severe CKD (GFR = 15-29 mL/min/1.73 square meters)    Stage 5 End Stage CKD (GFR <15 mL/min/1.73 square meters)  Note: GFR calculation is accurate only with a steady state creatinine    HS Troponin 0hr (reflex protocol) [[id number]]  (Normal) Collected: 06/02/25 1914    Lab Status: Final result Specimen: Blood from Arm, Right Updated: 06/02/25 1942     hs TnI 0hr 5 ng/L     Protime-INR [[id number]]  (Normal) Collected: 06/02/25 1914    Lab Status: Final result Specimen: Blood from Arm, Right Updated: 06/02/25 1932     Protime 13.0 seconds      INR 0.95    Narrative:      INR Therapeutic Range    Indication                                             INR Range      Atrial Fibrillation                                                2.0-3.0  Hypercoagulable State                                    2.0.2.3  Left Ventricular Asist Device                            2.0-3.0  Mechanical Heart Valve                                  -    Aortic(with afib, MI, embolism, HF, LA enlargement,    and/or coagulopathy)                                     2.0-3.0 (2.5-3.5)     Mitral                                                             2.5-3.5  Prosthetic/Bioprosthetic Heart Valve               2.0-3.0  Venous thromboembolism (VTE: VT, PE        2.0-3.0    APTT [[id number]]  (Normal) Collected: 06/02/25 1914    Lab Status: Final result Specimen: Blood from Arm, Right Updated: 06/02/25 1932     PTT 25 seconds     CBC and Platelet [[id number]]  (Abnormal) Collected: 06/02/25 1914    Lab Status: Final result Specimen: Blood from Arm, Right Updated: 06/02/25 1927     WBC 14.95 Thousand/uL      RBC 6.04 Million/uL      Hemoglobin 17.9 g/dL      Hematocrit 53.8 %      MCV 89 fL      MCH 29.6 pg      MCHC 33.3 g/dL      RDW 13.2 %      Platelets 274 Thousands/uL      MPV 10.0 fL     Fingerstick Glucose (POCT) [[id number]]  (Normal) Collected: 06/02/25 1913    Lab Status: Final result Specimen: Blood Updated: 06/02/25 1914     POC Glucose 117 mg/dl             CT head wo contrast   Final Interpretation by Krunal Fajardo DO (06/05 0145)      No acute intracranial hemorrhage or acute territorial infarction.                  Workstation performed: [id number]         CT head wo contrast   Final Interpretation by Wilmer Ford MD (06/03 2048)      Known small cortical infarct in left frontal lobe was better evaluated on earlier same day MRI brain without contrast. No acute intracranial hemorrhage.                  Workstation performed: [id number]         MRI brain wo contrast   Final Interpretation by Chirag Stafford MD (06/03 0548)      Single small acute cortical infarction involving the left frontal lobe premotor cortex region.      The study was marked  in EPIC for immediate notification.      Workstation performed: [id number]         CTA stroke alert (head/neck)   Final Interpretation by Johnny Fitch MD (06/02 1936)      No acute process on CTA neck and brain.               Findings were directly discussed with Dr. Eason at 7:32 p.m. on 6/2/2025.      Workstation performed: [id number]         CT stroke alert brain   Final Interpretation by Johnny Fitch MD (06/02 1922)      No acute intracranial abnormality.      Findings were directly discussed with Dr. Wilson at approximately 7:21 p.m. on 6/2/2025.      Workstation performed: [id number]             Procedures    ED Medication and Procedure Management   Prior to Admission Medications   Prescriptions Last Dose Informant Patient Reported? Taking?   ALPRAZolam (XANAX) 0.25 mg tablet 6/2/2025  No Yes   Sig: TAKE ONE TABLET BY MOUTH DAILY AT BEDTIME AS NEEDED FOR ANXIETY OR PANIC ATTACK   Cholecalciferol (Vitamin D3) 250 MCG (74027 UT) TABS 6/2/2025 Self Yes Yes   Sig: Take by mouth   aspirin (ECOTRIN LOW STRENGTH) 81 mg EC tablet 6/2/2025 Self Yes Yes   Sig: Take 81 mg by mouth in the morning.   bisoprolol-hydrochlorothiazide (ZIAC) 10-6.25 MG per tablet 6/2/2025  No Yes   Sig: TAKE TWO TABLETS BY MOUTH EVERY DAY   clotrimazole-betamethasone (LOTRISONE) 1-0.05 % cream Unknown  No No   Sig: Apply topically 2 (two) times a day   hydroCHLOROthiazide 12.5 mg tablet 6/2/2025  No Yes   Sig: TAKE ONE TABLET BY MOUTH EVERY DAY AS NEEDED FOR LOWER EXTREMITY EDEMA, ELEVATED BLOOD PRESSURE   nystatin (MYCOSTATIN) ointment Not Taking  No No   Sig: Apply topically 2 (two) times a day   Patient not taking: Reported on 6/2/2025   omega-3-acid ethyl esters (LOVAZA) 1 g capsule 6/2/2025  No Yes   Sig: TAKE TWO CAPSULES BY MOUTH TWICE A DAY   pantoprazole (PROTONIX) 40 mg tablet 6/2/2025  No Yes   Sig: TAKE ONE TABLET BY MOUTH EVERY DAY      Facility-Administered Medications: None     Discharge Medication List as of  6/5/2025  2:19 PM        START taking these medications    Details   apixaban (ELIQUIS) 5 mg Take 1 tablet (5 mg total) by mouth 2 (two) times a day, Starting Thu 6/5/2025, Until Wed 9/3/2025, Normal      atorvastatin (LIPITOR) 40 mg tablet Take 1 tablet (40 mg total) by mouth every evening, Starting Thu 6/5/2025, Until Wed 9/3/2025, Normal      bisoprolol (ZEBETA) 10 MG tablet Take 2 tablets (20 mg total) by mouth daily, Starting Fri 6/6/2025, Until Thu 9/4/2025, Normal           CONTINUE these medications which have CHANGED    Details   hydroCHLOROthiazide 25 mg tablet Take 1 tablet (25 mg total) by mouth daily, Starting Thu 6/5/2025, Until Wed 9/3/2025, Normal           CONTINUE these medications which have NOT CHANGED    Details   ALPRAZolam (XANAX) 0.25 mg tablet TAKE ONE TABLET BY MOUTH DAILY AT BEDTIME AS NEEDED FOR ANXIETY OR PANIC ATTACK, Normal      aspirin (ECOTRIN LOW STRENGTH) 81 mg EC tablet Take 81 mg by mouth in the morning., Historical Med      Cholecalciferol (Vitamin D3) 250 MCG (32611 UT) TABS Take by mouth, Historical Med      omega-3-acid ethyl esters (LOVAZA) 1 g capsule TAKE TWO CAPSULES BY MOUTH TWICE A DAY, Starting Mon 6/17/2024, Normal      pantoprazole (PROTONIX) 40 mg tablet TAKE ONE TABLET BY MOUTH EVERY DAY, Starting Tue 1/14/2025, Normal      clotrimazole-betamethasone (LOTRISONE) 1-0.05 % cream Apply topically 2 (two) times a day, Starting Fri 2/21/2025, Normal           STOP taking these medications       bisoprolol-hydrochlorothiazide (ZIAC) 10-6.25 MG per tablet Comments:   Reason for Stopping:         nystatin (MYCOSTATIN) ointment Comments:   Reason for Stopping:               ED SEPSIS DOCUMENTATION   Time reflects when diagnosis was documented in both MDM as applicable and the Disposition within this note       Time User Action Codes Description Comment    6/2/2025  7:05 PM Markle, Brooke M Add [R29.90] Stroke-like symptom     6/5/2025  1:33 PM Gus Martinez [I10]  Essential hypertension                    [1]   Past Medical History:  Diagnosis Date    Arthritis     Depression     Extrinsic asthma 08/15/2008    Exudative pharyngitis 05/20/2020    GERD (gastroesophageal reflux disease)     Headache(784.0)     Hypertension     Visual impairment    [2]   Past Surgical History:  Procedure Laterality Date    COLONOSCOPY      EYE SURGERY      FACIAL/NECK BIOPSY N/A 08/14/2018    Procedure: EXCISION BIOPSY LESION POSTERIOR NECK;  Surgeon: Wilberto Bowen MD;  Location:  MAIN OR;  Service: General   [3]   Family History  Problem Relation Name Age of Onset    Depression Son     [4]   Social History  Tobacco Use    Smoking status: Never    Smokeless tobacco: Never   Vaping Use    Vaping status: Never Used   Substance Use Topics    Alcohol use: Yes     Alcohol/week: 1.0 standard drink of alcohol     Types: 1 Cans of beer per week     Comment: twice weekly    Drug use: No        Ruth Moyer DO  06/06/25 0916

## 2025-06-03 ENCOUNTER — APPOINTMENT (INPATIENT)
Dept: NON INVASIVE DIAGNOSTICS | Facility: HOSPITAL | Age: 60
DRG: 062 | End: 2025-06-03
Payer: COMMERCIAL

## 2025-06-03 ENCOUNTER — APPOINTMENT (INPATIENT)
Dept: RADIOLOGY | Facility: HOSPITAL | Age: 60
DRG: 062 | End: 2025-06-03
Payer: COMMERCIAL

## 2025-06-03 ENCOUNTER — TELEPHONE (OUTPATIENT)
Age: 60
End: 2025-06-03

## 2025-06-03 PROBLEM — I63.9 STROKE (HCC): Status: ACTIVE | Noted: 2025-06-02

## 2025-06-03 PROBLEM — N17.9 AKI (ACUTE KIDNEY INJURY) (HCC): Status: ACTIVE | Noted: 2025-06-03

## 2025-06-03 LAB
ALBUMIN SERPL BCG-MCNC: 3.9 G/DL (ref 3.5–5)
ALP SERPL-CCNC: 52 U/L (ref 34–104)
ALT SERPL W P-5'-P-CCNC: 32 U/L (ref 7–52)
ANION GAP SERPL CALCULATED.3IONS-SCNC: 9 MMOL/L (ref 4–13)
AORTIC ROOT: 3.4 CM
ASCENDING AORTA: 4.1 CM
AST SERPL W P-5'-P-CCNC: 20 U/L (ref 13–39)
ATRIAL RATE: 64 BPM
ATRIAL RATE: 77 BPM
BASOPHILS # BLD AUTO: 0.06 THOUSANDS/ÂΜL (ref 0–0.1)
BASOPHILS NFR BLD AUTO: 1 % (ref 0–1)
BILIRUB SERPL-MCNC: 1.03 MG/DL (ref 0.2–1)
BSA FOR ECHO PROCEDURE: 2.24 M2
BUN SERPL-MCNC: 20 MG/DL (ref 5–25)
CALCIUM SERPL-MCNC: 8.7 MG/DL (ref 8.4–10.2)
CHLORIDE SERPL-SCNC: 103 MMOL/L (ref 96–108)
CHOLEST SERPL-MCNC: 220 MG/DL (ref ?–200)
CO2 SERPL-SCNC: 27 MMOL/L (ref 21–32)
CREAT SERPL-MCNC: 0.97 MG/DL (ref 0.6–1.3)
DOP CALC LVOT AREA: 3.14 CM2
DOP CALC LVOT DIAMETER: 2 CM
E WAVE DECELERATION TIME: 205 MS
E/A RATIO: 1.09
EOSINOPHIL # BLD AUTO: 0.16 THOUSAND/ÂΜL (ref 0–0.61)
EOSINOPHIL NFR BLD AUTO: 1 % (ref 0–6)
ERYTHROCYTE [DISTWIDTH] IN BLOOD BY AUTOMATED COUNT: 13.1 % (ref 11.6–15.1)
EST. AVERAGE GLUCOSE BLD GHB EST-MCNC: 126 MG/DL
FRACTIONAL SHORTENING: 40 (ref 28–44)
GFR SERPL CREATININE-BSD FRML MDRD: 85 ML/MIN/1.73SQ M
GLUCOSE SERPL-MCNC: 104 MG/DL (ref 65–140)
HBA1C MFR BLD: 6 %
HCT VFR BLD AUTO: 48.9 % (ref 36.5–49.3)
HDLC SERPL-MCNC: 36 MG/DL
HGB BLD-MCNC: 16.3 G/DL (ref 12–17)
IMM GRANULOCYTES # BLD AUTO: 0.03 THOUSAND/UL (ref 0–0.2)
IMM GRANULOCYTES NFR BLD AUTO: 0 % (ref 0–2)
INTERVENTRICULAR SEPTUM IN DIASTOLE (PARASTERNAL SHORT AXIS VIEW): 1.3 CM
INTERVENTRICULAR SEPTUM: 1.3 CM (ref 0.6–1.1)
LAAS-AP2: 21.4 CM2
LAAS-AP4: 21.1 CM2
LDLC SERPL CALC-MCNC: 138 MG/DL (ref 0–100)
LEFT ATRIUM SIZE: 4 CM
LEFT ATRIUM VOLUME (MOD BIPLANE): 64 ML
LEFT ATRIUM VOLUME INDEX (MOD BIPLANE): 28.6 ML/M2
LEFT INTERNAL DIMENSION IN SYSTOLE: 2.6 CM (ref 2.1–4)
LEFT VENTRICULAR INTERNAL DIMENSION IN DIASTOLE: 4.3 CM (ref 3.5–6)
LEFT VENTRICULAR POSTERIOR WALL IN END DIASTOLE: 1.3 CM
LEFT VENTRICULAR STROKE VOLUME: 60 ML
LV EF US.2D.A4C+ESTIMATED: 64 %
LVSV (TEICH): 60 ML
LYMPHOCYTES # BLD AUTO: 2.63 THOUSANDS/ÂΜL (ref 0.6–4.47)
LYMPHOCYTES NFR BLD AUTO: 24 % (ref 14–44)
MAGNESIUM SERPL-MCNC: 1.7 MG/DL (ref 1.9–2.7)
MCH RBC QN AUTO: 29.7 PG (ref 26.8–34.3)
MCHC RBC AUTO-ENTMCNC: 33.3 G/DL (ref 31.4–37.4)
MCV RBC AUTO: 89 FL (ref 82–98)
MONOCYTES # BLD AUTO: 0.84 THOUSAND/ÂΜL (ref 0.17–1.22)
MONOCYTES NFR BLD AUTO: 8 % (ref 4–12)
MV E'TISSUE VEL-LAT: 14 CM/S
MV E'TISSUE VEL-SEP: 8 CM/S
MV PEAK A VEL: 0.77 M/S
MV PEAK E VEL: 84 CM/S
MV STENOSIS PRESSURE HALF TIME: 60 MS
MV VALVE AREA P 1/2 METHOD: 3.67
NEUTROPHILS # BLD AUTO: 7.38 THOUSANDS/ÂΜL (ref 1.85–7.62)
NEUTS SEG NFR BLD AUTO: 66 % (ref 43–75)
NRBC BLD AUTO-RTO: 0 /100 WBCS
P AXIS: 37 DEGREES
P AXIS: 48 DEGREES
PHOSPHATE SERPL-MCNC: 3.5 MG/DL (ref 2.7–4.5)
PLATELET # BLD AUTO: 195 THOUSANDS/UL (ref 149–390)
PMV BLD AUTO: 10 FL (ref 8.9–12.7)
POTASSIUM SERPL-SCNC: 3.7 MMOL/L (ref 3.5–5.3)
PR INTERVAL: 188 MS
PR INTERVAL: 192 MS
PROT SERPL-MCNC: 6.5 G/DL (ref 6.4–8.4)
QRS AXIS: -16 DEGREES
QRS AXIS: -22 DEGREES
QRSD INTERVAL: 92 MS
QRSD INTERVAL: 96 MS
QT INTERVAL: 388 MS
QT INTERVAL: 404 MS
QTC INTERVAL: 417 MS
QTC INTERVAL: 439 MS
RA PRESSURE ESTIMATED: 5 MMHG
RBC # BLD AUTO: 5.49 MILLION/UL (ref 3.88–5.62)
RIGHT ATRIAL 2D VOLUME: 45 ML
RIGHT ATRIUM AREA SYSTOLE A4C: 17.1 CM2
RIGHT VENTRICLE ID DIMENSION: 3.8 CM
RV PSP: 34 MMHG
SINOTUBULAR JUNCTION: 3.1 CM
SL CV LEFT ATRIUM LENGTH A2C: 5.6 CM
SL CV LV EF: 70
SL CV PED ECHO LEFT VENTRICLE DIASTOLIC VOLUME (MOD BIPLANE) 2D: 84 ML
SL CV PED ECHO LEFT VENTRICLE SYSTOLIC VOLUME (MOD BIPLANE) 2D: 24 ML
SL CV SINUS OF VALSALVA 2D: 3.7 CM
SODIUM SERPL-SCNC: 139 MMOL/L (ref 135–147)
STJ: 3.1 CM
T WAVE AXIS: -2 DEGREES
T WAVE AXIS: -4 DEGREES
TR MAX PG: 29 MMHG
TR PEAK VELOCITY: 2.7 M/S
TRICUSPID ANNULAR PLANE SYSTOLIC EXCURSION: 1.9 CM
TRICUSPID VALVE PEAK REGURGITATION VELOCITY: 2.67 M/S
TRIGL SERPL-MCNC: 228 MG/DL (ref ?–150)
TSH SERPL DL<=0.05 MIU/L-ACNC: 2.14 UIU/ML (ref 0.45–4.5)
VENTRICULAR RATE: 64 BPM
VENTRICULAR RATE: 77 BPM
WBC # BLD AUTO: 11.1 THOUSAND/UL (ref 4.31–10.16)

## 2025-06-03 PROCEDURE — 83735 ASSAY OF MAGNESIUM: CPT | Performed by: PHYSICIAN ASSISTANT

## 2025-06-03 PROCEDURE — 93306 TTE W/DOPPLER COMPLETE: CPT

## 2025-06-03 PROCEDURE — 97167 OT EVAL HIGH COMPLEX 60 MIN: CPT

## 2025-06-03 PROCEDURE — 84100 ASSAY OF PHOSPHORUS: CPT | Performed by: PHYSICIAN ASSISTANT

## 2025-06-03 PROCEDURE — 99223 1ST HOSP IP/OBS HIGH 75: CPT | Performed by: STUDENT IN AN ORGANIZED HEALTH CARE EDUCATION/TRAINING PROGRAM

## 2025-06-03 PROCEDURE — 83036 HEMOGLOBIN GLYCOSYLATED A1C: CPT | Performed by: PHYSICIAN ASSISTANT

## 2025-06-03 PROCEDURE — 80053 COMPREHEN METABOLIC PANEL: CPT | Performed by: PHYSICIAN ASSISTANT

## 2025-06-03 PROCEDURE — 99223 1ST HOSP IP/OBS HIGH 75: CPT | Performed by: PSYCHIATRY & NEUROLOGY

## 2025-06-03 PROCEDURE — 93010 ELECTROCARDIOGRAM REPORT: CPT | Performed by: INTERNAL MEDICINE

## 2025-06-03 PROCEDURE — 84443 ASSAY THYROID STIM HORMONE: CPT

## 2025-06-03 PROCEDURE — 99291 CRITICAL CARE FIRST HOUR: CPT | Performed by: EMERGENCY MEDICINE

## 2025-06-03 PROCEDURE — 93306 TTE W/DOPPLER COMPLETE: CPT | Performed by: INTERNAL MEDICINE

## 2025-06-03 PROCEDURE — 80061 LIPID PANEL: CPT | Performed by: PHYSICIAN ASSISTANT

## 2025-06-03 PROCEDURE — 97163 PT EVAL HIGH COMPLEX 45 MIN: CPT

## 2025-06-03 PROCEDURE — 85025 COMPLETE CBC W/AUTO DIFF WBC: CPT | Performed by: PHYSICIAN ASSISTANT

## 2025-06-03 PROCEDURE — 70450 CT HEAD/BRAIN W/O DYE: CPT

## 2025-06-03 RX ORDER — POTASSIUM CHLORIDE 14.9 MG/ML
20 INJECTION INTRAVENOUS
Status: DISPENSED | OUTPATIENT
Start: 2025-06-03 | End: 2025-06-03

## 2025-06-03 RX ORDER — ACETAMINOPHEN 325 MG/1
650 TABLET ORAL EVERY 6 HOURS PRN
Status: DISCONTINUED | OUTPATIENT
Start: 2025-06-04 | End: 2025-06-05 | Stop reason: HOSPADM

## 2025-06-03 RX ORDER — POTASSIUM CHLORIDE 1500 MG/1
20 TABLET, EXTENDED RELEASE ORAL ONCE
Status: COMPLETED | OUTPATIENT
Start: 2025-06-03 | End: 2025-06-03

## 2025-06-03 RX ORDER — PANTOPRAZOLE SODIUM 40 MG/1
40 TABLET, DELAYED RELEASE ORAL
Status: DISCONTINUED | OUTPATIENT
Start: 2025-06-03 | End: 2025-06-03

## 2025-06-03 RX ORDER — PANTOPRAZOLE SODIUM 40 MG/1
40 TABLET, DELAYED RELEASE ORAL
Status: DISCONTINUED | OUTPATIENT
Start: 2025-06-03 | End: 2025-06-05 | Stop reason: HOSPADM

## 2025-06-03 RX ORDER — BISOPROLOL FUMARATE 5 MG/1
20 TABLET, FILM COATED ORAL DAILY
Status: DISCONTINUED | OUTPATIENT
Start: 2025-06-03 | End: 2025-06-04

## 2025-06-03 RX ORDER — MAGNESIUM SULFATE HEPTAHYDRATE 40 MG/ML
2 INJECTION, SOLUTION INTRAVENOUS ONCE
Status: COMPLETED | OUTPATIENT
Start: 2025-06-03 | End: 2025-06-03

## 2025-06-03 RX ORDER — HYDROCHLOROTHIAZIDE 12.5 MG/1
12.5 TABLET ORAL DAILY
Status: DISCONTINUED | OUTPATIENT
Start: 2025-06-05 | End: 2025-06-05 | Stop reason: HOSPADM

## 2025-06-03 RX ORDER — ACETAMINOPHEN 10 MG/ML
1000 INJECTION, SOLUTION INTRAVENOUS EVERY 6 HOURS PRN
Status: DISPENSED | OUTPATIENT
Start: 2025-06-03 | End: 2025-06-04

## 2025-06-03 RX ADMIN — POTASSIUM CHLORIDE 20 MEQ: 14.9 INJECTION, SOLUTION INTRAVENOUS at 08:55

## 2025-06-03 RX ADMIN — MAGNESIUM SULFATE HEPTAHYDRATE 2 G: 40 INJECTION, SOLUTION INTRAVENOUS at 08:26

## 2025-06-03 RX ADMIN — ACETAMINOPHEN 1000 MG: 10 INJECTION INTRAVENOUS at 08:19

## 2025-06-03 RX ADMIN — POTASSIUM CHLORIDE 20 MEQ: 1500 TABLET, EXTENDED RELEASE ORAL at 10:55

## 2025-06-03 RX ADMIN — ATORVASTATIN CALCIUM 40 MG: 40 TABLET, FILM COATED ORAL at 17:41

## 2025-06-03 RX ADMIN — PANTOPRAZOLE SODIUM 40 MG: 40 TABLET, DELAYED RELEASE ORAL at 10:54

## 2025-06-03 RX ADMIN — ACETAMINOPHEN 1000 MG: 10 INJECTION INTRAVENOUS at 19:20

## 2025-06-03 RX ADMIN — ACETAMINOPHEN 1000 MG: 10 INJECTION INTRAVENOUS at 01:43

## 2025-06-03 NOTE — CONSULTS
PHYSICAL MEDICINE AND REHABILITATION CONSULT NOTE  Otis Robledo 59 y.o. male MRN: [id number]  Unit/Bed#: ICU 13 Encounter: [id number]    Requested by (Physician/Service): Milo Hunter MD  Reason for Consultation:  Assessment of rehabilitation needs        Assessment & Plan  Stroke (HCC)  Patient presented with aphasia, right facial droop, and sensory deficits.   CTH and CTH without intracranial abnormality.   S/P TNK with improvement   MRI with single small acute cortical infarction involving the left frontal lobe premotor cortex region.   PT/OT are pending however patient reports being back to baseline and has no focal neurological deficits. He will likely be able to d/c to home once medically stable.   Obstructive sleep apnea    Benign essential hypertension    Dyslipidemia    Esophagitis, reflux    Paroxysmal atrial fibrillation ([facility])    NUZHAT (acute kidney injury) ([facility])        Thank you for this consultation.  Do not hesitate to contact service with further questions.      ANNA Chang  PM&R    I have spent a total time of 30 minutes on 06/03/25 in caring for this patient including Patient and family education, Counseling / Coordination of care, Documenting in the medical record, Reviewing/placing orders in the medical record (including tests, medications, and/or procedures), Obtaining or reviewing history  , and Communicating with other healthcare professionals .    History of Present Illness:  Otis Robledo is a 59 y.o. male with a PMH of hypertension, hyperlipidemia, atrial fibrillation who presented to the [facility] on 6/2/2025 with aphasia, right sided facial droop and bilateral sensroy deficits. CT showed no acute abnormalities. He was given TNK with improvement in symptoms. MRI brain showed single small cortical infarction involving the left frontal lobe premotor cortex region. He does have a history of atrial fibrillation but is not on AC. PM&R are consulted for  rehabilitation recommendations.    The patient was seen in his room with his spouse at bedside. Per spouse and patient he is back to baseline function. Speech is also much improved. He denies any blurred vision, double vision, SOB, chest pain, numbness and tingling.     Review of Systems: 10 point ROS negative except for what is noted in HPI    Function:  Prior level of function and living situation: The patient lives in a two level home with 2-3 ANDRES with his spouse and children. He was independent.       Current level of function:  Physical Therapy: Pending   Occupational Therapy: Pending       Physical Exam:  /96 (BP Location: Right arm)   Pulse 70   Temp 97.7 °F (36.5 °C) (Oral)   Resp 22   Ht 6' (1.829 m)   Wt 102 kg (225 lb)   SpO2 94%   BMI 30.52 kg/m²        Intake/Output Summary (Last 24 hours) at 6/3/2025 1104  Last data filed at 6/3/2025 1026  Gross per 24 hour   Intake 1713.33 ml   Output 1200 ml   Net 513.33 ml       Body mass index is 30.52 kg/m².      Physical Exam  Constitutional:       General: He is not in acute distress.     Appearance: He is not toxic-appearing.   HENT:      Head: Normocephalic and atraumatic.     Eyes:      Extraocular Movements: Extraocular movements intact.     Pulmonary:      Effort: Pulmonary effort is normal. No respiratory distress.   Abdominal:      General: There is no distension.     Musculoskeletal:         General: Normal range of motion.     Skin:     General: Skin is warm and dry.     Neurological:      Mental Status: He is alert and oriented to person, place, and time.     Psychiatric:         Mood and Affect: Mood normal.       Social History:    Social History[1]     Family History:    Family History[2]      Medications:   Current Medications[3]    Past Medical History:     Past Medical History[4]     Past Surgical History:     Past Surgical History[5]      Allergies:     Allergies   Allergen Reactions    Bactrim [Sulfamethoxazole-Trimethoprim]  Diarrhea     vomiting    Pravastatin Palpitations           LABORATORY RESULTS:      Lab Results   Component Value Date    HGB 16.3 06/03/2025    HGB 16.9 02/07/2017    HCT 48.9 06/03/2025    HCT 47.9 02/07/2017    WBC 11.10 (H) 06/03/2025    WBC 6.6 02/07/2017     Lab Results   Component Value Date    BUN 20 06/03/2025    BUN 24 02/14/2025     02/07/2017    K 3.7 06/03/2025    K 4.3 02/14/2025     06/03/2025     02/14/2025    GLUCOSE 98 02/07/2017    CREATININE 0.97 06/03/2025    CREATININE 0.93 02/07/2017     Lab Results   Component Value Date    PROTIME 13.0 06/02/2025    INR 0.95 06/02/2025        DIAGNOSTIC STUDIES: Reviewed  MRI brain wo contrast  Result Date: 6/3/2025  Impression: Single small acute cortical infarction involving the left frontal lobe premotor cortex region. The study was marked in EPIC for immediate notification. Workstation performed: [id number]     CTA stroke alert (head/neck)  Result Date: 6/2/2025  Impression: No acute process on CTA neck and brain. Findings were directly discussed with Dr. Eason at 7:32 p.m. on 6/2/2025. Workstation performed: [id number]     CT stroke alert brain  Result Date: 6/2/2025  Impression: No acute intracranial abnormality. Findings were directly discussed with Dr. Wilson at approximately 7:21 p.m. on 6/2/2025. Workstation performed: [id number]          [1]   Social History  Socioeconomic History    Marital status: /Civil Union   Tobacco Use    Smoking status: Never    Smokeless tobacco: Never   Vaping Use    Vaping status: Never Used   Substance and Sexual Activity    Alcohol use: Yes     Alcohol/week: 1.0 standard drink of alcohol     Types: 1 Cans of beer per week     Comment: twice weekly    Drug use: No   [2]   Family History  Problem Relation Name Age of Onset    Depression Son     [3]   Current Facility-Administered Medications:     acetaminophen (Ofirmev) injection 1,000 mg, 1,000 mg, Intravenous, Q6H PRN, Stopped at 06/03/25 1000  **FOLLOWED BY** [START ON 6/4/2025] acetaminophen (TYLENOL) tablet 650 mg, 650 mg, Oral, Q6H PRN, Obdulio Ball PA-C    atorvastatin (LIPITOR) tablet 40 mg, 40 mg, Oral, QPM, Ruby Alicia DO    [Held by provider] bisoprolol (ZEBETA) tablet 20 mg, 20 mg, Oral, Daily **AND** [Held by provider] hydroCHLOROthiazide tablet 12.5 mg, 12.5 mg, Oral, Daily, Obdulio Ball PA-C    labetalol (NORMODYNE) injection 10 mg, 10 mg, Intravenous, Q4H PRN, Woody Eason DO    ondansetron (ZOFRAN) injection 4 mg, 4 mg, Intravenous, Q6H PRN, Obdulio Ball PA-C    pantoprazole (PROTONIX) EC tablet 40 mg, 40 mg, Oral, Early Morning, Ruby Alicia DO, 40 mg at 06/03/25 1054    potassium chloride 20 mEq IVPB (premix), 20 mEq, Intravenous, Q2H, Nevaeh Evans PA-C, Stopped at 06/03/25 0856    sodium chloride 0.9 % infusion, 100 mL/hr, Intravenous, Continuous, Obdulio Ball PA-C, Last Rate: 100 mL/hr at 06/02/25 2043, 100 mL/hr at 06/02/25 2043  [4]   Past Medical History:  Diagnosis Date    Arthritis     Depression     Extrinsic asthma 08/15/2008    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

## 2025-06-03 NOTE — PLAN OF CARE
Problem: PAIN - ADULT  Goal: Verbalizes/displays adequate comfort level or baseline comfort level  Description: Interventions:  - Encourage patient to monitor pain and request assistance  - Assess pain using appropriate pain scale  - Administer analgesics as ordered based on type and severity of pain and evaluate response  - Implement non-pharmacological measures as appropriate and evaluate response  - Consider cultural and social influences on pain and pain management  - Notify physician/advanced practitioner if interventions unsuccessful or patient reports new pain  - Educate patient/family on pain management process including their role and importance of  reporting pain   - Provide non-pharmacologic/complimentary pain relief interventions  Outcome: Progressing     Problem: INFECTION - ADULT  Goal: Absence or prevention of progression during hospitalization  Description: INTERVENTIONS:  - Assess and monitor for signs and symptoms of infection  - Monitor lab/diagnostic results  - Monitor all insertion sites, i.e. indwelling lines, tubes, and drains  - Monitor endotracheal if appropriate and nasal secretions for changes in amount and color  - [place] appropriate cooling/warming therapies per order  - Administer medications as ordered  - Instruct and encourage patient and family to use good hand hygiene technique  - Identify and instruct in appropriate isolation precautions for identified infection/condition  Outcome: Progressing  Goal: Absence of fever/infection during neutropenic period  Description: INTERVENTIONS:  - Monitor WBC  - Perform strict hand hygiene  - Limit to healthy visitors only  - No plants, dried, fresh or silk flowers with turk in patient room  Outcome: Progressing     Problem: SAFETY ADULT  Goal: Patient will remain free of falls  Description: INTERVENTIONS:  - Educate patient/family on patient safety including physical limitations  - Instruct patient to call for assistance with activity   -  Consider consulting OT/PT to assist with strengthening/mobility based on AM PAC & JH-HLM score  - Consult OT/PT to assist with strengthening/mobility   - Keep Call bell within reach  - Keep bed low and locked with side rails adjusted as appropriate  - Keep care items and personal belongings within reach  - Initiate and maintain comfort rounds  - Make Fall Risk Sign visible to staff  - Offer Toileting every 2 Hours, in advance of need  - Initiate/Maintain bed/chair alarm  - Obtain necessary fall risk management equipment  - Apply yellow socks and bracelet for high fall risk patients  - Consider moving patient to room near nurses station  Outcome: Progressing  Goal: Maintain or return to baseline ADL function  Description: INTERVENTIONS:  -  Assess patient's ability to carry out ADLs; assess patient's baseline for ADL function and identify physical deficits which impact ability to perform ADLs (bathing, care of mouth/teeth, toileting, grooming, dressing, etc.)  - Assess/evaluate cause of self-care deficits   - Assess range of motion  - Assess patient's mobility; develop plan if impaired  - Assess patient's need for assistive devices and provide as appropriate  - Encourage maximum independence but intervene and supervise when necessary  - Involve family in performance of ADLs  - Assess for home care needs following discharge   - Consider OT consult to assist with ADL evaluation and planning for discharge  - Provide patient education as appropriate  - Monitor functional capacity and physical performance, use of AM PAC & JH-HLM   - Monitor gait, balance and fatigue with ambulation    Outcome: Progressing  Goal: Maintains/Returns to pre admission functional level  Description: INTERVENTIONS:  - Perform AM-PAC 6 Click Basic Mobility/ Daily Activity assessment daily.  - Set and communicate daily mobility goal to care team and patient/family/caregiver.   - Collaborate with rehabilitation services on mobility goals if  consulted  - Perform Range of Motion 3 times a day.  - Reposition patient every 2 hours.  - Dangle patient 3 times a day  - Stand patient 3 times a day  - Ambulate patient 3 times a day  - Out of bed to chair 3 times a day   - Out of bed for meals 3 times a day  - Out of bed for toileting  - Record patient progress and toleration of activity level   Outcome: Progressing     Problem: DISCHARGE PLANNING  Goal: Discharge to home or other facility with appropriate resources  Description: INTERVENTIONS:  - Identify barriers to discharge w/patient and caregiver  - Arrange for needed discharge resources and transportation as appropriate  - Identify discharge learning needs (meds, wound care, etc.)  - Arrange for interpretive services to assist at discharge as needed  - Refer to Case Management Department for coordinating discharge planning if the patient needs post-hospital services based on physician/advanced practitioner order or complex needs related to functional status, cognitive ability, or social support system  Outcome: Progressing     Problem: Knowledge Deficit  Goal: Patient/family/caregiver demonstrates understanding of disease process, treatment plan, medications, and discharge instructions  Description: Complete learning assessment and assess knowledge base.  Interventions:  - Provide teaching at level of understanding  - Provide teaching via preferred learning methods  Outcome: Progressing     Problem: Neurological Deficit  Goal: Neurological status is stable or improving  Description: Interventions:  - Monitor and assess patient's level of consciousness, motor function, sensory function, and level of assistance needed for ADLs.   - Monitor and report changes from baseline. Collaborate with interdisciplinary team to initiate plan and implement interventions as ordered.   - Provide and maintain a safe environment.  - Consider seizure precautions.  - Consider fall precautions.  - Consider aspiration  precautions.  - Consider bleeding precautions.  Outcome: Progressing     Problem: Activity Intolerance/Impaired Mobility  Goal: Mobility/activity is maintained at optimum level for patient  Description: Interventions:  - Assess and monitor patient  barriers to mobility and need for assistive/adaptive devices.  - Assess patient's emotional response to limitations.  - Collaborate with interdisciplinary team and initiate plans and interventions as ordered.  - Encourage independent activity per ability.  - Maintain proper body alignment.  - Perform active/passive rom as tolerated/ordered.  - Plan activities to conserve energy.  - Turn patient as appropriate  Outcome: Progressing     Problem: Communication Impairment  Goal: Ability to express needs and understand communication  Description: Assess patient's communication skills and ability to understand information.  Patient will demonstrate use of effective communication techniques, alternative methods of communication and understanding even if not able to speak.     - Encourage communication and provide alternate methods of communication as needed.  - Collaborate with case management/ for discharge needs.  - Include patient/family/caregiver in decisions related to communication.  Outcome: Progressing     Problem: Potential for Aspiration  Goal: Non-ventilated patient's risk of aspiration is minimized  Description: Assess and monitor vital signs, respiratory status, and labs (WBC).  Monitor for signs of aspiration (tachypnea, cough, rales, wheezing, cyanosis, fever).    - Assess and monitor patient's ability to swallow.  - Place patient up in chair to eat if possible.  - HOB up at 90 degrees to eat if unable to get patient up into chair.  - Supervise patient during oral intake.   - Instruct patient/ family to take small bites.  - Instruct patient/ family to take small single sips when taking liquids.  - Follow patient-specific strategies generated by  speech pathologist.  Outcome: Progressing  Goal: Ventilated patient's risk of aspiration is minimized  Description: Assess and monitor vital signs, respiratory status, airway cuff pressure, and labs (WBC).  Monitor for signs of aspiration (tachypnea, cough, rales, wheezing, cyanosis, fever).    - Elevate head of bed 30 degrees if patient has tube feeding.  - Monitor tube feeding.  Outcome: Progressing     Problem: Nutrition  Goal: Nutrition/Hydration status is improving  Description: Monitor and assess patient's nutrition/hydration status for malnutrition (ex- brittle hair, bruises, dry skin, pale skin and conjunctiva, muscle wasting, smooth red tongue, and disorientation). Collaborate with interdisciplinary team and initiate plan and interventions as ordered.  Monitor patient's weight and dietary intake as ordered or per policy. Utilize nutrition screening tool and intervene per policy. Determine patient's food preferences and provide high-protein, high-caloric foods as appropriate.     - Assist patient with eating.  - Allow adequate time for meals.  - Encourage patient to take dietary supplement as ordered.  - Collaborate with clinical nutritionist.  - Include patient/family/caregiver in decisions related to nutrition.  Outcome: Progressing

## 2025-06-03 NOTE — PLAN OF CARE
Problem: PHYSICAL THERAPY ADULT  Goal: Performs mobility at highest level of function for planned discharge setting.  See evaluation for individualized goals.  Description: Treatment/Interventions: LE strengthening/ROM, Functional transfer training, Therapeutic exercise, Endurance training, Patient/family training, Spoke to nursing, OT          See flowsheet documentation for full assessment, interventions and recommendations.  Note: Prognosis: Good  Problem List: Decreased safety awareness, Impaired judgement, Decreased cognition, Decreased mobility  Assessment: Pt is a 59 y.o. male admitted to [facility] on 6/[facility] due to Stroke ([facility]). PT consulted for functional mobility status assessment and discharge recommendations. PTA, pt was independent with ADLs/IADLs, + , employed full time, and independent with mobility and no AD use. Pt agitated upon entry and with history questions, able to obtain information and complete mobility with extensive education and redirection. Pt currently requiring supervision for transfers and Black for gait secondary to impulsivity, agitation, and decreased safety awareness. Pt able to ambulate household distances with an acceptable gait pattern and no LOB. Further mobility including stair trials deferred due to safety concerns secondary to pt impulsivity, agitation, decreased safety awareness, and decreased willingness to participate. Following session, PT positioned in bedside recliner with chair alarm and call bell in reach. Pt is currently functioning below and will benefit from skilled PT intervention throughout hospital stay to maximize functional mobility for improved safety upon d/c.  From PT/mobility standpoint, recommendation at time of d/c would be level 3 pending progress in order to facilitate return to PLOF. The patient's AM-PAC Basic Mobility Inpatient Short Form Raw Score is  . A Raw score of greater than or equal to 16 suggests the patient may benefit  from discharge to home. Please also refer to the recommendation of the Physical Therapist for safe discharge planning.  Barriers to Discharge: Other (Comment) (Concerns for safety when unsupervised due to impulsivity and decreased safety awareness.)     Rehab Resource Intensity Level, PT: III (Minimum Resource Intensity)    See flowsheet documentation for full assessment.

## 2025-06-03 NOTE — UTILIZATION REVIEW
Initial Clinical Review    Admission: Date/Time/Statement:   Admission Orders (From admission, onward)       Ordered        06/02/25 2038  Inpatient Admission  Once                          Orders Placed This Encounter   Procedures    Inpatient Admission     Standing Status:   Standing     Number of Occurrences:   1     Level of Care:   Level 1 Stepdown [13]     Estimated length of stay:   More than 2 Midnights     Certification:   I certify that inpatient services are medically necessary for this patient for a duration of greater than two midnights. See H&P and MD Progress Notes for additional information about the patient's course of treatment.     ED Arrival Information       Expected   -    Arrival   6/2/2025 19:04    Acuity   Immediate              Means of arrival   Walk-In    Escorted by   Self    Service   Critical Care/ICU    Admission type   Emergency              Arrival complaint   Stroke like symptoms             Chief Complaint   Patient presents with    STROKE Alert     LKW 30 min pta 06:40pm. Neglecting R side with aphashia       Initial Presentation: 59 y.o. male to ED as walk in presents w Son w acute onset Right facial droop , aphasia w bilateral sensory deficits ( cannot sense being touched) . Patient had onset of symptoms 30 minutes prior to arrival at 6:40 PM. Patient unable to provide any history or review of systems because of his receptive and expressive aphasia.   PMH paroxysmal AFib not on AC, HTN, HLD  EXAM  hypertensive, initial NIHSS 9.   Given IV Labetalol w SBP response to 170s, given TNK @ 2025 per Neuro consult recs. Labs NUZHAT (CR baseline 1.0)  PLAN  Inpatient SD1 ICU admission due to acute stroke, NUZHAT. Tele, ECHO, recs per Neuro consult Q 1HR neuro checks, SBP goal < 180, MAP > 65. CTH 24 hr post TNK; atorvastatin 40 daily, MRI brain, lipid panel, A1c,  hold home antiHTN agents for now, NPO, cont home PPI, trend BUN/CR/ CBC, I/O, PT/OT / SPEECH eval evals;   Date: 6/3/2025   Day 2:  ICU   Improving neuro deficits, aphasia resolved 6/2 CTH & CTA no acute intracranial abn/ no acute process  Pending CTH post TNK w/ TNK time 2025, MRI brain, ECHO. Freq neuro checks, Lipitor when tolerating PO, NPO pending SPEECH  SPEECH Eval  Passed RN Dysphagia assessment; tolerating reg diet w thin liquids. No signss of aspiration; SPEECH/Language deficits denied; Formal SPEECH / swallow eval does not appear indicated    ED Treatment-Medication Administration from 06/02/2025 1904 to 06/02/2025 2116         Date/Time Order Dose Route Action     06/02/2025 1924 labetalol (NORMODYNE) injection 10 mg 10 mg Intravenous Given     06/02/2025 1923 iohexol (OMNIPAQUE) 350 MG/ML injection (SINGLE-DOSE) 85 mL 85 mL Intravenous Given     06/02/2025 1936 tenecteplase (TNKase) injection 25 mg 25 mg Intravenous Given     06/02/2025 2021 sodium chloride 0.9 % infusion 100 mL/hr Intravenous New Bag     06/02/2025 2021 potassium chloride 20 mEq IVPB (premix) 20 mEq Intravenous New Bag     06/02/2025 2045 atorvastatin (LIPITOR) tablet 40 mg -- Oral Held Dose     06/02/2025 2043 sodium chloride 0.9 % infusion 100 mL/hr Intravenous New Bag            Scheduled Medications:  [Held by provider] atorvastatin, 40 mg, Oral, QPM  [Held by provider] bisoprolol, 20 mg, Oral, Daily   And  [Held by provider] hydroCHLOROthiazide, 12.5 mg, Oral, Daily  [Held by provider] pantoprazole, 40 mg, Oral, Early Morning  potassium chloride, 20 mEq, Oral, Once  potassium chloride, 20 mEq, Intravenous, Q2H      Continuous IV Infusions:  sodium chloride, 100 mL/hr, Intravenous, Continuous      PRN Meds:  acetaminophen, 1,000 mg, Intravenous, Q6H PRN   Followed by  [START ON 6/4/2025] acetaminophen, 650 mg, Oral, Q6H PRN  labetalol, 10 mg, Intravenous, Q4H PRN  ondansetron, 4 mg, Intravenous, Q6H PRN      ED Triage Vitals   Temperature Pulse Respirations Blood Pressure SpO2 Pain Score   06/02/25 1936 06/02/25 1910 06/02/25 1910 06/02/25 1910 06/02/25 1910  06/02/25 2130   97.8 °F (36.6 °C) 91 20 (!) 217/101 95 % 2     Weight (last 2 days)       Date/Time Weight    06/02/25 1906 102 (225.09)            Vital Signs (last 3 days)       Date/Time Temp Pulse Resp BP MAP (mmHg) SpO2 O2 Device Patient Position - Orthostatic VS [place] Coma Scale Score Pain    06/03/25 1000 -- 70 22 165/96 123 94 % None (Room air) Sitting 15 --    06/03/25 0900 -- 62 16 137/86 107 94 % None (Room air) Sitting 15 --    06/03/25 0800 97.7 °F (36.5 °C) 72 31 148/96 116 92 % None (Room air) Lying 15 --    06/03/25 0700 -- 60 13 119/77 93 94 % -- -- 15 --    06/03/25 0600 -- 58 13 120/73 88 94 % -- -- 15 --    06/03/25 0500 -- 64 62 -- -- 95 % -- -- 15 --    06/03/25 0439 -- 62 15 131/79 97 93 % -- -- -- --    06/03/25 0415 -- 68 59 158/80 107 93 % -- -- -- --    06/03/25 0400 -- -- -- -- -- -- -- -- 15 --    06/03/25 0315 -- 70 29 160/85 110 96 % -- -- -- --    06/03/25 0300 -- -- -- -- -- -- -- -- 15 --    06/03/25 0245 -- 72 17 140/86 107 93 % -- -- -- --    06/03/25 0230 -- -- -- -- -- -- -- -- 15 --    06/03/25 0215 -- 72 18 143/79 99 94 % -- -- -- --    06/03/25 0200 -- -- -- -- -- -- -- -- 15 --    06/03/25 0145 -- 70 16 134/77 95 97 % -- -- -- --    06/03/25 0130 -- -- -- -- -- -- -- -- 15 --    06/03/25 0115 -- 70 21 143/78 101 95 % -- -- -- --    06/03/25 0100 -- -- -- -- -- -- -- -- 15 --    06/03/25 0045 -- 74 30 151/92 112 97 % -- -- -- --    06/03/25 0030 -- -- -- -- -- -- -- -- 15 --    06/03/25 0000 -- -- -- -- -- -- -- -- 15 --    06/02/25 2345 -- 76 21 162/94 122 97 % -- -- -- --    06/02/25 2330 -- 68 41 151/89 117 98 % -- -- 15 --    06/02/25 2315 -- 72 67 143/84 105 98 % -- -- -- --    06/02/25 2300 -- 70 24 146/87 108 97 % -- -- 15 --    06/02/25 2245 -- 70 22 163/96 121 97 % -- -- -- --    06/02/25 2230 -- 70 24 159/99 121 97 % -- -- 15 --    06/02/25 2215 -- 70 32 158/96 127 95 % -- -- -- --    06/02/25 2200 -- 74 40 154/82 118 96 % -- -- 15 --    06/02/25 2145 -- 72 23  165/95 121 96 % -- -- 15 --    06/02/25 2130 -- 72 22 157/87 112 98 % -- -- 15 2    06/02/25 2100 -- 76 107 175/107 132 97 % None (Room air) Lying 15 --    06/02/25 2045 -- 76 22 175/107 132 97 % None (Room air) Lying 15 --    06/02/25 2030 -- 70 18 165/102 -- -- -- Lying 15 --    06/02/25 2015 -- 77 18 165/106 -- 96 % None (Room air) -- 15 --    06/02/25 2000 -- 78 18 174/96 -- 96 % None (Room air) -- 15 --    06/02/25 1945 -- 88 18 169/90 -- 99 % None (Room air) -- 14 --    06/02/25 1936 97.8 °F (36.6 °C) 85 18 175/83 -- 96 % -- -- -- --    06/02/25 1930 -- 88 100 166/83 117 94 % -- -- -- --    06/02/25 19:18:32 -- 87 18 206/95 -- 96 % None (Room air) -- 10 --    06/02/25 19:17:20 -- -- -- -- -- 95 % -- -- -- --    06/02/25 1910 -- 91 20 217/101 -- 95 % None (Room air) -- 10 --              Pertinent Labs/Diagnostic Test Results:   Radiology:  MRI brain wo contrast   Final Interpretation by Chirag Stafford MD (06/03 0548)      Single small acute cortical infarction involving the left frontal lobe premotor cortex region.      The study was marked in EPIC for immediate notification.      Workstation performed: [id number]         CTA stroke alert (head/neck)   Final Interpretation by Johnny Fitch MD (06/02 1936)      No acute process on CTA neck and brain.               Findings were directly discussed with Dr. Eason at 7:32 p.m. on 6/2/2025.      Workstation performed: [id number]         CT stroke alert brain   Final Interpretation by Johnny Fitch MD (06/02 1922)      No acute intracranial abnormality.      Findings were directly discussed with Dr. Wilson at approximately 7:21 p.m. on 6/2/2025.      Workstation performed: [id number]         CT head wo contrast    (Results Pending)     Cardiology:  ECG 12 lead   Final Result by Rafal Francis MD (06/03 1013)   Normal sinus rhythm   Voltage criteria for left ventricular hypertrophy   Abnormal ECG   When compared with ECG of 02-Jun-2025 19:59,  "(unconfirmed)   No significant change was found   Confirmed by Rafal Francis ([id number]) on 6/3/2025 10:13:37 AM      ECG 12 lead   Final Result by Rafal Francis MD (06/03 1013)   Normal sinus rhythm   Voltage criteria for left ventricular hypertrophy   Abnormal ECG   When compared with ECG of 04-Aug-2020 17:59,   Inverted T waves have replaced nonspecific T wave abnormality in Inferior    leads   Confirmed by Rafal Francis ([id number]) on 6/3/2025 10:13:34 AM        GI:  No orders to display           Results from last 7 days   Lab Units 06/03/25 0511 06/02/25 1914   WBC Thousand/uL 11.10* 14.95*   HEMOGLOBIN g/dL 16.3 17.9*   HEMATOCRIT % 48.9 53.8*   PLATELETS Thousands/uL 195 274   TOTAL NEUT ABS Thousands/µL 7.38  --          Results from last 7 days   Lab Units 06/03/25 0511 06/02/25 1914   SODIUM mmol/L 139 139   POTASSIUM mmol/L 3.7 3.1*   CHLORIDE mmol/L 103 101   CO2 mmol/L 27 25   ANION GAP mmol/L 9 13   BUN mg/dL 20 25   CREATININE mg/dL 0.97 1.48*   EGFR ml/min/1.73sq m 85 51   CALCIUM mg/dL 8.7 9.8   MAGNESIUM mg/dL 1.7*  --    PHOSPHORUS mg/dL 3.5  --      Results from last 7 days   Lab Units 06/03/25 0511   AST U/L 20   ALT U/L 32   ALK PHOS U/L 52   TOTAL PROTEIN g/dL 6.5   ALBUMIN g/dL 3.9   TOTAL BILIRUBIN mg/dL 1.03*     Results from last 7 days   Lab Units 06/02/25 1913   POC GLUCOSE mg/dl 117     Results from last 7 days   Lab Units 06/03/25 0511 06/02/25 1914   GLUCOSE RANDOM mg/dL 104 140         Results from last 7 days   Lab Units 06/03/25 0511   HEMOGLOBIN A1C % 6.0*   EAG mg/dl 126     No results found for: \"BETA-HYDROXYBUTYRATE\"                   Results from last 7 days   Lab Units 06/02/25 2320 06/02/25 2059 06/02/25 1914   HS TNI 0HR ng/L  --   --  5   HS TNI 2HR ng/L  --  6  --    HSTNI D2 ng/L  --  1  --    HS TNI 4HR ng/L 9  --   --    HSTNI D4 ng/L 4  --   --          Results from last 7 days   Lab Units 06/02/25  1914   PROTIME seconds 13.0   INR  0.95   PTT seconds " 25                                                                                                               Past Medical History[1]  Present on Admission:   Obstructive sleep apnea   Benign essential hypertension   Dyslipidemia   Esophagitis, reflux   Paroxysmal atrial fibrillation (HCC)      Admitting Diagnosis: Stroke (HCC) [I63.9]  Stroke-like symptom [R29.90]  Age/Sex: 59 y.o. male    Network Utilization Review Department  ATTENTION: Please call with any questions or concerns to [phone] and carefully listen to the prompts so that you are directed to the right person. All voicemails are confidential.   For Discharge needs, contact Care Management DC Support Team at [phone] opt. 2  Send all requests for admission clinical reviews, approved or denied determinations and any other requests to dedicated fax number below belonging to the campus where the patient is receiving treatment. List of dedicated fax numbers for the Facilities:  FACILITY NAME UR FAX NUMBER   ADMISSION DENIALS (Administrative/Medical Necessity) [phone]   DISCHARGE SUPPORT TEAM (NETWORK) [phone]   PARENT CHILD HEALTH (Maternity/NICU/Pediatrics) [phone]   [facility] [phone]   General acute hospital [phone]   [facility] [phone]   [facility] [phone]   [facility] [phone]   [facility] [phone]   [facility] [phone]   [facility] [phone]   [facility] [phone]   [facility] [phone]   [facility] [phone]   [facility] [phone]              [1]   Past Medical History:  Diagnosis Date    Arthritis     Depression     Extrinsic asthma 08/15/2008     Exudative pharyngitis 05/20/2020    GERD (gastroesophageal reflux disease)     Headache(784.0)     Hypertension     Visual impairment

## 2025-06-03 NOTE — ASSESSMENT & PLAN NOTE
Patient presented with aphasia, right facial droop, and sensory deficits.   CTH and CTH without intracranial abnormality.   S/P TNK with improvement   MRI with single small acute cortical infarction involving the left frontal lobe premotor cortex region.   PT/OT are pending however patient reports being back to baseline and has no focal neurological deficits. He will likely be able to d/c to home once medically stable.

## 2025-06-03 NOTE — PLAN OF CARE
Problem: PAIN - ADULT  Goal: Verbalizes/displays adequate comfort level or baseline comfort level  Description: Interventions:  - Encourage patient to monitor pain and request assistance  - Assess pain using appropriate pain scale  - Administer analgesics as ordered based on type and severity of pain and evaluate response  - Implement non-pharmacological measures as appropriate and evaluate response  - Consider cultural and social influences on pain and pain management  - Notify physician/advanced practitioner if interventions unsuccessful or patient reports new pain  - Educate patient/family on pain management process including their role and importance of  reporting pain   - Provide non-pharmacologic/complimentary pain relief interventions  Outcome: Progressing     Problem: INFECTION - ADULT  Goal: Absence or prevention of progression during hospitalization  Description: INTERVENTIONS:  - Assess and monitor for signs and symptoms of infection  - Monitor lab/diagnostic results  - Monitor all insertion sites, i.e. indwelling lines, tubes, and drains  - Monitor endotracheal if appropriate and nasal secretions for changes in amount and color  - [place] appropriate cooling/warming therapies per order  - Administer medications as ordered  - Instruct and encourage patient and family to use good hand hygiene technique  - Identify and instruct in appropriate isolation precautions for identified infection/condition  Outcome: Progressing  Goal: Absence of fever/infection during neutropenic period  Description: INTERVENTIONS:  - Monitor WBC  - Perform strict hand hygiene  - Limit to healthy visitors only  - No plants, dried, fresh or silk flowers with turk in patient room  Outcome: Progressing     Problem: SAFETY ADULT  Goal: Patient will remain free of falls  Description: INTERVENTIONS:  - Educate patient/family on patient safety including physical limitations  - Instruct patient to call for assistance with activity   -  Consider consulting OT/PT to assist with strengthening/mobility based on AM PAC & JH-HLM score  - Consult OT/PT to assist with strengthening/mobility   - Keep Call bell within reach  - Keep bed low and locked with side rails adjusted as appropriate  - Keep care items and personal belongings within reach  - Initiate and maintain comfort rounds  - Make Fall Risk Sign visible to staff  - Offer Toileting every 2 Hours, in advance of need  - Initiate/Maintain bed alarm  - Obtain necessary fall risk management equipment  - Apply yellow socks and bracelet for high fall risk patients  - Consider moving patient to room near nurses station  Outcome: Progressing  Goal: Maintain or return to baseline ADL function  Description: INTERVENTIONS:  -  Assess patient's ability to carry out ADLs; assess patient's baseline for ADL function and identify physical deficits which impact ability to perform ADLs (bathing, care of mouth/teeth, toileting, grooming, dressing, etc.)  - Assess/evaluate cause of self-care deficits   - Assess range of motion  - Assess patient's mobility; develop plan if impaired  - Assess patient's need for assistive devices and provide as appropriate  - Encourage maximum independence but intervene and supervise when necessary  - Involve family in performance of ADLs  - Assess for home care needs following discharge   - Consider OT consult to assist with ADL evaluation and planning for discharge  - Provide patient education as appropriate  - Monitor functional capacity and physical performance, use of AM PAC & JH-HLM   - Monitor gait, balance and fatigue with ambulation    Outcome: Progressing  Goal: Maintains/Returns to pre admission functional level  Description: INTERVENTIONS:  - Perform AM-PAC 6 Click Basic Mobility/ Daily Activity assessment daily.  - Set and communicate daily mobility goal to care team and patient/family/caregiver.   - Collaborate with rehabilitation services on mobility goals if consulted  -  Out of bed for toileting  - Record patient progress and toleration of activity level   Outcome: Progressing     Problem: DISCHARGE PLANNING  Goal: Discharge to home or other facility with appropriate resources  Description: INTERVENTIONS:  - Identify barriers to discharge w/patient and caregiver  - Arrange for needed discharge resources and transportation as appropriate  - Identify discharge learning needs (meds, wound care, etc.)  - Arrange for interpretive services to assist at discharge as needed  - Refer to Case Management Department for coordinating discharge planning if the patient needs post-hospital services based on physician/advanced practitioner order or complex needs related to functional status, cognitive ability, or social support system  Outcome: Progressing     Problem: Knowledge Deficit  Goal: Patient/family/caregiver demonstrates understanding of disease process, treatment plan, medications, and discharge instructions  Description: Complete learning assessment and assess knowledge base.  Interventions:  - Provide teaching at level of understanding  - Provide teaching via preferred learning methods  Outcome: Progressing     Problem: Neurological Deficit  Goal: Neurological status is stable or improving  Description: Interventions:  - Monitor and assess patient's level of consciousness, motor function, sensory function, and level of assistance needed for ADLs.   - Monitor and report changes from baseline. Collaborate with interdisciplinary team to initiate plan and implement interventions as ordered.   - Provide and maintain a safe environment.  - Consider seizure precautions.  - Consider fall precautions.  - Consider aspiration precautions.  - Consider bleeding precautions.  Outcome: Progressing     Problem: Activity Intolerance/Impaired Mobility  Goal: Mobility/activity is maintained at optimum level for patient  Description: Interventions:  - Assess and monitor patient  barriers to mobility and  need for assistive/adaptive devices.  - Assess patient's emotional response to limitations.  - Collaborate with interdisciplinary team and initiate plans and interventions as ordered.  - Encourage independent activity per ability.  - Maintain proper body alignment.  - Perform active/passive rom as tolerated/ordered.  - Plan activities to conserve energy.  - Turn patient as appropriate  Outcome: Progressing     Problem: Communication Impairment  Goal: Ability to express needs and understand communication  Description: Assess patient's communication skills and ability to understand information.  Patient will demonstrate use of effective communication techniques, alternative methods of communication and understanding even if not able to speak.     - Encourage communication and provide alternate methods of communication as needed.  - Collaborate with case management/ for discharge needs.  - Include patient/family/caregiver in decisions related to communication.  Outcome: Progressing     Problem: Potential for Aspiration  Goal: Non-ventilated patient's risk of aspiration is minimized  Description: Assess and monitor vital signs, respiratory status, and labs (WBC).  Monitor for signs of aspiration (tachypnea, cough, rales, wheezing, cyanosis, fever).    - Assess and monitor patient's ability to swallow.  - Place patient up in chair to eat if possible.  - HOB up at 90 degrees to eat if unable to get patient up into chair.  - Supervise patient during oral intake.   - Instruct patient/ family to take small bites.  - Instruct patient/ family to take small single sips when taking liquids.  - Follow patient-specific strategies generated by speech pathologist.  Outcome: Progressing  Goal: Ventilated patient's risk of aspiration is minimized  Description: Assess and monitor vital signs, respiratory status, airway cuff pressure, and labs (WBC).  Monitor for signs of aspiration (tachypnea, cough, rales, wheezing,  cyanosis, fever).    - Elevate head of bed 30 degrees if patient has tube feeding.  - Monitor tube feeding.  Outcome: Progressing     Problem: Nutrition  Goal: Nutrition/Hydration status is improving  Description: Monitor and assess patient's nutrition/hydration status for malnutrition (ex- brittle hair, bruises, dry skin, pale skin and conjunctiva, muscle wasting, smooth red tongue, and disorientation). Collaborate with interdisciplinary team and initiate plan and interventions as ordered.  Monitor patient's weight and dietary intake as ordered or per policy. Utilize nutrition screening tool and intervene per policy. Determine patient's food preferences and provide high-protein, high-caloric foods as appropriate.     - Assist patient with eating.  - Allow adequate time for meals.  - Encourage patient to take dietary supplement as ordered.  - Collaborate with clinical nutritionist.  - Include patient/family/caregiver in decisions related to nutrition.  Outcome: Progressing

## 2025-06-03 NOTE — H&P
H&P - Critical Care/ICU   Name: Otis Robledo 59 y.o. male I MRN: [id number]  Unit/Bed#: ICU 13 I Date of Admission: 6/2/2025   Date of Service: 6/2/2025 I Hospital Day: 0       Assessment & Plan   Active Hospital Problems    Diagnosis Date Noted POA    Stroke-like symptoms 06/02/2025 Unknown    Obstructive sleep apnea 12/21/2012 Yes      Resolved Hospital Problems   No resolved problems to display.     Neuro:   Diagnosis:Acute Stroke  Exam on arrival to ED per report: Aphasia, right facial droop, bilateral sensory deficits  Exam s/p TNK: Right facial droop.  Previous history of paroxysmal atrial fibrillation, currently not on anticoagulation  6/2 CTH no acute intracranial abnormality  6/2 CTA no acute process  TNK Time: 2025  Neuro Checks Per Protocol Post TNK, Q1 Hour thereafter.   SBP Goal < 180, MAP >65  CTH @ 24 hr s/p TNK  Neurology/ PMR Consult.   Stroke Protocol:  Atorvastatin 40 Daily  Follow-up ECHO  Follow-up MRI  Lipid Panel, A1C  PT/OT  Diagnosis: At risk for ICU Delirium   CAM-ICU  Regulate Sleep Wake Cycle  Redirection as needed      CV:   Diagnosis: History of hypertension, hyperlipidemia, paroxysmal atrial fibrillation  Home medications: This propranolol-HCTZ 10-6.25, ASA 81  Plan:  Continue Cardiopulmonary Monitoring   MAP >65, SBP < 180  Hold home medications for now  Lipitor when tolerating p.o.-previously documented palpitations with statins      Pulm:  Diagnosis: No active issues  Plan:  On room air      GI:   Diagnosis: History of reflux esophagitis  Bowel Regimen: Held given n.p.o.  Continue home PPI      :   Diagnosis: Acute kidney injury  Continue to monitor BUN/Cr.  Baseline: 1.0  Today:1.48  Continue to Monitor I/O      F/E/N:   F: Saline chloride 100 mL/hour  E: Replete as needed, continue to trend electrolytes. Goal: K >4.0, Mg >2.0, Phos >3.0  N: N.p.o. pending speech      Heme/Onc:   Diagnosis: S/p TNK  Continue to monitor CBC  Consider Transfusion for Hemoglobin <7  Monitor for  evidence of acute bleed  DVT Prophylaxis: Held s/p TNK, bilateral SCD      Endo:   Diagnosis: No active issues  Follow-up A1c  Goal blood glucose 140-180 in the setting of critical illness      ID:   Diagnosis: Active issues  Continue to Monitor Fever Curve and WBC Count       MSK/Skin:   Diagnosis: At risk for pressure ulcers  Plan: Q2 Turning, OOB when appropriate   Strict skin surveillance   PT/OT Eval and Treat        Disposition: Stepdown Level 1    History of Present Illness   Otis Robledo is a 59 y.o. male with past medical history of hypertension, hyperlipidemia, paroxysmal atrial fibrillation who presented to the emergency department 6/2 with aphasia, right 5 sided facial droop and bilateral sensory deficits.  CT imaging without acute abnormalities.  TNK administered.  Symptoms gradually improving, arrived to the ICU for close neurologic monitoring.    History obtained from chart review and the patient.  Review of Systems: See HPI for Review of Systems    Historical Information   Past Medical History:  No date: Arthritis  No date: Depression  08/15/2008: Extrinsic asthma  05/20/2020: Exudative pharyngitis  No date: GERD (gastroesophageal reflux disease)  No date: Headache(784.0)  No date: Hypertension  No date: Visual impairment Past Surgical History:  No date: COLONOSCOPY  No date: EYE SURGERY  08/14/2018: FACIAL/NECK BIOPSY; N/A      Comment:  Procedure: EXCISION BIOPSY LESION POSTERIOR NECK;                 Surgeon: Wilberto Bowen MD;  [facility]: [facility] OR;                 Service: General   Current Outpatient Medications   Medication Instructions    ALPRAZolam (XANAX) 0.25 mg tablet TAKE ONE TABLET BY MOUTH DAILY AT BEDTIME AS NEEDED FOR ANXIETY OR PANIC ATTACK    aspirin (ECOTRIN LOW STRENGTH) 81 mg, Daily    bisoprolol-hydrochlorothiazide (ZIAC) 10-6.25 MG per tablet 2 tablets, Oral, Daily    Cholecalciferol (Vitamin D3) 250 MCG (08484 UT) TABS Take by mouth    clotrimazole-betamethasone (LOTRISONE)  1-0.05 % cream Topical, 2 times daily    hydroCHLOROthiazide 12.5 mg tablet TAKE ONE TABLET BY MOUTH EVERY DAY AS NEEDED FOR LOWER EXTREMITY EDEMA, ELEVATED BLOOD PRESSURE    nystatin (MYCOSTATIN) ointment Topical, 2 times daily    omega-3-acid ethyl esters (LOVAZA) 2 g, Oral, 2 times daily    pantoprazole (PROTONIX) 40 mg, Oral, Daily    Allergies[1]   Social History[2] Family History[3]       Objective :                   Vitals I/O      Most Recent Min/Max in 24hrs   Temp 97.8 °F (36.6 °C) Temp  Min: 97.8 °F (36.6 °C)  Max: 97.8 °F (36.6 °C)   Pulse 76 Pulse  Min: 70  Max: 91   Resp (!) 107 Resp  Min: 18  Max: 107   BP (!) 175/107 BP  Min: 165/102  Max: 217/101   O2 Sat 97 % SpO2  Min: 95 %  Max: 99 %    No intake or output data in the 24 hours ending 06/02/25 2133    Diet NPO    Invasive Monitoring           Physical Exam   Physical Exam  Vitals and nursing note reviewed.   Eyes:      Pupils: Pupils are equal, round, and reactive to light.   Skin:     General: Skin is warm.   HENT:      Head: Normocephalic.      Mouth/Throat:      Mouth: Mucous membranes are moist.   Neck:      Vascular: Central line present.   Cardiovascular:      Rate and Rhythm: Normal rate.      Pulses: Normal pulses.   Abdominal:      Palpations: Abdomen is soft.   Constitutional:       Appearance: He is well-developed.   Pulmonary:      Effort: Pulmonary effort is normal.   Neurological:      Comments: Patient awake and following commands.  Alert and oriented.  No aphasia on my exam.  Right-sided facial droop appreciated.  5 out of 5 strength bilateral upper and lower extremities, antigravity.          Diagnostic Studies        Lab Results: I have reviewed the following results:     Medications:  Scheduled PRN   [Held by provider] atorvastatin, 40 mg, QPM  potassium chloride, 20 mEq, Once      labetalol, 10 mg, Q4H PRN  ondansetron, 4 mg, Q6H PRN       Continuous    sodium chloride, 100 mL/hr, Last Rate: 100 mL/hr (06/02/25 2043)          Labs:   CBC    Recent Labs     06/02/25 1914   WBC 14.95*   HGB 17.9*   HCT 53.8*        BMP    Recent Labs     06/02/25 1914   SODIUM 139   K 3.1*      CO2 25   AGAP 13   BUN 25   CREATININE 1.48*   CALCIUM 9.8       Coags    Recent Labs     06/02/25 1914   INR 0.95   PTT 25        Additional Electrolytes  No recent results       Blood Gas    No recent results  No recent results LFTs  No recent results    Infectious  No recent results  Glucose  Recent Labs     06/02/25 1914   GLUC 140               [1]   Allergies  Allergen Reactions    Bactrim [Sulfamethoxazole-Trimethoprim] Diarrhea     vomiting    Pravastatin Palpitations   [2]   Social History  Tobacco Use    Smoking status: Never    Smokeless tobacco: Never   Vaping Use    Vaping status: Never Used   Substance Use Topics    Alcohol use: Yes     Alcohol/week: 1.0 standard drink of alcohol     Types: 1 Cans of beer per week     Comment: twice weekly    Drug use: No   [3]   Family History  Problem Relation Name Age of Onset    Depression Son

## 2025-06-03 NOTE — TELEPHONE ENCOUNTER
STILL ADMITTED:6/2/2025 - present (1 day)  [facility]    1ST ATTEMPT,     VIA [employer]     Thank you,     Evangelina MCNEIL/ VITALIY FRANCISH/ STROKE     ----- Message from Jimmy Hines MD sent at 6/3/2025  1:57 PM EDT -----  Otis Robledo will need follow-up in in 6 weeks with neurovascular team for Other = ATTENDING in 60 minute appointment. They will not require outpatient neurological testing.

## 2025-06-03 NOTE — SPEECH THERAPY NOTE
Speech-Language Pathology Progress Note      Patient Name: Otis Robledo    Today's Date: 6/3/2025      Consult received and chart reviewed. Per chart review and discussion with RN, pt passed RN dysphagia assessment and is tolerating regular diet with thin liquids with no s/s of aspiration. Speech/Language deficits also denied. Formal speech/swallow evaluation does not appear to be indicated at this time. If new concerns arise, please reconsult. Thank you.

## 2025-06-03 NOTE — PHYSICAL THERAPY NOTE
Physical Therapy Evaluation     Patient's Name: Otis Robledo    Admitting Diagnosis  Stroke ([facility]) [I63.9]  Stroke-like symptom [R29.90]    Problem List  Problem List[1]    Past Medical History  Past Medical History[2]    Past Surgical History  Past Surgical History[3]     06/03/25 1009   PT Last Visit   PT Visit Date 06/03/25   Note Type   Note type Evaluation   Pain Assessment   Pain Assessment Tool 0-10   Pain Score No Pain   Restrictions/Precautions   Weight Bearing Precautions Per Order No   Other Precautions Impulsive;Cognitive;Bed Alarm;Chair Alarm;Telemetry;Multiple lines;Fall Risk   Home Living   Type of Home House   Home Layout Two level;Stairs to enter with rails;1/2 bath on main level;Bed/bath upstairs  (2-3STE)   Bathroom Shower/Tub Tub/shower unit   Bathroom Toilet Standard   Bathroom Equipment   (denies GB and shower chair)   Home Equipment   (None)   Prior Function   Lives With Spouse;Family  (Wife and children)   Receives Help From Family   IADLs Independent with driving;Independent with meal prep;Independent with medication management   Falls in the last 6 months 0   Vocational Full time employment  (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 became agitated with history questions, displayed impulsive behavior with decreased safety awareness during mobility.)   Attention Difficulty attending to directions   Orientation Level Oriented X4   Following Commands Follows one step commands with increased time or repetition   Comments Pt agitated and disinterested in participating despite continued education.   Subjective   Subjective Pt willing and agreeable to PT evaluation upon entry.   RLE Assessment   RLE Assessment WFL   LLE Assessment   LLE Assessment WFL   Coordination   Movements  are Fluid and Coordinated 1   Bed Mobility   Additional Comments Pt OOB in recliner chair upon entry. positioned to comfort in recliner chair following session with chair alarm and call bell in reach.   Transfers   Sit to Stand 5  Supervision   Additional items Impulsive;Verbal cues   Stand to Sit 5  Supervision   Additional items Impulsive   Additional Comments Pt highly impulsive throughout mobility   Ambulation/Elevation   Gait pattern WNL   Gait Assistance 4  Minimal assist   Additional items Assist x 1;Verbal cues;Tactile cues   Assistive Device None   Distance 200'   Stair Management Assistance Not tested   Ambulation/Elevation Additional Comments Pt impulsive with mobility, walking too quickly requiring extensive VC/TC to slow down in order to maintain safety with attached lines. No LOB displayed. Stair trail deffered due to concerns with safety and decreased willingness to participate from pt.   Balance   Static Sitting Fair +   Dynamic Sitting Fair   Static Standing Fair   Dynamic Standing Fair -   Ambulatory Fair -   Activity Tolerance   Activity Tolerance Other (Comment)  (Tolerance limited by pt agitation and decreased participation.)   Medical Staff Made Aware OT, OTS present for co-evaluation.   Nurse Made Aware Yes, nsg gave clearance to work with pt.   Assessment   Prognosis Good   Problem List Decreased safety awareness;Impaired judgement;Decreased cognition;Decreased mobility   Assessment Pt is a 59 y.o. male admitted to [facility] on 6/2/2025 due to Stroke (HCC). PT consulted for functional mobility status assessment and discharge recommendations. PTA, pt was independent with ADLs/IADLs, + , employed full time, and independent with mobility and no AD use. Pt agitated upon entry and with history questions, able to obtain information and complete mobility with extensive education and redirection. Pt currently requiring supervision for transfers and Black for gait secondary to  impulsivity, agitation, and decreased safety awareness. Pt able to ambulate household distances with an acceptable gait pattern and no LOB. Further mobility including stair trials deferred due to safety concerns secondary to pt impulsivity, agitation, decreased safety awareness, and decreased willingness to participate. Following session, PT positioned in bedside recliner with chair alarm and call bell in reach. Pt is currently functioning below and will benefit from skilled PT intervention throughout hospital stay to maximize functional mobility for improved safety upon d/c.  From PT/mobility standpoint, recommendation at time of d/c would be level 3 pending progress in order to facilitate return to PLOF. The patient's AM-PAC Basic Mobility Inpatient Short Form Raw Score is  . A Raw score of greater than or equal to 16 suggests the patient may benefit from discharge to home. Please also refer to the recommendation of the Physical Therapist for safe discharge planning.   Barriers to Discharge Other (Comment)  (Concerns for safety when unsupervised due to impulsivity and decreased safety awareness.)   Goals   Patient Goals To go home   STG Expiration Date 06/17/25   Short Term Goal #1 1. Pt will complete bed mobility and STS transfers independently for improved safety.   2. Pt will ambulate community distances with supervision for increased independence and community participation.   3. Pt will improve standing balance to at least fair for improved safety and reduced risk of falls.   4. Pt will display ability to negotiate 3 stairs with supervision in order to navigate home environment.   5. Pt will display ability to follow one step commands without repetition with greater than 75% accuracy within a PT session for improved safety.   Plan   Treatment/Interventions LE strengthening/ROM;Functional transfer training;Therapeutic exercise;Endurance training;Patient/family training;Spoke to nursing;OT   PT Frequency  3-5x/wk   Discharge Recommendation   Rehab Resource Intensity Level, PT III (Minimum Resource Intensity)   AM-PAC Basic Mobility Inpatient   Turning in Flat Bed Without Bedrails 4   Lying on Back to Sitting on Edge of Flat Bed Without Bedrails 4   Moving Bed to Chair 3   Standing Up From Chair Using Arms 3   Walk in Room 3   Climb 3-5 Stairs With Railing 2   Basic Mobility Inpatient Raw Score 19   Basic Mobility Standardized Score 42.48   [facility] Highest Level Of Mobility   JH-HLM Goal 6: Walk 10 steps or more   JH-HLM Achieved 7: Walk 25 feet or more     RAYO Palmer         [1]   Patient Active Problem List  Diagnosis    Anxiety    Obstructive sleep apnea    Benign essential hypertension    Depression    Direct inguinal hernia    Dyslipidemia    Esophagitis, reflux    Premature ejaculation    PVC's (premature ventricular contractions)    Paroxysmal atrial fibrillation (HCC)    Drug-induced erectile dysfunction    Gynecomastia, male    Stroke (HCC)    NUZHAT (acute kidney injury) (HCC)   [2]   Past Medical History:  Diagnosis Date    Arthritis     Depression     Extrinsic asthma 08/15/2008    Exudative pharyngitis 05/20/2020    GERD (gastroesophageal reflux disease)     Headache(784.0)     Hypertension     Visual impairment    [3]   Past Surgical History:  Procedure Laterality Date    COLONOSCOPY      EYE SURGERY      FACIAL/NECK BIOPSY N/A 08/14/2018    Procedure: EXCISION BIOPSY LESION POSTERIOR NECK;  Surgeon: Wilberto Bowen MD;  Location:  MAIN OR;  Service: General

## 2025-06-03 NOTE — OCCUPATIONAL THERAPY NOTE
"    Occupational Therapy Evaluation     Patient Name: Otis Robledo  Today's Date: 6/3/2025  Problem List  Principal Problem:    Stroke (HCC)  Active Problems:    Obstructive sleep apnea    Benign essential hypertension    Dyslipidemia    Esophagitis, reflux    Paroxysmal atrial fibrillation (HCC)    NUHZAT (acute kidney injury) (HCC)    Past Medical History  Past Medical History[1]  Past Surgical History  Past Surgical History[2]        06/03/25 1008   OT Last Visit   OT Visit Date 06/03/25   Note Type   Note type Evaluation   Pain Assessment   Pain Assessment Tool 0-10   Pain Score No Pain   Restrictions/Precautions   Weight Bearing Precautions Per Order No   Other Precautions Impulsive;Cognitive;Chair Alarm;Bed Alarm;Multiple lines;Telemetry;Fall Risk;Pain   Home Living   Type of Home House   Home Layout Two level;1/2 bath on main level;Bed/bath upstairs;Other (Comment)  (2-3 ANDRES)   Bathroom Shower/Tub Tub/shower unit  (Pt reports additional WIS)   Bathroom Toilet Standard   Bathroom Equipment Other (Comment)  (Denies any)   Home Equipment Other (Comment)  (Denies any)   Additional Comments Pt lives w/ his spouse and children in a 2SH w/ 2-3 ANDRES   Prior Function   Level of [place] Independent with ADLs;Independent with functional mobility;Independent with IADLS   Lives With Family   Receives Help From Family   IADLs Independent with driving;Independent with meal prep;Independent with medication management   Falls in the last 6 months 0   Vocational Full time employment   Comments PTA, pt I w/ ADLs and IADLs; pt works full-time; (+)    Lifestyle   Autonomy I w/ ADLs, I w/ IADLs, I w/ functional mobility and transfers   Reciprocal Relationships Wife, children   Service to Others Full-time employment   Intrinsic Gratification Watching TV   Subjective   Subjective \"Do we really have to do all this right now?\"   ADL   Eating Assistance 5  Supervision/Setup   Grooming Assistance 5  Supervision/Setup   UB Bathing " "Assistance 5  Supervision/Setup   LB Bathing Assistance 5  Supervision/Setup   UB Dressing Assistance 5  Supervision/Setup   LB Dressing Assistance 5  Supervision/Setup   Toileting Assistance  5  Supervision/Setup   Functional Assistance 5  Supervision/Setup   Additional Comments Pt is S for all ADLs   Bed Mobility   Supine to Sit Unable to assess   Sit to Supine Unable to assess   Additional Comments Pt greeted and left in recliner w/ chair alarm activated and all needs within reach at EOS   Transfers   Sit to Stand 5  Supervision   Additional items Impulsive;Verbal cues   Stand to Sit 5  Supervision   Additional items Impulsive;Verbal cues   Additional Comments Pt S for STS transfers; demonstrates impulsivity and requires frequent VC's for safety awareness   Functional Mobility   Functional Mobility 5  Supervision   Additional Comments Pt S to ambulate household distance; pt requires max VC's to slow down walking pace; pt demonstrates impulsivity and decreased attention   Balance   Static Sitting Fair +   Dynamic Sitting Fair   Static Standing Fair -   Dynamic Standing Fair -   Ambulatory Fair -   Activity Tolerance   Activity Tolerance Other (Comment)  (Pt agitated and wanted to \"get it over with\")   Medical Staff Made Aware RN; PT, Nely, Gianfranco CADE   Nurse Made Aware Yes   RUE Assessment   RUE Assessment WFL   LUE Assessment   LUE Assessment WFL   Hand Function   Gross Motor Coordination Functional   Fine Motor Coordination Functional   Sensation   Light Touch No apparent deficits   Additional Comments Pt denies numbness/tingling   Vision-Basic Assessment   Current Vision No visual deficits   Patient Visual Report Other (Comment)  (Pt denies blurry vision or diplopia)   Cognition   Overall Cognitive Status Impaired   Arousal/Participation Alert;Responsive   Attention Difficulty attending to directions   Orientation Level Oriented X4   Memory Decreased recall of precautions   Following Commands Follows one " "step commands with increased time or repetition   Comments (S)  Pt slightly agitated upon arrival; requires increased education in order to participate w/ therapy; pt demonstrates impulsivity and decreased attention, requiring max VC\"s for safety and technique; pt dismissive at times and displays decreased insight into deficits   Assessment   Limitation Decreased ADL status;Decreased Safe judgement during ADL;Decreased cognition;Decreased endurance;Decreased self-care trans;Decreased high-level ADLs   Prognosis Fair   Assessment Pt is a 60 y/o M who presents to [facility] for evaluation of R facial droop and aphasia, dx'd w/ acute stroke. Pt s/p TNK administration . Pt  has a past medical history of Arthritis, Depression, Extrinsic asthma, Exudative pharyngitis, GERD (gastroesophageal reflux disease), Headache(784.0), Hypertension, and Visual impairment. At baseline, pt lives w/ spouse and children in a 2SH w/ 2-3 ANDRES. Pt previously I w/ ADLs and IADLs and I w/ functional mobility and transfers. Pt drives. Pt has active OT consult and OOB orders. Upon OT entry, pt found seated in the recliner and agreeable to participate in therapy after increased education. Pt is currently performing at S for UB ADLs, S for LB ADLs, and S for functional mobility and transfers. Upon OT evaluation, pt demonstrates impairments w/ balance, endurance, attention, standing tolerance, activity tolerance, and cognition. OT to address the listed impairments in the following occupational performance areas: dressing, bathing, toileting, functional mobility, transfers, and cognition. Pt is currently functioning below baseline performance and demonstrates need for OT services. Pt will be seen 2-3x/week w/ goals to  within 10-14 days. OT to recommend minimum resource intensity upon d/c.   Goals   Patient Goals To go home   LTG Time Frame 10-14   Long Term Goal #1 See below defined goals   Plan   Treatment Interventions ADL retraining;Functional " transfer training;Endurance training;Cognitive reorientation;Patient/family training;Equipment evaluation/education;Compensatory technique education;Continued evaluation;Energy conservation;Activityengagement   Goal Expiration Date 06/17/25   OT Frequency 2-3x/wk   Discharge Recommendation   Rehab Resource Intensity Level, OT III (Minimum Resource Intensity)  (OP OT)   Additional Comments  Pt seen as a co-session due to the patient's co-morbidities, clinically unstable presentation, and present impairments which are a regression from the patient's baseline.   Additional Comments 2 The patient's raw score on the AM-PAC Daily Activity Inpatient Short Form is 18. A raw score of less than 19 suggests the patient may benefit from discharge to post-acute rehabilitation services. Please refer to the recommendation of the Occupational Therapist for safe discharge planning.   AM-PAC Daily Activity Inpatient   Lower Body Dressing 3   Bathing 3   Toileting 3   Upper Body Dressing 3   Grooming 3   Eating 3   Daily Activity Raw Score 18   Daily Activity Standardized Score (Calc for Raw Score >=11) 38.66   AM-PAC Applied Cognition Inpatient   Following a Speech/Presentation 4   Understanding Ordinary Conversation 4   Taking Medications 4   Remembering Where Things Are Placed or Put Away 4   Remembering List of 4-5 Errands 3   Taking Care of Complicated Tasks 3   Applied Cognition Raw Score 22   Applied Cognition Standardized Score 47.83   Barthel Index   Feeding 10   Bathing 5   Grooming Score 5   Dressing Score 10   Bladder Score 10   Bowels Score 10   Toilet Use Score 10   Transfers (Bed/Chair) Score 10   Mobility (Level Surface) Score 10   Stairs Score 0   Barthel Index Score 80   End of Consult   Education Provided Yes   Patient Position at End of Consult Bedside chair;Bed/Chair alarm activated;All needs within reach   Nurse Communication Nurse aware of consult     Goals   1) Pt will complete UB ADLs w/ mod I to increase  functional independence.     2) Pt will complete LB ADLs w/ mod I to increase functional independence.      3) Pt will complete functional mobility w/ mod I, with or without use of AD/DME, to increase independence and safety during functional tasks.      4) Pt will complete all functional transfers w/ mod I, with or without use of AD/DME, to increase independence and safety w/ functional transfers.      5) Pt will tolerate 10 minutes of standing w/ mod I, with or without use of AD/DME, to improve endurance to participate in functional activities.     6) Pt will complete toileting w/ mod I, with or without use of AD/DME, to increase independence w/ self-care.     7) Pt will complete bed mobility w/ mod I and sit EOB w/ G balance as a prerequisite for seated ADLs.     8) Pt will remain 100% attentive throughout formal cognitive assessment.     RADHA Tucker              [1]   Past Medical History:  Diagnosis Date    Arthritis     Depression     Extrinsic asthma 08/15/2008    Exudative pharyngitis 05/20/2020    GERD (gastroesophageal reflux disease)     Headache(784.0)     Hypertension     Visual impairment    [2]   Past Surgical History:  Procedure Laterality Date    COLONOSCOPY      EYE SURGERY      FACIAL/NECK BIOPSY N/A 08/14/2018    Procedure: EXCISION BIOPSY LESION POSTERIOR NECK;  Surgeon: Wilberto Bowen MD;  Location:  MAIN OR;  Service: General

## 2025-06-03 NOTE — PROGRESS NOTES
Progress Note - Critical Care/ICU   Name: Otis Robledo 59 y.o. male I MRN: [id number]  Unit/Bed#: ICU 13 I Date of Admission: 6/2/2025   Date of Service: 6/3/2025 I Hospital Day: 1      Assessment & Plan   Active Hospital Problems    Diagnosis Date Noted POA    Stroke ([facility]) 06/02/2025 Unknown    NUZHAT (acute kidney injury) ([facility] located [facility]) 06/03/2025 Unknown    Paroxysmal atrial fibrillation ([facility]) 12/16/2020 Yes    Dyslipidemia 02/07/2014 Yes    Esophagitis, reflux 04/22/2013 Yes    Obstructive sleep apnea 12/21/2012 Yes    Benign essential hypertension 08/15/2008 Yes      Resolved Hospital Problems   No resolved problems to display.     euro:   Diagnosis:Acute Stroke  Exam on arrival to ED per report: Aphasia, right facial droop, bilateral sensory deficits  Exam s/p TNK: Right facial droop.  Previous history of paroxysmal atrial fibrillation, currently not on anticoagulation  6/2 CTH no acute intracranial abnormality  6/2 CTA no acute process  TNK Time: 2025  Neuro Checks Per Protocol Post TNK, Q1 Hour thereafter.   SBP Goal < 180, MAP >65  CTH @ 24 hr s/p TNK  Neurology/ PMR Consult.   Stroke Protocol:  Atorvastatin 40 Daily when passes swallow eval  Follow-up ECHO  Follow-up MRI  Lipid Panel, A1C  PT/OT  Diagnosis: At risk for ICU Delirium   CAM-ICU  Regulate Sleep Wake Cycle  Redirection as needed        CV:   Diagnosis: History of hypertension, hyperlipidemia, paroxysmal atrial fibrillation  Home medications: bisoprolol- HCTZ 20-12.5, ASA 81  Plan:  Continue Cardiopulmonary Monitoring   MAP >65, SBP < 180  Hold home medications for now  Lipitor when tolerating p.o.-previously documented palpitations with statins        Pulm:  Diagnosis: No active issues  Plan:  On room air        GI:   Diagnosis: History of reflux esophagitis  Bowel Regimen: Held given n.p.o.  Continue home PPI        :   Diagnosis: Acute kidney injury  Continue to monitor BUN/Cr.  Baseline: 1.0  Today:1.48  Continue to Monitor I/O        F/E/N:   F: Saline  chloride 100 mL/hour  E: Replete as needed, continue to trend electrolytes. Goal: K >4.0, Mg >2.0, Phos >3.0  N: N.p.o. pending speech        Heme/Onc:   Diagnosis: S/p TNK  Continue to monitor CBC  Consider Transfusion for Hemoglobin <7  Monitor for evidence of acute bleed  DVT Prophylaxis: Held s/p TNK, bilateral SCD        Endo:   Diagnosis: No active issues  Follow-up A1c  Goal blood glucose 140-180 in the setting of critical illness        ID:   Diagnosis: Active issues  Continue to Monitor Fever Curve and WBC Count         MSK/Skin:   Diagnosis: At risk for pressure ulcers  Plan: Q2 Turning, OOB when appropriate   Strict skin surveillance   PT/OT Eval and Treat    Disposition: Stepdown Level 1    ICU Core Measures     A: Assess, Prevent, and Manage Pain Has pain been assessed? Yes  Need for changes to pain regimen? No   B: Both SAT/SAT  N/A   C: Choice of Sedation RASS Goal: 0 Alert and Calm  Need for changes to sedation or analgesia regimen? No   D: Delirium CAM-ICU: Negative   E: Early Mobility  Plan for early mobility? Yes   F: Family Engagement Plan for family engagement today? Yes         Prophylaxis:  VTE Contraindicated secondary to: s/p TNK   Stress Ulcer  not orderedcovered bypantoprazole (PROTONIX) 40 mg tablet [[id number]] (Long-Term Med)         24 Hour Events : NAEO, improving neuro deficits, aphasia resolved.   Subjective   Review of Systems: See HPI for Review of Systems    Objective :                   Vitals I/O      Most Recent Min/Max in 24hrs   Temp 97.8 °F (36.6 °C) Temp  Min: 97.8 °F (36.6 °C)  Max: 97.8 °F (36.6 °C)   Pulse 70 Pulse  Min: 68  Max: 91   Resp (!) 29 Resp  Min: 16  Max: 107   /85 BP  Min: 134/77  Max: 217/101   O2 Sat 96 % SpO2  Min: 93 %  Max: 99 %      Intake/Output Summary (Last 24 hours) at 6/3/2025 0405  Last data filed at 6/3/2025 0000  Gross per 24 hour   Intake 463.33 ml   Output 650 ml   Net -186.67 ml       Diet NPO    Invasive Monitoring           Physical  Exam   Physical Exam  Vitals and nursing note reviewed.   Eyes:      Pupils: Pupils are equal, round, and reactive to light.   Skin:     General: Skin is warm.   HENT:      Mouth/Throat:      Mouth: Mucous membranes are moist.   Cardiovascular:      Rate and Rhythm: Normal rate.      Pulses: Normal pulses.   Abdominal:      Palpations: Abdomen is soft.   Constitutional:       Appearance: He is well-developed.   Neurological:      Comments: Mild right-sided facial droop.  No aphasia appreciable on my exam.  Awake alert and oriented.          Diagnostic Studies        Lab Results: I have reviewed the following results:     Medications:  Scheduled PRN   [Held by provider] atorvastatin, 40 mg, QPM  [Held by provider] bisoprolol, 20 mg, Daily   And  [Held by provider] hydroCHLOROthiazide, 12.5 mg, Daily  [Held by provider] pantoprazole, 40 mg, Early Morning      acetaminophen, 1,000 mg, Q6H PRN   Followed by  [START ON 6/4/2025] acetaminophen, 650 mg, Q6H PRN  labetalol, 10 mg, Q4H PRN  ondansetron, 4 mg, Q6H PRN       Continuous    sodium chloride, 100 mL/hr, Last Rate: 100 mL/hr (06/02/25 2043)         Labs:   CBC    Recent Labs     06/02/25 1914   WBC 14.95*   HGB 17.9*   HCT 53.8*        BMP    Recent Labs     06/02/25 1914   SODIUM 139   K 3.1*      CO2 25   AGAP 13   BUN 25   CREATININE 1.48*   CALCIUM 9.8       Coags    Recent Labs     06/02/25 1914   INR 0.95   PTT 25        Additional Electrolytes  No recent results       Blood Gas    No recent results  No recent results LFTs  No recent results    Infectious  No recent results  Glucose  Recent Labs     06/02/25 1914   GLUC 140

## 2025-06-03 NOTE — CONSULTS
NEUROLOGY RESIDENCY CONSULT NOTE     Name: Otis Robledo   Age & Sex: 59 y.o. male   MRN: [id number]  Unit/Bed#: ICU 13   Encounter: [id number]  Length of Stay: 1    ASSESSMENT & PLAN     Obstructive sleep apnea  Assessment & Plan  Recommend treating w/ CPAP    * Stroke (HCC)  Assessment & Plan  60yo M w/ a PMH of HTN and PAF (not on AC, had d/c on own) Presented as stroke alert on 6/2/2025  7:07 PM with initial NIHSS of 9 and LKW 6:30pm 06/02/25, initial Blood Pressure: (!) 217/101. BP controlled to 175 prior to administration of TNK  Initial presenting deficits were aphasia, R facial droop b/l sensory deficits (cannot sense being touched).   Pt was found to be a thrombolysis candidate within the appropriate time window and without obvious contraindication and TNK was given.   Post thrombolysis exam: R facial droop has improved but still persistent, severe aphasia still present   Current Blood Pressure: (!) 175/83, BP over last 24 hours: BP  Min: 175/83  Max: 217/101   Vascular risk factors: hypertension  Home meds: ASA 81mg    Workup:  Lab Results   Component Value Date    HGBA1C 5.9 (H) 02/14/2025    CHOLESTEROL 243 (H) 02/14/2025    LDLCALC 158 (H) 02/14/2025    TRIG 246 (H) 02/14/2025    INR 0.95 06/02/2025      CTH (upon presentation): No intracranial mass, mass effect or midline shift. No CT signs of acute infarction.  No acute parenchymal hemorrhage. Normal intracranial vasculature.  CTA: negative for LVOs, aneurysms, dissection or AVMs, has mild bilat carotid and vert origin plaque,   MRI: pending   Repeat CTH at 24 hours: pending   Echocardiogram: pending  Telemetry: no events    Pertinent scores:  - NIHSS: 9  Stroke Modified [place] Score: 0 (No baseline symptoms/disability)    Impression: new acute onset aphasia and R facial droop concerning for acute stroke w/I time window for TNK and TNK administered; possible etiology is past hx of PAF not on AC     Plan:  - Stroke pathway  - BP: BP as close to but <180  SBP due to recent tPA administration  - With thrombolysis admin monitor BP and neuro exam q 15 mins x 2 hours then q 30 mins x 6 hours then q1 hour x 24 hours  - If SBP is >180, increase frequency of BP measurements and administer antihypertensive agents as needed to maintain at or below 180 SBP  - Obtain lipids and A1c  - atorvastatin 40mg qhs  - Maintain glucose <180, SSI for coverage if indicated  - Repeat CTH if GCS drops 2pts in 1hr or if post thrombolysis if pt is reporting severe headache, acute increase in BP, N/V  - Medical management as per primary team appreciated  - TTE  - MRI brain pending  - DVT ppx held for 24 hours due to thrombolysis administration; Apply SCDs  - Monitor on telemetry  - PT/OT/Speech/PM&R input appreciated  - Repeat CTH at 24 hours after thrombolysis administration prior to starting AP/AC  - CPAP for HIEU   - Stroke education  - Upon chart review and interview with patient, mixed history from patient and documentation, unclear if patient have definitive diagnosis of A-fib or should be on anticoagulation, likely need to clarify with cardiology.  If cardiology felt like he does not have A-fib, we will look into secondary causes such as hypercoagulable state.            Otis Robledo will need follow-up in in 6 weeks with neurovascular team for Other in 60 minute appointment. They will not require outpatient neurological testing.        SUBJECTIVE     Reason for Consult / Principal Problem: R facial droop and expressive aphasia  Hx and PE limited by: aphasia    HPI: Otis Robledo is a 59 y.o.  male w/ a  PMH of HTN and PAF (not on AC, had d/c on own) Presented as stroke alert on 6/2/2025  7:07 PM for acute onset aphasia and R facial droop that was appreciated by son that was present with him. LKW was 6:30pm where son had noted patient was talking fine and able to move things w/o issue and then got on the phone and while talking suddenly stopped making sense and then was immediately taken to  the [facility] ED for concern for a stroke and stroke alert was activated.   Takes ASA 81mg daily. Patient's son had noted he had been on a blood thinner in the past but has since stopped. Record review shows he had a hx of PAF on xarelto when he had chest palpitations but stopped this on his own (for symptoms of sweating and fatigue).       Patient's son acted as decision maker and after discussion risks, benefits, and alternative of TNK they were agreeable to get TNK. BP was in 210s and given labetelol and went down to 170s and TNK was administered after IV was placed. .    6/3/2025    HPI: Otis is a 59 year old male, who is right handed and has a past medical history of hypertension, paroxysmal atrial fibrillation, obstructive sleep apnea, dyslipidemia, acute kidney injury.     Otis presented as a stroke alert yesterday. He was last known well around 6:30 pm. He had an acute onset of left arm weakness, and aphasia. Pt noted from the onset of his symptoms that he was unable to speak, but he was able to understand people. Patient was brought to [facility] ED by his son. Pt noted that his symptoms resolved after receiving TNK.     NIH score was 9 upon arrival.   Interval non contrast CT was negative for blood.   CTA;  No large vessel occlusion. No stenosis in the carotids noted. No intracranial bleeding.  Pt received TNK at 7:28 pm.     Overnight - No acute events; telemetry - no events, sinus rthymn.     This morning, patient has no neurological complaints. He denies difficulty speaking. He denies weakness, or changes in sensation.  After clarification, patient reported his evaluation by cardiology has been negative for A-fib.  Unclear if cardiologist want this patient to be on anticoagulation, was not mentioned on previous note from 2023.  Zio patch report was reviewed, only noted PVCs, however no A-fib.     Exam          Inpatient consult to Neurology  Consult performed by: Woody Eason DO  Consult ordered by: Triage  Protocol Emergency, MD          Historical Information   Past Medical History[1]  Past Surgical History[2]  Social History   Social History     Substance and Sexual Activity   Alcohol Use Yes    Alcohol/week: 1.0 standard drink of alcohol    Types: 1 Cans of beer per week    Comment: twice weekly     Social History     Substance and Sexual Activity   Drug Use No     E-Cigarette/Vaping    E-Cigarette Use Never User      E-Cigarette/Vaping Substances    Nicotine No     THC No     CBD No     Flavoring No     Other No     Unknown No      Tobacco Use History[3]  Family History: Family History[4]  Meds/Allergies   all current active meds have been reviewed, current meds:   Current Facility-Administered Medications:     acetaminophen (Ofirmev) injection 1,000 mg, Q6H PRN, Last Rate: Stopped (06/03/25 1000) **FOLLOWED BY** [START ON 6/4/2025] acetaminophen (TYLENOL) tablet 650 mg, Q6H PRN    atorvastatin (LIPITOR) tablet 40 mg, QPM    [Held by provider] bisoprolol (ZEBETA) tablet 20 mg, Daily **AND** [Held by provider] hydroCHLOROthiazide tablet 12.5 mg, Daily    labetalol (NORMODYNE) injection 10 mg, Q4H PRN    ondansetron (ZOFRAN) injection 4 mg, Q6H PRN    pantoprazole (PROTONIX) EC tablet 40 mg, Early Morning, and PTA meds:   Prior to Admission Medications   Prescriptions Last Dose Informant Patient Reported? Taking?   ALPRAZolam (XANAX) 0.25 mg tablet 6/2/2025  No Yes   Sig: TAKE ONE TABLET BY MOUTH DAILY AT BEDTIME AS NEEDED FOR ANXIETY OR PANIC ATTACK   Cholecalciferol (Vitamin D3) 250 MCG (55832 UT) TABS 6/2/2025 Self Yes Yes   Sig: Take by mouth   aspirin (ECOTRIN LOW STRENGTH) 81 mg EC tablet 6/2/2025 Self Yes Yes   Sig: Take 81 mg by mouth in the morning.   bisoprolol-hydrochlorothiazide (ZIAC) 10-6.25 MG per tablet 6/2/2025  No Yes   Sig: TAKE TWO TABLETS BY MOUTH EVERY DAY   clotrimazole-betamethasone (LOTRISONE) 1-0.05 % cream Unknown  No No   Sig: Apply topically 2 (two) times a day   hydroCHLOROthiazide 12.5  mg tablet 2025  No Yes   Sig: TAKE ONE TABLET BY MOUTH EVERY DAY AS NEEDED FOR LOWER EXTREMITY EDEMA, ELEVATED BLOOD PRESSURE   nystatin (MYCOSTATIN) ointment Not Taking  No No   Sig: Apply topically 2 (two) times a day   Patient not taking: Reported on 2025   omega-3-acid ethyl esters (LOVAZA) 1 g capsule 2025  No Yes   Sig: TAKE TWO CAPSULES BY MOUTH TWICE A DAY   pantoprazole (PROTONIX) 40 mg tablet 2025  No Yes   Sig: TAKE ONE TABLET BY MOUTH EVERY DAY      Facility-Administered Medications: None     Allergies[5]    Review of previous medical records was  completed.       Review of Systems         OBJECTIVE     Patient ID: Otis Robledo is a 59 y.o. male.    Vitals:   Vitals:    25 0900 25 1000 25 1100 25 1200   BP: 137/86 165/96 167/95 152/66   BP Location: Left arm Left arm Left arm Left arm   Pulse: 62 70 68 60   Resp: 16 22 19 17   Temp:    98 °F (36.7 °C)   TempSrc:    Oral   SpO2: 94% 94% 94% 94%   Weight:       Height:          Body mass index is 30.52 kg/m².     Intake/Output Summary (Last 24 hours) at 6/3/2025 1402  Last data filed at 6/3/2025 1200  Gross per 24 hour   Intake 1713.33 ml   Output 1700 ml   Net 13.33 ml       Temperature:   Temp (24hrs), Av.8 °F (36.6 °C), Min:97.7 °F (36.5 °C), Max:98 °F (36.7 °C)    Temperature: 98 °F (36.7 °C)    Invasive Devices:   Invasive Devices       Peripheral Intravenous Line  Duration             Peripheral IV 25 Left Antecubital <1 day    Peripheral IV 25 Right Antecubital <1 day    Peripheral IV 25 Right Antecubital <1 day                    Physical Exam     Neurologic Exam     Mental Status   Patient is alert and oriented to person, place, time and event.   Speech: Fluent; no dysarthria or aphasia.   Comprehension and ability to follow commands were intact.        Cranial Nerves     CN 2: Visual acuity intact, visual fields full to confrontation.  CN 3, 4, 6: Extraocular movements intact.   CN 5:  Facial sensation intact bilaterally.  CN 7: Facial movements symmetric.   CN 8: Not tested.   CN 9, 10: Palate elevates symmetrically.  CN 11: Shoulder shrug 5/5 bilaterally.  CN 12: Tongue midline.      Motor Exam Motor: Normal bulk and tone. Strength 5/5 in bilateral upper and lower extremities.        Sensory Exam   Sensory: Normal light touch sensation in upper and lower extremities.        Gait, Coordination, and Reflexes   Biceps: 2+ on right and left  Patellar:  2+ on right and left                 LABORATORY DATA     Labs: Results Review Statement: No pertinent imaging studies reviewed.  Results from last 7 days   Lab Units 06/03/25  0511 06/02/25 1914   WBC Thousand/uL 11.10* 14.95*   HEMOGLOBIN g/dL 16.3 17.9*   HEMATOCRIT % 48.9 53.8*   PLATELETS Thousands/uL 195 274   SEGS PCT % 66  --    MONO PCT % 8  --    EOS PCT % 1  --       Results from last 7 days   Lab Units 06/03/25  0511 06/02/25 1914   POTASSIUM mmol/L 3.7 3.1*   CHLORIDE mmol/L 103 101   CO2 mmol/L 27 25   BUN mg/dL 20 25   CREATININE mg/dL 0.97 1.48*   CALCIUM mg/dL 8.7 9.8   ALK PHOS U/L 52  --    ALT U/L 32  --    AST U/L 20  --      Results from last 7 days   Lab Units 06/03/25  0511   MAGNESIUM mg/dL 1.7*     Results from last 7 days   Lab Units 06/03/25  0511   PHOSPHORUS mg/dL 3.5      Results from last 7 days   Lab Units 06/02/25  1914   INR  0.95   PTT seconds 25               IMAGING & DIAGNOSTIC TESTING     Radiology Results: Results Review Statement: I personally reviewed the following image studies/reports in PACS and discussed pertinent findings with Radiology: CT head and CT A H/N. My interpretation of the radiology images/reports is: no acute intracranial abnormalities and agree w/ rad read .  MRI brain wo contrast   Final Result by Chirag Stafford MD (06/03 0548)      Single small acute cortical infarction involving the left frontal lobe premotor cortex region.      The study was marked in EPIC for immediate notification.       Workstation performed: [id number]         CTA stroke alert (head/neck)   Final Result by Johnny Fitch MD (06/02 1936)      No acute process on CTA neck and brain.               Findings were directly discussed with Dr. Eason at 7:32 p.m. on 6/2/2025.      Workstation performed: [id number]         CT stroke alert brain   Final Result by Johnny Fitch MD (06/02 1922)      No acute intracranial abnormality.      Findings were directly discussed with Dr. Wilson at approximately 7:21 p.m. on 6/2/2025.      Workstation performed: [id number]         CT head wo contrast    (Results Pending)       Other Diagnostic Testing: Results Review Statement: No pertinent imaging studies reviewed.    ACTIVE MEDICATIONS       Current Facility-Administered Medications:     acetaminophen (Ofirmev) injection 1,000 mg, Q6H PRN, Last Rate: Stopped (06/03/25 1000) **FOLLOWED BY** [START ON 6/4/2025] acetaminophen (TYLENOL) tablet 650 mg, Q6H PRN    atorvastatin (LIPITOR) tablet 40 mg, QPM    [Held by provider] bisoprolol (ZEBETA) tablet 20 mg, Daily **AND** [Held by provider] hydroCHLOROthiazide tablet 12.5 mg, Daily    labetalol (NORMODYNE) injection 10 mg, Q4H PRN    ondansetron (ZOFRAN) injection 4 mg, Q6H PRN    pantoprazole (PROTONIX) EC tablet 40 mg, Early Morning    Prior to Admission medications    Medication Sig Start Date End Date Taking? Authorizing Provider   ALPRAZolam (XANAX) 0.25 mg tablet TAKE ONE TABLET BY MOUTH DAILY AT BEDTIME AS NEEDED FOR ANXIETY OR PANIC ATTACK 2/12/25  Yes Marsha Eduardo DO   aspirin (ECOTRIN LOW STRENGTH) 81 mg EC tablet Take 81 mg by mouth in the morning.   Yes Historical Provider, MD   bisoprolol-hydrochlorothiazide (ZIAC) 10-6.25 MG per tablet TAKE TWO TABLETS BY MOUTH EVERY DAY 4/18/25  Yes Marsha Eduardo DO   Cholecalciferol (Vitamin D3) 250 MCG (09721 UT) TABS Take by mouth   Yes Historical Provider, MD   hydroCHLOROthiazide 12.5 mg tablet TAKE ONE TABLET BY MOUTH EVERY  DAY AS NEEDED FOR LOWER EXTREMITY EDEMA, ELEVATED BLOOD PRESSURE 12/27/24  Yes Marsha Eduardo DO   hauyy-7-arrx ethyl esters (LOVAZA) 1 g capsule TAKE TWO CAPSULES BY MOUTH TWICE A DAY 6/17/24  Yes Gus Bah MD   pantoprazole (PROTONIX) 40 mg tablet TAKE ONE TABLET BY MOUTH EVERY DAY 1/14/25  Yes Marsha Eduardo DO   clotrimazole-betamethasone (LOTRISONE) 1-0.05 % cream Apply topically 2 (two) times a day 2/21/25   Marsha Eduardo DO   nystatin (MYCOSTATIN) ointment Apply topically 2 (two) times a day  Patient not taking: Reported on 6/2/2025 2/21/25   Marsha Eduardo DO         CODE STATUS & ADVANCED DIRECTIVES     Code Status: Level 1 - Full Code  Advance Directive and Living Will:      Power of :    POLST:        VTE Pharmacologic Prophylaxis: as per primary  VTE Mechanical Prophylaxis: sequential compression device    ==  Jimmy Hines MD   [facility] Neurology Residency, PGY-1          [1]   Past Medical History:  Diagnosis Date    Arthritis     Depression     Extrinsic asthma 08/15/2008    Exudative pharyngitis 05/20/2020    GERD (gastroesophageal reflux disease)     Headache(784.0)     Hypertension     Visual impairment    [2]   Past Surgical History:  Procedure Laterality Date    COLONOSCOPY      EYE SURGERY      FACIAL/NECK BIOPSY N/A 08/14/2018    Procedure: EXCISION BIOPSY LESION POSTERIOR NECK;  Surgeon: Wilberto Bowen MD;  Location: [facility] OR;  Service: General   [3]   Social History  Tobacco Use   Smoking Status Never   Smokeless Tobacco Never   [4]   Family History  Problem Relation Name Age of Onset    Depression Son     [5]   Allergies  Allergen Reactions    Bactrim [Sulfamethoxazole-Trimethoprim] Diarrhea     vomiting    Pravastatin Palpitations

## 2025-06-03 NOTE — CASE MANAGEMENT
Case Management Assessment & Discharge Planning Note    Patient name Otis Robledo  Location ICU 13/ICU 13 MRN [id number]  : 1965 Date 6/3/2025       Current Admission Date: 2025  Current Admission Diagnosis:Stroke (HCC)   Patient Active Problem List    Diagnosis Date Noted    NUZHAT (acute kidney injury) (HCC) 2025    Stroke (HCC) 2025    Gynecomastia, male 10/22/2021    Drug-induced erectile dysfunction 2021    PVC's (premature ventricular contractions) 2020    Paroxysmal atrial fibrillation (HCC) 2020    Premature ejaculation 2020    Direct inguinal hernia 2014    Dyslipidemia 2014    Esophagitis, reflux 2013    Anxiety 2012    Obstructive sleep apnea 2012    Depression 08/10/2012    Benign essential hypertension 08/15/2008      LOS (days): 1  Geometric Mean LOS (GMLOS) (days):   Days to GMLOS:     OBJECTIVE:    Risk of Unplanned Readmission Score: 9.02         Current admission status: Inpatient       Preferred Pharmacy:   [facility] [address] [place] [place] - [address] [facility].  [address] [place] [address]  Phone: [phone] Fax: [phone]    Primary Care Provider: Marsha Eduardo DO    Primary Insurance: [facility]  Secondary Insurance:     ASSESSMENT:  Active Health Care Proxies    There are no active Health Care Proxies on file.       Advance Directives  Does patient have a Health Care POA?: No  Does patient currently have a Health Care decision maker?: Yes, please see Health Care Proxy section  Does patient have Advance Directives?: No  Primary Contact: wife Laura Robledo         Readmission Root Cause  30 Day Readmission: No    Patient Information  Admitted from:: Home  Mental Status: Alert  During Assessment patient was accompanied by: Spouse, Son  Assessment information provided by:: Patient, Spouse  Primary Caregiver: Self  Support Systems: Spouse/significant other, Son, Family members  [facility]  Residence: [place]  [place] you [place]  Home entry access options. Select all that apply.: Stairs  Number of steps to enter home.: 1  Type of Current Residence: 2 story home  Upon entering residence, is there a bedroom on the main floor (no further steps)?: No  A bedroom is located on the following floor levels of residence (select all that apply):: 2nd Floor  Upon entering residence, is there a bathroom on the main floor (no further steps)?: Yes  Number of steps to 2nd floor from main floor: One Flight  Living Arrangements: Lives w/ Spouse/significant other, Lives w/ Son  Is patient a ?: No    Activities of Daily Living Prior to Admission  Functional Status: Independent  Completes ADLs independently?: Yes  Ambulates independently?: Yes  Does patient use assisted devices?: Yes  Assisted Devices (DME) used: CPAP  Does patient currently own DME?: Yes  What DME does the patient currently own?: CPAP  Does patient have a history of Outpatient Therapy (PT/OT)?: No  Does the patient have a history of Short-Term Rehab?: No  Does patient have a history of HHC?: No  Does patient currently have HHC?: No         Patient Information Continued  Income Source: Employed  Does patient have prescription coverage?: Yes  Can the patient afford their medications and any related supplies (such as glucometers or test strips)?: Yes  Does patient receive dialysis treatments?: No  Does patient have a history of substance abuse?: No  Does patient have a history of Mental Health Diagnosis?: Yes (anxiety, depression)  Is patient receiving treatment for mental health?:  (NO)  Has patient received inpatient treatment related to mental health in the last 2 years?: No         Means of [facility]          DISCHARGE DETAILS:    Discharge planning discussed with:: pt and wife--no HHC needs  Freedom of Choice: Yes     CM contacted family/caregiver?: Yes  Were Treatment Team  discharge recommendations reviewed with patient/caregiver?: Yes  Did patient/caregiver verbalize understanding of patient care needs?: Yes  Were patient/caregiver advised of the risks associated with not following Treatment Team discharge recommendations?: Yes    Contacts  Patient Contacts: wife Laura Robledo (is a nurse)  Relationship to Patient:: Family  Contact Method: Phone  Phone Number: [phone]  Reason/Outcome: Continuity of Care, Emergency Contact, Discharge Planning    Requested Home Health Care         Is the patient interested in HHC at discharge?: No    DME Referral Provided  Referral made for DME?: No    Other Referral/Resources/Interventions Provided:  Referral Comments: no HHC needs at this time    Would you like to participate in our [facility] service program?  : No - Declined    Treatment Team Recommendation: Home  Discharge Destination Plan:: Home  Transport at Discharge : Family                                   Family notified:: wife and son here  Additional Comments: 58yo male admitted with stroke. MRI shows acute cortical infarct left frontal lobe. At this time has no neuro deficits and GCS 15. Uses CPAP hs. On arrival to ER had left sided weakness and was given TNK, symptoms resolved. Pt. has afib but is not on AC. He is Pre-DM. Lives with wife and son and works full time. No anticipated HHC needs at this time. To have repeat CT of head this evening. Possible d/c home tonight or tomorrow.

## 2025-06-03 NOTE — PLAN OF CARE
Problem: OCCUPATIONAL THERAPY ADULT  Goal: Performs self-care activities at highest level of function for planned discharge setting.  See evaluation for individualized goals.  Description: Treatment Interventions: ADL retraining, Functional transfer training, Endurance training, Cognitive reorientation, Patient/family training, Equipment evaluation/education, Compensatory technique education, Continued evaluation, Energy conservation, Activityengagement          See flowsheet documentation for full assessment, interventions and recommendations.   Note: Limitation: Decreased ADL status, Decreased Safe judgement during ADL, Decreased cognition, Decreased endurance, Decreased self-care trans, Decreased high-level ADLs  Prognosis: Fair  Assessment: Pt is a 60 y/o M who presents to [facility] for evaluation of R facial droop and aphasia, dx'd w/ acute stroke. Pt s/p TNK administration . Pt  has a past medical history of Arthritis, Depression, Extrinsic asthma, Exudative pharyngitis, GERD (gastroesophageal reflux disease), Headache(784.0), Hypertension, and Visual impairment. At baseline, pt lives w/ spouse and children in a 2SH w/ 2-3 ANDRES. Pt previously I w/ ADLs and IADLs and I w/ functional mobility and transfers. Pt drives. Pt has active OT consult and OOB orders. Upon OT entry, pt found seated in the recliner and agreeable to participate in therapy after increased education. Pt is currently performing at S for UB ADLs, S for LB ADLs, and S for functional mobility and transfers. Upon OT evaluation, pt demonstrates impairments w/ balance, endurance, attention, standing tolerance, activity tolerance, and cognition. OT to address the listed impairments in the following occupational performance areas: dressing, bathing, toileting, functional mobility, transfers, and cognition. Pt is currently functioning below baseline performance and demonstrates need for OT services. Pt will be seen 2-3x/week w/ goals to  within  10-14 days. OT to recommend minimum resource intensity upon d/c.     Rehab Resource Intensity Level, OT: III (Minimum Resource Intensity) (OP OT)     Alysa Dyson, OTS

## 2025-06-04 ENCOUNTER — APPOINTMENT (INPATIENT)
Dept: RADIOLOGY | Facility: HOSPITAL | Age: 60
DRG: 062 | End: 2025-06-04
Payer: COMMERCIAL

## 2025-06-04 LAB
ANION GAP SERPL CALCULATED.3IONS-SCNC: 3 MMOL/L (ref 4–13)
BUN SERPL-MCNC: 16 MG/DL (ref 5–25)
CA-I BLD-SCNC: 1.17 MMOL/L (ref 1.12–1.32)
CALCIUM SERPL-MCNC: 9.1 MG/DL (ref 8.4–10.2)
CHLORIDE SERPL-SCNC: 107 MMOL/L (ref 96–108)
CO2 SERPL-SCNC: 28 MMOL/L (ref 21–32)
CREAT SERPL-MCNC: 0.93 MG/DL (ref 0.6–1.3)
ERYTHROCYTE [DISTWIDTH] IN BLOOD BY AUTOMATED COUNT: 13.4 % (ref 11.6–15.1)
GFR SERPL CREATININE-BSD FRML MDRD: 90 ML/MIN/1.73SQ M
GLUCOSE SERPL-MCNC: 102 MG/DL (ref 65–140)
HCT VFR BLD AUTO: 49.7 % (ref 36.5–49.3)
HGB BLD-MCNC: 17 G/DL (ref 12–17)
MAGNESIUM SERPL-MCNC: 2.2 MG/DL (ref 1.9–2.7)
MCH RBC QN AUTO: 30.5 PG (ref 26.8–34.3)
MCHC RBC AUTO-ENTMCNC: 34.2 G/DL (ref 31.4–37.4)
MCV RBC AUTO: 89 FL (ref 82–98)
PHOSPHATE SERPL-MCNC: 3 MG/DL (ref 2.7–4.5)
PLATELET # BLD AUTO: 189 THOUSANDS/UL (ref 149–390)
PMV BLD AUTO: 9.7 FL (ref 8.9–12.7)
POTASSIUM SERPL-SCNC: 4.2 MMOL/L (ref 3.5–5.3)
RBC # BLD AUTO: 5.58 MILLION/UL (ref 3.88–5.62)
SODIUM SERPL-SCNC: 138 MMOL/L (ref 135–147)
WBC # BLD AUTO: 8.46 THOUSAND/UL (ref 4.31–10.16)

## 2025-06-04 PROCEDURE — 85027 COMPLETE CBC AUTOMATED: CPT | Performed by: PHYSICIAN ASSISTANT

## 2025-06-04 PROCEDURE — 70450 CT HEAD/BRAIN W/O DYE: CPT

## 2025-06-04 PROCEDURE — 97535 SELF CARE MNGMENT TRAINING: CPT

## 2025-06-04 PROCEDURE — 82330 ASSAY OF CALCIUM: CPT | Performed by: PHYSICIAN ASSISTANT

## 2025-06-04 PROCEDURE — 83735 ASSAY OF MAGNESIUM: CPT | Performed by: PHYSICIAN ASSISTANT

## 2025-06-04 PROCEDURE — 84100 ASSAY OF PHOSPHORUS: CPT | Performed by: PHYSICIAN ASSISTANT

## 2025-06-04 PROCEDURE — 97116 GAIT TRAINING THERAPY: CPT

## 2025-06-04 PROCEDURE — 99232 SBSQ HOSP IP/OBS MODERATE 35: CPT | Performed by: EMERGENCY MEDICINE

## 2025-06-04 PROCEDURE — 80048 BASIC METABOLIC PNL TOTAL CA: CPT | Performed by: PHYSICIAN ASSISTANT

## 2025-06-04 PROCEDURE — 97129 THER IVNTJ 1ST 15 MIN: CPT

## 2025-06-04 PROCEDURE — NC001 PR NO CHARGE: Performed by: PHYSICIAN ASSISTANT

## 2025-06-04 RX ORDER — ENOXAPARIN SODIUM 100 MG/ML
40 INJECTION SUBCUTANEOUS
Status: DISCONTINUED | OUTPATIENT
Start: 2025-06-04 | End: 2025-06-05

## 2025-06-04 RX ORDER — BISOPROLOL FUMARATE 5 MG/1
20 TABLET, FILM COATED ORAL DAILY
Status: DISCONTINUED | OUTPATIENT
Start: 2025-06-04 | End: 2025-06-05 | Stop reason: HOSPADM

## 2025-06-04 RX ORDER — LABETALOL HYDROCHLORIDE 5 MG/ML
10 INJECTION, SOLUTION INTRAVENOUS ONCE
Status: COMPLETED | OUTPATIENT
Start: 2025-06-04 | End: 2025-06-04

## 2025-06-04 RX ADMIN — PANTOPRAZOLE SODIUM 40 MG: 40 TABLET, DELAYED RELEASE ORAL at 05:55

## 2025-06-04 RX ADMIN — ENOXAPARIN SODIUM 40 MG: 40 INJECTION SUBCUTANEOUS at 12:57

## 2025-06-04 RX ADMIN — ACETAMINOPHEN 650 MG: 325 TABLET ORAL at 08:12

## 2025-06-04 RX ADMIN — LABETALOL HYDROCHLORIDE 10 MG: 5 INJECTION, SOLUTION INTRAVENOUS at 23:19

## 2025-06-04 RX ADMIN — ACETAMINOPHEN 650 MG: 325 TABLET ORAL at 12:53

## 2025-06-04 RX ADMIN — ACETAMINOPHEN 650 MG: 325 TABLET ORAL at 19:50

## 2025-06-04 RX ADMIN — ATORVASTATIN CALCIUM 40 MG: 40 TABLET, FILM COATED ORAL at 17:20

## 2025-06-04 RX ADMIN — LABETALOL HYDROCHLORIDE 10 MG: 5 INJECTION, SOLUTION INTRAVENOUS at 22:48

## 2025-06-04 RX ADMIN — BISOPROLOL FUMARATE 20 MG: 5 TABLET ORAL at 12:54

## 2025-06-04 NOTE — PHYSICAL THERAPY NOTE
PHYSICAL THERAPY TREATMENT  NAME:  Otis Robledo  DATE: 06/04/25    AGE:   59 y.o.  Mrn:   [id number]  ADMIT DX:  Stroke (HCC) [I63.9]  Stroke-like symptom [R29.90]    Past Medical History[1]  Past Surgical History[2]    Length Of Stay: 2     06/04/25 1409   PT Last Visit   PT Visit Date 06/04/25   Note Type   Note Type Treatment   Pain Assessment   Pain Assessment Tool 0-10   Pain Score No Pain   Restrictions/Precautions   Other Precautions Multiple lines;Chair Alarm;Bed Alarm;Telemetry   Cognition   Overall Cognitive Status WFL   Arousal/Participation Alert;Responsive   Attention Within functional limits   Orientation Level Oriented X4   Following Commands Follows all commands and directions without difficulty   Comments pleasant and  coopertive  throughout session   Subjective   Subjective pt w/ spouse present in room; willing and agreeable to PT session. spouse reports pt back to baseline and not noting any physical or cognitive defiicts   Bed Mobility   Supine to Sit 7  Independent   Additional Comments OOB in recliner on PT exit- left in care of OT for MOCA   Transfers   Sit to Stand 6  Modified independent   Stand to Sit 6  Modified independent   Additional Comments w.o AD- improved safety and mobility- not impulsive this date   Ambulation/Elevation   Gait pattern WNL   Gait Assistance 7  Independent  (S> indep w/o AD 0 dizziness ; 0 LOB;)   Assistive Device None   Distance > 600' total; no AD; varied speed walking; head turns; toe / heel walking; sidestepping; all w/o AD; pt able to P/U objects from floor w/o LOB;   Stair Management Assistance 7  Independent   Stair Management Technique One rail R;Alternating pattern;Foreward   Number of Stairs 10   Balance   Static Sitting Normal   Dynamic Sitting Good   Static Standing Good   Dynamic Standing Good   Ambulatory Good   Endurance Deficit   Endurance Deficit No   Activity Tolerance   Activity Tolerance Patient tolerated treatment well   Medical Staff Made  Aware OT RN and CM for d/ c planning   Nurse Made Aware yes- Cordelia   Exercises   Balance training  heel toe walking; sidestepping; backward walking 15' each   Assessment   Prognosis Good   Assessment Pt indep w/ all levels of mobility; spouse present for session; reprots pt back to baseline; has no concerns for d/c . pt able to negotiate stairs w/o assist and ambualte w/o AD > 600' indep. PT signing off. no further skilled inpt PT indicated. Pt also w/o impulsivity today; pleasant and cooperative throughout w/ good safety noted- again sposue noting no changes from baseline.   Barriers to Discharge Other (Comment)   Goals   Patient Goals go home   Discharge Recommendation   Rehab Resource Intensity Level, PT No post-acute rehabilitation needs   AM-PAC Basic Mobility Inpatient   Turning in Flat Bed Without Bedrails 4   Lying on Back to Sitting on Edge of Flat Bed Without Bedrails 4   Moving Bed to Chair 4   Standing Up From Chair Using Arms 4   Walk in Room 4   Climb 3-5 Stairs With Railing 4   Basic Mobility Inpatient Raw Score 24   Basic Mobility Standardized Score 57.68   [facility] Highest Level Of Mobility   -HLM Goal 8: Walk 250 feet or more   -HLM Achieved 8: Walk 250 feet ot more   End of Consult   Patient Position at End of Consult Bedside chair;All needs within reach         The patient's AM-PAC Basic Mobility Inpatient Short Form Raw Score is 24. A Raw score of greater than 16 suggests the patient may benefit from discharge to home. Please also refer to the recommendation of the Physical Therapist for safe discharge planning.          Linda Concepcion, PT        [1]   Past Medical History:  Diagnosis Date    Arthritis     Depression     Extrinsic asthma 08/15/2008    Exudative pharyngitis 05/20/2020    GERD (gastroesophageal reflux disease)     Headache(784.0)     Hypertension     Visual impairment    [2]   Past Surgical History:  Procedure Laterality Date    COLONOSCOPY      EYE SURGERY       FACIAL/NECK BIOPSY N/A 08/14/2018    Procedure: EXCISION BIOPSY LESION POSTERIOR NECK;  Surgeon: Wilberto Bowen MD;  Location: [facility];  Service: General

## 2025-06-04 NOTE — OCCUPATIONAL THERAPY NOTE
Occupational Therapy Treatment Note      Otis Robledo    6/4/2025    Principal Problem:    Stroke (HCC)  Active Problems:    Obstructive sleep apnea    Benign essential hypertension    Dyslipidemia    Esophagitis, reflux    Paroxysmal atrial fibrillation (HCC)    NUZHAT (acute kidney injury) (HCC)      Past Medical History[1]    Past Surgical History[2]       06/04/25 1420   OT Last Visit   OT Visit Date 06/04/25   Note Type   Note Type Treatment   Pain Assessment   Pain Assessment Tool 0-10   Pain Score No Pain   Restrictions/Precautions   Weight Bearing Precautions Per Order No   Other Precautions Chair Alarm;Bed Alarm;Multiple lines;Telemetry   Lifestyle   Autonomy I w/ ADLs, I w/ IADLs, I w/ functional mobility and transfers   Reciprocal Relationships Wife, children   Service to Others Full-time employment   Intrinsic Gratification Watching TV   ADL   LB Bathing Assistance 6  Modified Independent   LB Bathing Comments Pt mod I to don/doff socks while seated EOB   Bed Mobility   Supine to Sit 6  Modified independent   Sit to Supine 6  Modified independent   Transfers   Sit to Stand 6  Modified independent   Stand to Sit 6  Modified independent   Additional Comments Pt mod I for STS transfers, demonstrates improved safety and lack of impulsivity this session   Functional Mobility   Functional Mobility 6  Modified independent   Additional Comments Pt mod I to ambulate household distance   Cognition   Overall Cognitive Status WFL   Arousal/Participation Alert;Responsive;Cooperative   Attention Within functional limits   Orientation Level Oriented X4   Memory Within functional limits   Following Commands Follows all commands and directions without difficulty   Comments Pt pleasant and cooperative this session   Cognition Assessment Tools MOCA   Score 28   Assessment   Assessment Pt seen for OT treatment session this pm w/ focus on LB dressing, transfers, standing tolerance, functional mobility, activity tolerance, and  formal cognitive assessment. Upon OT arrival, pt found supine in bed and agreeable to participate in therapy. Pt mod I for LB dressing, transfers, and functional mobility. Pt demonstrates increased safety awareness and decreased impulsivity w/ pt's wife noting he is back to his baseline. Pt cooperative throughout MoCA administration, scoring a 28/30, indicating pt has no cognitive impairment. Pt returned back to bed w/ all needs within reach at EOS. Pt w/ no questions or concerns about d/c. OT to recommend d/c home w/ increased family support. OT to d/c 6/4/25.   Plan   Treatment Interventions ADL retraining;Functional transfer training;Endurance training;Patient/family training;Equipment evaluation/education;Compensatory technique education;Continued evaluation;Energy conservation;Activityengagement   Goal Expiration Date 06/17/25   OT Treatment Day 1   OT Frequency 2-3x/wk   Discharge Recommendation   Rehab Resource Intensity Level, OT No post-acute rehabilitation needs   Additional Comments  Pt seen as a co-session due to the patient's co-morbidities, clinically unstable presentation, and present impairments which are a regression from the patient's baseline.   Additional Comments 2 The patient's raw score on the AM-PAC Daily Activity Inpatient Short Form is 24. A raw score of greater than or equal to 19 suggests the patient may benefit from discharge to home. Please refer to the recommendation of the Occupational Therapist for safe discharge planning.   AM-PAC Daily Activity Inpatient   Lower Body Dressing 4   Bathing 4   Toileting 4   Upper Body Dressing 4   Grooming 4   Eating 4   Daily Activity Raw Score 24   Daily Activity Standardized Score (Calc for Raw Score >=11) 57.54   AM-PAC Applied Cognition Inpatient   Following a Speech/Presentation 4   Understanding Ordinary Conversation 4   Taking Medications 4   Remembering Where Things Are Placed or Put Away 4   Remembering List of 4-5 Errands 4   Taking Care  of Complicated Tasks 3   Applied Cognition Raw Score 23   Applied Cognition Standardized Score 53.08   MOCA   Version 7.1   Visuopatial/Executive 4   Naming 3   Memory 0   Attention: Digits 2   Attention: Letters 1   Attention: Serial 3   Language: Repeat 2   Language: Fluency 1   Abstraction 2   Delayed Recall 4   Orientation 6   Does patient have less than or equal to 12 years of education? 0   MOCA Total Score 28   MOCA Comments Pt cooperative throughout assessment   Barthel Index   Feeding 10   Bathing 5   Grooming Score 5   Dressing Score 10   Bladder Score 10   Bowels Score 10   Toilet Use Score 10   Transfers (Bed/Chair) Score 15   Mobility (Level Surface) Score 15   Stairs Score 10   Barthel Index Score 100   End of Consult   Education Provided Yes   Patient Position at End of Consult Bedside chair;Bed/Chair alarm activated;All needs within reach   Nurse Communication Nurse aware of consult     RADHA Tucker         [1]   Past Medical History:  Diagnosis Date    Arthritis     Depression     Extrinsic asthma 08/15/2008    Exudative pharyngitis 05/20/2020    GERD (gastroesophageal reflux disease)     Headache(784.0)     Hypertension     Visual impairment    [2]   Past Surgical History:  Procedure Laterality Date    COLONOSCOPY      EYE SURGERY      FACIAL/NECK BIOPSY N/A 08/14/2018    Procedure: EXCISION BIOPSY LESION POSTERIOR NECK;  Surgeon: Wilberto Bowen MD;  Location:  MAIN OR;  Service: General

## 2025-06-04 NOTE — PLAN OF CARE
Problem: PHYSICAL THERAPY ADULT  Goal: Performs mobility at highest level of function for planned discharge setting.  See evaluation for individualized goals.  Description: Treatment/Interventions: LE strengthening/ROM, Functional transfer training, Therapeutic exercise, Endurance training, Patient/family training, Spoke to nursing, OT          See flowsheet documentation for full assessment, interventions and recommendations.  Outcome: Adequate for Discharge  Note: Prognosis: Good  Problem List: Decreased safety awareness, Impaired judgement, Decreased cognition, Decreased mobility  Assessment: Pt indep w/ all levels of mobility; spouse present for session; reprots pt back to baseline; has no concerns for d/c . pt able to negotiate stairs w/o assist and ambualte w/o AD > 600' indep. PT signing off. no further skilled inpt PT indicated. Pt also w/o impulsivity today; pleasant and cooperative throughout w/ good safety noted- again sposue noting no changes from baseline.  Barriers to Discharge: Other (Comment)     Rehab Resource Intensity Level, PT: No post-acute rehabilitation needs    See flowsheet documentation for full assessment.

## 2025-06-04 NOTE — UTILIZATION REVIEW
Continued Stay Review    Date: 6/4/25                           Current Patient Class: Inpatient  Current Level of Care: ICU    HPI:59 y.o. male initially admitted on 6/2/25    Current Diagnosis: Stroke    Assessment/Plan: Critical Care/ICU: S/p TNK 6/02 . 6/03 CT head wo; s/p 24 hr TNK: Known small cortical infarct in left frontal lobe was better evaluated on earlier same day MRI brain without contrast. No acute intracranial hemorrhage. 6/03 MRI brain wo: Single small acute cortical infarction involving the left frontal lobe premotor cortex region. Anxious overnight about his blood pressure, otherwise no acute events. + headache 3/10. BP well controlled today.  HR ranging between 48-56. UOP 2550 ml. Lipid panel cholesterol 220, triglyceride 228, HDL 36, . 6/03 Echo: mild concentric hypertrophy, LVEF 70%, hyperdynamic systolic function, no PFO, ascending aorta mildly dilated. Adding Labetalol 10 mg Q4 prn to keep -180, MAP > 65. Holding home medications: bisoprolol- HCTZ 20-12.5, ASA 81. Plan: c/w stroke pathway, Telemetry monitoring, Neuro checks Q4, Continuous pulse oximetry. Maintain O2 sat >92%, monitor and replete electrolytes, AC on hold, Analgesia: Multimodal. APAP 650 mg q6hr prn.     PMR consult: PT/OT are pending however patient reports being back to baseline and has no focal neurological deficits. He will likely be able to d/c to home once medically stable.     PT/OT eval dc recommendation for level 3 minimum resource intensity, AM PAC 19.     Neurology: Found to have objective evidence of Atrial fibrillation on EKG, placing pt on AC.       Medications:   Scheduled Medications:  atorvastatin, 40 mg, Oral, QPM  [Held by provider] bisoprolol, 20 mg, Oral, Daily   And  [Held by provider] hydroCHLOROthiazide, 12.5 mg, Oral, Daily  pantoprazole, 40 mg, Oral, Early Morning      Continuous IV Infusions: none      PRN Meds:  acetaminophen, 650 mg, Oral, Q6H PRN  labetalol, 10 mg, Intravenous, Q4H  PRN  ondansetron, 4 mg, Intravenous, Q6H PRN      Discharge Plan: TBD     Vital Signs (last 3 days)       Date/Time Temp Pulse Resp BP MAP (mmHg) SpO2 O2 Device Patient Position - Orthostatic VS [place] Coma Scale Score Pain    06/04/25 0812 -- -- -- -- -- -- -- -- -- 3    06/04/25 0700 -- 56 14 140/90 110 96 % -- -- 15 --    06/04/25 0600 -- 56 21 -- -- 95 % -- -- 15 --    06/04/25 0500 97.7 °F (36.5 °C) 50 20 136/75 97 96 % CPAP Lying 15 --    06/04/25 0400 -- 50 20 124/75 91 97 % CPAP Lying 15 --    06/04/25 0300 -- 48 17 118/82 96 97 % CPAP Lying 15 --    06/04/25 0200 -- 52 10 134/98 120 96 % CPAP Lying 15 --    06/04/25 0100 -- 50 16 110/69 75 96 % CPAP Lying 15 --    06/04/25 0000 -- 58 11 133/76 93 96 % CPAP Lying 15 --    06/03/25 2300 -- 64 25 129/69 100 95 % CPAP Lying 15 --    06/03/25 2200 -- 58 13 129/74 94 96 % None (Room air) Lying 15 --    06/03/25 2100 -- 66 20 132/69 92 94 % None (Room air) Lying 15 --    06/03/25 2000 99.4 °F (37.4 °C) 68 20 159/100 122 96 % None (Room air) Lying 15 No Pain    06/03/25 1930 -- -- -- -- -- -- -- -- 15 --    06/03/25 1700 -- 64 25 146/80 102 94 % -- -- 15 --    06/03/25 1600 97.8 °F (36.6 °C) 66 -- 158/85 111 94 % -- -- 15 --    06/03/25 1500 -- 74 20 156/84 118 93 % -- -- 15 --    06/03/25 1430 -- 74 -- 156/84 -- -- -- -- -- --    06/03/25 1400 -- 68 18 139/80 102 94 % None (Room air) Lying 15 --    06/03/25 1300 -- 60 16 145/86 109 95 % -- -- 15 --    06/03/25 1200 98 °F (36.7 °C) 60 17 152/66 99 94 % None (Room air) Lying 15 --    06/03/25 1100 -- 68 19 167/95 125 94 % None (Room air) Sitting 15 --    06/03/25 1009 -- -- -- -- -- -- -- -- -- No Pain    06/03/25 1008 -- -- -- -- -- -- -- -- -- No Pain    06/03/25 1000 -- 70 22 165/96 123 94 % None (Room air) Sitting 15 --    06/03/25 0900 -- 62 16 137/86 107 94 % None (Room air) Sitting 15 --    06/03/25 0800 97.7 °F (36.5 °C) 72 20 148/96 116 92 % None (Room air) Lying 15 2    06/03/25 0700 -- 60 13 119/77 93 94  % -- -- 15 --    06/03/25 0600 -- 58 13 120/73 88 94 % -- -- 15 --    06/03/25 0500 -- 64 62 -- -- 95 % -- -- 15 --    06/03/25 0439 -- 62 15 131/79 97 93 % -- -- -- --    06/03/25 0415 -- 68 59 158/80 107 93 % -- -- -- --    06/03/25 0400 -- -- -- -- -- -- -- -- 15 --    06/03/25 0315 -- 70 29 160/85 110 96 % -- -- -- --    06/03/25 0300 -- -- -- -- -- -- -- -- 15 --    06/03/25 0245 -- 72 17 140/86 107 93 % -- -- -- --    06/03/25 0230 -- -- -- -- -- -- -- -- 15 --    06/03/25 0215 -- 72 18 143/79 99 94 % -- -- -- --    06/03/25 0200 -- -- -- -- -- -- -- -- 15 --    06/03/25 0145 -- 70 16 134/77 95 97 % -- -- -- --    06/03/25 0130 -- -- -- -- -- -- -- -- 15 --    06/03/25 0115 -- 70 21 143/78 101 95 % -- -- -- --    06/03/25 0100 -- -- -- -- -- -- -- -- 15 --    06/03/25 0045 -- 74 30 151/92 112 97 % -- -- -- --    06/03/25 0030 -- -- -- -- -- -- -- -- 15 --    06/03/25 0000 -- -- -- -- -- -- -- -- 15 --    06/02/25 2345 -- 76 21 162/94 122 97 % -- -- -- --    06/02/25 2330 -- 68 41 151/89 117 98 % -- -- 15 --    06/02/25 2315 -- 72 67 143/84 105 98 % -- -- -- --    06/02/25 2300 -- 70 24 146/87 108 97 % -- -- 15 --    06/02/25 2245 -- 70 22 163/96 121 97 % -- -- -- --    06/02/25 2230 -- 70 24 159/99 121 97 % -- -- 15 --    06/02/25 2215 -- 70 32 158/96 127 95 % -- -- -- --    06/02/25 2200 -- 74 40 154/82 118 96 % -- -- 15 --    06/02/25 2145 -- 72 23 165/95 121 96 % -- -- 15 --    06/02/25 2130 -- 72 22 157/87 112 98 % -- -- 15 2    06/02/25 2100 -- 76 107 175/107 132 97 % None (Room air) Lying 15 --    06/02/25 2045 -- 76 22 175/107 132 97 % None (Room air) Lying 15 --    06/02/25 2030 -- 70 18 165/102 -- -- -- Lying 15 --    06/02/25 2015 -- 77 18 165/106 -- 96 % None (Room air) -- 15 --    06/02/25 2000 -- 78 18 174/96 -- 96 % None (Room air) -- 15 --    06/02/25 1945 -- 88 18 169/90 -- 99 % None (Room air) -- 14 --    06/02/25 1936 97.8 °F (36.6 °C) 85 18 175/83 -- 96 % -- -- -- --    06/02/25 1930 -- 88  100 166/83 117 94 % -- -- -- --    06/02/25 19:18:32 -- 87 18 206/95 -- 96 % None (Room air) -- 10 --    06/02/25 19:17:20 -- -- -- -- -- 95 % -- -- -- --    06/02/25 1910 -- 91 20 217/101 -- 95 % None (Room air) -- 10 --          Weight (last 2 days)       Date/Time Weight    06/03/25 1430 102 (225)    06/03/25 0800 102 (225)    06/02/25 1906 102 (225.09)            Pertinent Labs/Diagnostic Results:   Radiology:  CT head wo contrast   Final Interpretation by Wilmer Ford MD (06/03 2048)      Known small cortical infarct in left frontal lobe was better evaluated on earlier same day MRI brain without contrast. No acute intracranial hemorrhage.                  Workstation performed: [id number]         MRI brain wo contrast   Final Interpretation by Chirag Stafford MD (06/03 0548)      Single small acute cortical infarction involving the left frontal lobe premotor cortex region.      The study was marked in EPIC for immediate notification.      Workstation performed: [id number]         CTA stroke alert (head/neck)   Final Interpretation by Johnny Fitch MD (06/02 1936)      No acute process on CTA neck and brain.               Findings were directly discussed with Dr. Eason at 7:32 p.m. on 6/2/2025.      Workstation performed: [id number]         CT stroke alert brain   Final Interpretation by Johnny Fitch MD (06/02 1922)      No acute intracranial abnormality.      Findings were directly discussed with Dr. Wilson at approximately 7:21 p.m. on 6/2/2025.      Workstation performed: [id number]           Cardiology:  Echo complete w/ contrast if indicated   Final Result by Catrachita Earl MD (06/03 1628)        Left Ventricle: Left ventricular cavity size is normal. Wall thickness    is normal. There is mild concentric hypertrophy. The left ventricular    ejection fraction is 70%. Systolic function is hyperdynamic with a mild    intracavitary gradient around 15 mmHg without a significant rise  with    Valsalva. Wall motion is normal. Diastolic function is normal.     Right Ventricle: Right ventricular cavity size is normal. Systolic    function is normal.     Atrial Septum: No patent foramen ovale detected, at rest using agitated    saline contrast, by provocation with cough, using agitated saline contrast    and with valsalva, using agitated saline contrast.     Aorta: The aortic root is normal in size. The ascending aorta is mildly    dilated. The aortic root is 3.40 cm. The ascending aorta is 4.1 cm.         ECG 12 lead   Final Result by Rafal Francis MD (06/03 1013)   Normal sinus rhythm   Voltage criteria for left ventricular hypertrophy   Abnormal ECG   When compared with ECG of 02-Jun-2025 19:59, (unconfirmed)   No significant change was found   Confirmed by Rafal Francis ([id number]) on 6/3/2025 10:13:37 AM      ECG 12 lead   Final Result by Rafal Francis MD (06/03 1013)   Normal sinus rhythm   Voltage criteria for left ventricular hypertrophy   Abnormal ECG   When compared with ECG of 04-Aug-2020 17:59,   Inverted T waves have replaced nonspecific T wave abnormality in Inferior    leads   Confirmed by Rafal Francis ([id number]) on 6/3/2025 10:13:34 AM            Results from last 7 days   Lab Units 06/04/25  0555 06/03/25  0511 06/02/25 1914   WBC Thousand/uL 8.46 11.10* 14.95*   HEMOGLOBIN g/dL 17.0 16.3 17.9*   HEMATOCRIT % 49.7* 48.9 53.8*   PLATELETS Thousands/uL 189 195 274   TOTAL NEUT ABS Thousands/µL  --  7.38  --          Results from last 7 days   Lab Units 06/04/25  0555 06/03/25  0511 06/02/25 1914   SODIUM mmol/L 138 139 139   POTASSIUM mmol/L 4.2 3.7 3.1*   CHLORIDE mmol/L 107 103 101   CO2 mmol/L 28 27 25   ANION GAP mmol/L 3* 9 13   BUN mg/dL 16 20 25   CREATININE mg/dL 0.93 0.97 1.48*   EGFR ml/min/1.73sq m 90 85 51   CALCIUM mg/dL 9.1 8.7 9.8   CALCIUM, IONIZED mmol/L 1.17  --   --    MAGNESIUM mg/dL 2.2 1.7*  --    PHOSPHORUS mg/dL 3.0 3.5  --      Results from last 7  days   Lab Units 06/03/25  0511   AST U/L 20   ALT U/L 32   ALK PHOS U/L 52   TOTAL PROTEIN g/dL 6.5   ALBUMIN g/dL 3.9   TOTAL BILIRUBIN mg/dL 1.03*     Results from last 7 days   Lab Units 06/02/25  1913   POC GLUCOSE mg/dl 117     Results from last 7 days   Lab Units 06/04/25  0555 06/03/25  0511 06/02/25  1914   GLUCOSE RANDOM mg/dL 102 104 140         Results from last 7 days   Lab Units 06/03/25  0511   HEMOGLOBIN A1C % 6.0*   EAG mg/dl 126             Results from last 7 days   Lab Units 06/02/25  2320 06/02/25  2059 06/02/25  1914   HS TNI 0HR ng/L  --   --  5   HS TNI 2HR ng/L  --  6  --    HSTNI D2 ng/L  --  1  --    HS TNI 4HR ng/L 9  --   --    HSTNI D4 ng/L 4  --   --          Results from last 7 days   Lab Units 06/02/25  1914   PROTIME seconds 13.0   INR  0.95   PTT seconds 25     Results from last 7 days   Lab Units 06/03/25  0511   TSH 3RD GENERATON uIU/mL 2.141         Network Utilization Review Department  ATTENTION: Please call with any questions or concerns to [phone] and carefully listen to the prompts so that you are directed to the right person. All voicemails are confidential.   For Discharge needs, contact Care Management DC Support Team at [phone] opt. [phone]  Send all requests for admission clinical reviews, approved or denied determinations and any other requests to dedicated fax number below belonging to the campus where the patient is receiving treatment. List of dedicated fax numbers for the Facilities:  FACILITY NAME UR FAX NUMBER   ADMISSION DENIALS (Administrative/Medical Necessity) [phone]   DISCHARGE SUPPORT TEAM (NETWORK) [phone]   PARENT CHILD HEALTH (Maternity/NICU/Pediatrics) [phone]   [facility] [phone]   [facility] [phone]   [facility] [phone]   [facility] [phone]   [facility] [phone]   [facility] [phone]   General acute hospital [phone]   [facility] [phone]   [facility] [phone]   [facility] [phone]   [facility] [phone]   [facility] [phone]

## 2025-06-04 NOTE — QUICK NOTE
Patient found to have objective evidence of afib on EKG, will be placed on AC, timing could start 3 days after onset.     Modify other risk factors, nahid, hyperlipidemia, htn.     Follow up with vascular neurolgy in 4-6 wks.

## 2025-06-04 NOTE — PROGRESS NOTES
Progress Note - Critical Care/ICU   Name: Otis Robledo 59 y.o. male I MRN: [id number]  Unit/Bed#: ICU 13 I Date of Admission: 2025   Date of Service: 2025 I Hospital Day: 2      Assessment & Plan   Active Hospital Problems    Diagnosis Date Noted POA    Stroke (HCC) 2025 Unknown    NUZHAT (acute kidney injury) (formerly [facility]) 2025 Unknown    Paroxysmal atrial fibrillation (HCC) 2020 Yes    Dyslipidemia 2014 Yes    Esophagitis, reflux 2013 Yes    Obstructive sleep apnea 2012 Yes    Benign essential hypertension 08/15/2008 Yes      Resolved Hospital Problems   No resolved problems to display.     Neuro/Psych:  Diagnosis: Acute stroke w/ aphasia & R facial droop  Initial NIH 9  S/p TNK      Imaging   CT head wo; s/p 24 hr TNK: Known small cortical infarct in left frontal lobe was better evaluated on earlier same day MRI brain without contrast. No acute intracranial hemorrhage.    MRI brain wo: Single small acute cortical infarction involving the left frontal lobe premotor cortex region.     Plan:  Stroke protocol   Neuro checks q2 hr am; q4 hr pm  Initiate anti-platelet therapy   Reimage if patient has decline in neurologic exam or < GCS by 2 points or more  Neurology & PMR consult   Analgesia: Multimodal. APAP 650 mg q6hr prn    Diagnosis: At risk for ICU delirium  Plan  CAM-ICU  Regulate sleep wake cycle   Redirection prn     CV:  Diagnoses: HTN, HLD, possible PAF    Lipid panel -   Cholesterol - 220  Triglyceride - 228  HDL - 36   LDL - 138    Imagin/03 Echo: mild concentric hypertrophy, LVEF 70%, hyperdynamic systolic function, no PFO, ascending aorta mildly dilated    Plan:   - 180, MAP > 65  Labetalol 10 mg q4hr prn   Atorvastatin 40 mg po qd  Consider restarting AC   Evaluation for PAF   Holding home medications: bisoprolol- HCTZ 20-12.5, ASA 81  Continuous cardiopulmonary monitoring.    Pulm:  Diagnoses: Severe HIEU    Plan:  CPAP at night  Continuous  pulse oximetry. Maintain O2 sat >92%.     GI:  Diagnoses: Hx of reflux esophagitis     Plan:  GI prophylaxis: Protonix 40 mg qd     :  Diagnoses: NUZHAT - resolved   Plan:  Monitor I/Os, Cr, BUN     F/E/N:  Plan:   F: Fluid resuscitation prn.  E: Monitor and replete electrolytes  N: Carb diet     Heme/Onc:  Diagnoses: s/p TNK 2/2 acute stroke   Plan:  Continue to monitor CBC  Monitor for evidence of acute bleeds  VTE prophylaxis: SCDs, pharm ppx held s/p TNK    Endo:  Diagnoses: Prediabetes, cold intolerance   6/03: TSH: 2.141  6/03: Hgb A1c: 6.0     Plan:   Monitor blood glucose; low threshold for initiation of SSI     ID:  Diagnoses: No active issues     Plan:  Monitor fever curve and WBC.    MSK/Skin:  Diagnoses: Ambulatory dysfunction  Plan:  PT/OT when appropriate. Encourage OOB and ambulation when appropriate. Local wound care prn.    LDAs:  Lines -  3 PIVs    Disposition: Critical care    ICU Core Measures     A: Assess, Prevent, and Manage Pain Has pain been assessed? Yes  Need for changes to pain regimen? No   B: Both SAT/SAT  N/A   C: Choice of Sedation RASS Goal: N/A patient not on sedation  Need for changes to sedation or analgesia regimen? No   D: Delirium CAM-ICU: Negative   E: Early Mobility  Plan for early mobility? Yes   F: Family Engagement Plan for family engagement today? Yes         Prophylaxis:  VTE Contraindicated secondary to: Stroke   Stress Ulcer  covered bypantoprazole (PROTONIX) 40 mg tablet [[id number]] (Long-Term Med), pantoprazole (PROTONIX) EC tablet 40 mg [[id number]]         24 Hour Events :   Anxious overnight about his blood pressure, otherwise no acute events.       Subjective   Review of Systems: Review of Systems   Constitutional:  Negative for chills and fever.   HENT:  Negative for ear pain and sore throat.    Eyes:  Negative for pain and visual disturbance.   Respiratory:  Negative for cough and shortness of breath.    Cardiovascular:  Negative for chest pain and palpitations.    Gastrointestinal:  Negative for abdominal pain and vomiting.   Genitourinary:  Negative for dysuria and hematuria.   Musculoskeletal:  Negative for arthralgias and back pain.   Skin:  Negative for color change and rash.   Neurological:  Positive for headaches (3/10). Negative for seizures, syncope and numbness.   All other systems reviewed and are negative.      Objective :                   Vitals I/O      Most Recent Min/Max in 24hrs   Temp 99.4 °F (37.4 °C) Temp  Min: 97.7 °F (36.5 °C)  Max: 99.4 °F (37.4 °C)   Pulse 56 Pulse  Min: 48  Max: 74   Resp 21 Resp  Min: 10  Max: 25   /75 BP  Min: 110/69  Max: 167/95   O2 Sat 95 % SpO2  Min: 92 %  Max: 97 %      Intake/Output Summary (Last 24 hours) at 6/4/2025 0628  Last data filed at 6/4/2025 0600  Gross per 24 hour   Intake 680 ml   Output 2550 ml   Net -1870 ml       Diet Luis/CHO Controlled; Consistent Carbohydrate Diet Level 3 (6 carb servings/90 grams CHO/meal); Lo Cholesterol    Invasive Monitoring           Physical Exam   Physical Exam  Eyes:      Pupils: Pupils are equal, round, and reactive to light.   Skin:     General: Skin is warm.   HENT:      Head: Normocephalic.   Cardiovascular:      Rate and Rhythm: Normal rate.   Musculoskeletal:         General: Normal range of motion.   Abdominal:      Palpations: Abdomen is soft.   Constitutional:       Appearance: He is well-developed and well-nourished.   Pulmonary:      Effort: Pulmonary effort is normal.   Neurological:      General: No focal deficit present.      Mental Status: He is alert and oriented to person, place and time. Mental status is at baseline.      Sensory: Sensation is intact.      Motor: gross motor function is at baseline for patient. Strength full and intact in all extremities.          Diagnostic Studies        Lab Results: I have reviewed the following results:     Medications:  Scheduled PRN   atorvastatin, 40 mg, QPM  [Held by provider] bisoprolol, 20 mg, Daily   And  [Held by  provider] hydroCHLOROthiazide, 12.5 mg, Daily  pantoprazole, 40 mg, Early Morning      acetaminophen, 650 mg, Q6H PRN  labetalol, 10 mg, Q4H PRN  ondansetron, 4 mg, Q6H PRN       Continuous          Labs:   CBC    Recent Labs     06/03/25  0511 06/04/25  0555   WBC 11.10* 8.46   HGB 16.3 17.0   HCT 48.9 49.7*    189     BMP    Recent Labs     06/02/25 1914 06/03/25  0511   SODIUM 139 139   K 3.1* 3.7    103   CO2 25 27   AGAP 13 9   BUN 25 20   CREATININE 1.48* 0.97   CALCIUM 9.8 8.7       Coags    Recent Labs     06/02/25 1914   INR 0.95   PTT 25        Additional Electrolytes  Recent Labs     06/03/25  0511 06/04/25  0555   MG 1.7*  --    PHOS 3.5  --    CAIONIZED  --  1.17          Blood Gas    No recent results  No recent results LFTs  Recent Labs     06/03/25  0511   ALT 32   AST 20   ALKPHOS 52   ALB 3.9   TBILI 1.03*       Infectious  No recent results  Glucose  Recent Labs     06/02/25 1914 06/03/25  0511   GLUC 140 104

## 2025-06-04 NOTE — TELEPHONE ENCOUNTER
STILL ADMITTED:6/2/2025 - present (2 days)  [facility]      Jimmy Hines MD  P Neurology Practice Hospital Discharge  Otis Robledo will need follow-up in in 4 weeks with neurovascular team for Embolic stroke secondary to known diagnosis of atrial fibrillation in 60 minute appointment. They will not require outpatient neurological testing

## 2025-06-04 NOTE — PROGRESS NOTES
I have personally seen and examined patient and reviewed all data with JEANNETTE. Agree with note, assessment and plan. Critical care time 0 minutes. Please refer to attending comments below. Critical care time does not include procedures, family meeting or teaching.     Patient was admitted for acute stroke status post TNK.  Symptoms completely resolved.  Workup identified patient had laboratory abnormalities with new diagnosis of prediabetes as well as hyperlipidemia.  No acute events overnight.    Visit Vitals  /90   Pulse 56   Temp 97.7 °F (36.5 °C) (Oral)   Resp 14   Ht 6' (1.829 m)   Wt 102 kg (225 lb)   SpO2 96%   BMI 30.52 kg/m²   Smoking Status Never   BSA 2.24 m²     GEN: NAD, awake and alert  HEENT: EOMI, PERRLA, MMM  CV: RRR on telemetry  Resp: Up   GI: soft,NT/ND  : No urinary catheter  Neuro: non focal motor exam, CN symmetric, normal speech  Skin: warm, dry    All laboratory data and imaging reviewed    Acute stroke with aphasia and right facial droop initial NIH 9 status post TNK 6/2/2025  PAF- 2020 xeralto discontinued 2021 by patient  HTN  HLD/hypertriglyceridemia  Prediabetes Hba1c 6.0  Cephalgia  NUZHAT- resolved  Hypomagnesemia- replete  Hypokalemia- replete  Severe HIEU- CPAP 11 cm H2O    Goal systolic blood pressure:  120-180    Respiratory support:  CPAP at bedtime    I/O -1.8 L  UO 2.5 L  BM 0    Patient wore CPAP overnight and did not have any new events or complaints.  Vital signs reviewed with heart rate ranging from 48-56 this morning.  Blood pressure appears to be well-controlled.  CT head obtained overnight as patient was 24 hours status post thrombolysis.  Small cortical infarct left frontal lobe without interval hemorrhage.  Laboratory data reviewed BMP is currently pending.  CBC with a hemoglobin of 17.  TSH resulted with in normal limits at 2.14.  Blood glucoses ranged from 104-140.    Neurologic checks Q 4 hour.  Repeat imaging at 24 hours status post thrombolysis completed and as  stated above..  MRI completed.  Plan to initiate anti-platelet therapy with aspirin and Plavix as patient had been on aspirin as an outpatient.  Will need to confirm if patient had diagnosis of PAF as he would benefit from anticoagulation if this is the case.  Patient reportedly had a Zio patch in the past.  May need loop recorder if no confirm history of PAF.  If no confirmed history of PAF check thrombosis panel and may require TREY and hypercoagulable workup..     Statin medication ordered.  Continue with stroke protocol with PT/OT and speech evaluation. HbA1C, lipid panel and echo ordered. Neurology consult.     Plan to to reimage if patient has decline in neurologic exam or < GCS by 2 points or more.     Update: 8:36 AM    According to cardiology note documentation on 12/8/2021 patient had an episode of chest pain the year prior with no evidence of ischemia on stress testing.  He was reported to have atrial fibrillation at that time.  He was also placed on Xarelto at that time.  Additional documentation on 12/3/2020 states that patient presented that year with atypical chest pain he was seen by cardiology and in September was scheduled to have an echocardiogram stress test.  He was noted to be in atrial fibrillation at that time.  Echo images were suboptimal as patient was in atrial fibrillation.  On 9/23/2020 patient presented for echo stress test and resting EKG was noted to be in atrial fibrillation.  Cardiology was aware and patient underwent stress echo.    Update: 1035am     Allow BP to normalize    Lab holiday tomorrow    Restart home bisoprolol    Discuss start AC with patient     Start DVT ppx    Transfer today

## 2025-06-04 NOTE — PLAN OF CARE
Problem: PAIN - ADULT  Goal: Verbalizes/displays adequate comfort level or baseline comfort level  Description: Interventions:  - Encourage patient to monitor pain and request assistance  - Assess pain using appropriate pain scale  - Administer analgesics as ordered based on type and severity of pain and evaluate response  - Implement non-pharmacological measures as appropriate and evaluate response  - Consider cultural and social influences on pain and pain management  - Notify physician/advanced practitioner if interventions unsuccessful or patient reports new pain  - Educate patient/family on pain management process including their role and importance of  reporting pain   - Provide non-pharmacologic/complimentary pain relief interventions  6/3/2025 2123 by Lois Wood RN  Outcome: Progressing  6/3/2025 2123 by Lois Wood RN  Outcome: Not Progressing     Problem: INFECTION - ADULT  Goal: Absence or prevention of progression during hospitalization  Description: INTERVENTIONS:  - Assess and monitor for signs and symptoms of infection  - Monitor lab/diagnostic results  - Monitor all insertion sites, i.e. indwelling lines, tubes, and drains  - Monitor endotracheal if appropriate and nasal secretions for changes in amount and color  - [place] appropriate cooling/warming therapies per order  - Administer medications as ordered  - Instruct and encourage patient and family to use good hand hygiene technique  - Identify and instruct in appropriate isolation precautions for identified infection/condition  6/3/2025 2123 by Lois Wood RN  Outcome: Progressing  6/3/2025 2123 by Lois Wood RN  Outcome: Not Progressing  Goal: Absence of fever/infection during neutropenic period  Description: INTERVENTIONS:  - Monitor WBC  - Perform strict hand hygiene  - Limit to healthy visitors only  - No plants, dried, fresh or silk flowers with turk in patient room  6/3/2025 2123 by Lois Wood  RN  Outcome: Progressing  6/3/2025 2123 by Lois Wood RN  Outcome: Not Progressing     Problem: SAFETY ADULT  Goal: Patient will remain free of falls  Description: INTERVENTIONS:  - Educate patient/family on patient safety including physical limitations  - Instruct patient to call for assistance with activity   - Consider consulting OT/PT to assist with strengthening/mobility based on AM PAC & JH-HLM score  - Consult OT/PT to assist with strengthening/mobility   - Keep Call bell within reach  - Keep bed low and locked with side rails adjusted as appropriate  - Keep care items and personal belongings within reach  - Initiate and maintain comfort rounds  - Make Fall Risk Sign visible to staff  - Offer Toileting every Hours, in advance of need  - Initiate/Maintain alarm  - Obtain necessary fall risk management equipment:   - Apply yellow socks and bracelet for high fall risk patients  - Consider moving patient to room near nurses station  6/3/2025 2123 by Lois Wood RN  Outcome: Progressing  6/3/2025 2123 by Lois Wood RN  Outcome: Not Progressing  Goal: Maintain or return to baseline ADL function  Description: INTERVENTIONS:  -  Assess patient's ability to carry out ADLs; assess patient's baseline for ADL function and identify physical deficits which impact ability to perform ADLs (bathing, care of mouth/teeth, toileting, grooming, dressing, etc.)  - Assess/evaluate cause of self-care deficits   - Assess range of motion  - Assess patient's mobility; develop plan if impaired  - Assess patient's need for assistive devices and provide as appropriate  - Encourage maximum independence but intervene and supervise when necessary  - Involve family in performance of ADLs  - Assess for home care needs following discharge   - Consider OT consult to assist with ADL evaluation and planning for discharge  - Provide patient education as appropriate  - Monitor functional capacity and physical performance,  use of AM PAC & -HLM   - Monitor gait, balance and fatigue with ambulation    6/3/2025 2123 by Lois Wood RN  Outcome: Progressing  6/3/2025 2123 by Lois Wood RN  Outcome: Not Progressing  Goal: Maintains/Returns to pre admission functional level  Description: INTERVENTIONS:  - Perform AM-PAC 6 Click Basic Mobility/ Daily Activity assessment daily.  - Set and communicate daily mobility goal to care team and patient/family/caregiver.   - Collaborate with rehabilitation services on mobility goals if consulted  - Perform Range of Motion  times a day.  - Reposition patient every  hours.  - Dangle patient  times a day  - Stand patient  times a day  - Ambulate patient  times a day  - Out of bed to chair  times a day   - Out of bed for meals times a day  - Out of bed for toileting  - Record patient progress and toleration of activity level   6/3/2025 2123 by Lois Wood RN  Outcome: Progressing  6/3/2025 2123 by Lois Wood RN  Outcome: Not Progressing     Problem: DISCHARGE PLANNING  Goal: Discharge to home or other facility with appropriate resources  Description: INTERVENTIONS:  - Identify barriers to discharge w/patient and caregiver  - Arrange for needed discharge resources and transportation as appropriate  - Identify discharge learning needs (meds, wound care, etc.)  - Arrange for interpretive services to assist at discharge as needed  - Refer to Case Management Department for coordinating discharge planning if the patient needs post-hospital services based on physician/advanced practitioner order or complex needs related to functional status, cognitive ability, or social support system  6/3/2025 2123 by Lois Wood RN  Outcome: Progressing  6/3/2025 2123 by Lois Wood RN  Outcome: Not Progressing     Problem: Knowledge Deficit  Goal: Patient/family/caregiver demonstrates understanding of disease process, treatment plan, medications, and discharge  instructions  Description: Complete learning assessment and assess knowledge base.  Interventions:  - Provide teaching at level of understanding  - Provide teaching via preferred learning methods  6/3/2025 2123 by Lois Wood RN  Outcome: Progressing  6/3/2025 2123 by Lois Wood RN  Outcome: Not Progressing     Problem: Neurological Deficit  Goal: Neurological status is stable or improving  Description: Interventions:  - Monitor and assess patient's level of consciousness, motor function, sensory function, and level of assistance needed for ADLs.   - Monitor and report changes from baseline. Collaborate with interdisciplinary team to initiate plan and implement interventions as ordered.   - Provide and maintain a safe environment.  - Consider seizure precautions.  - Consider fall precautions.  - Consider aspiration precautions.  - Consider bleeding precautions.  6/3/2025 2123 by Lois Wood RN  Outcome: Progressing  6/3/2025 2123 by Lois Wood RN  Outcome: Not Progressing     Problem: Activity Intolerance/Impaired Mobility  Goal: Mobility/activity is maintained at optimum level for patient  Description: Interventions:  - Assess and monitor patient  barriers to mobility and need for assistive/adaptive devices.  - Assess patient's emotional response to limitations.  - Collaborate with interdisciplinary team and initiate plans and interventions as ordered.  - Encourage independent activity per ability.  - Maintain proper body alignment.  - Perform active/passive rom as tolerated/ordered.  - Plan activities to conserve energy.  - Turn patient as appropriate  6/3/2025 2123 by Lois oWod RN  Outcome: Progressing  6/3/2025 2123 by Lois Wood RN  Outcome: Not Progressing     Problem: Communication Impairment  Goal: Ability to express needs and understand communication  Description: Assess patient's communication skills and ability to understand information.  Patient will  demonstrate use of effective communication techniques, alternative methods of communication and understanding even if not able to speak.     - Encourage communication and provide alternate methods of communication as needed.  - Collaborate with case management/ for discharge needs.  - Include patient/family/caregiver in decisions related to communication.  6/3/2025 2123 by Lois Wood RN  Outcome: Progressing  6/3/2025 2123 by Lois Wood RN  Outcome: Not Progressing     Problem: Potential for Aspiration  Goal: Non-ventilated patient's risk of aspiration is minimized  Description: Assess and monitor vital signs, respiratory status, and labs (WBC).  Monitor for signs of aspiration (tachypnea, cough, rales, wheezing, cyanosis, fever).    - Assess and monitor patient's ability to swallow.  - Place patient up in chair to eat if possible.  - HOB up at 90 degrees to eat if unable to get patient up into chair.  - Supervise patient during oral intake.   - Instruct patient/ family to take small bites.  - Instruct patient/ family to take small single sips when taking liquids.  - Follow patient-specific strategies generated by speech pathologist.  6/3/2025 2123 by Lois Wood RN  Outcome: Progressing  6/3/2025 2123 by Lois Wood RN  Outcome: Not Progressing  Goal: Ventilated patient's risk of aspiration is minimized  Description: Assess and monitor vital signs, respiratory status, airway cuff pressure, and labs (WBC).  Monitor for signs of aspiration (tachypnea, cough, rales, wheezing, cyanosis, fever).    - Elevate head of bed 30 degrees if patient has tube feeding.  - Monitor tube feeding.  6/3/2025 2123 by Lois Wood RN  Outcome: Progressing  6/3/2025 2123 by Lois Wood RN  Outcome: Not Progressing     Problem: Nutrition  Goal: Nutrition/Hydration status is improving  Description: Monitor and assess patient's nutrition/hydration status for malnutrition (ex-  brittle hair, bruises, dry skin, pale skin and conjunctiva, muscle wasting, smooth red tongue, and disorientation). Collaborate with interdisciplinary team and initiate plan and interventions as ordered.  Monitor patient's weight and dietary intake as ordered or per policy. Utilize nutrition screening tool and intervene per policy. Determine patient's food preferences and provide high-protein, high-caloric foods as appropriate.     - Assist patient with eating.  - Allow adequate time for meals.  - Encourage patient to take dietary supplement as ordered.  - Collaborate with clinical nutritionist.  - Include patient/family/caregiver in decisions related to nutrition.  6/3/2025 2123 by Lois Wood RN  Outcome: Progressing  6/3/2025 2123 by Lois Wood RN  Outcome: Not Progressing

## 2025-06-04 NOTE — PLAN OF CARE
Problem: OCCUPATIONAL THERAPY ADULT  Goal: Performs self-care activities at highest level of function for planned discharge setting.  See evaluation for individualized goals.  Description: Treatment Interventions: ADL retraining, Functional transfer training, Endurance training, Cognitive reorientation, Patient/family training, Equipment evaluation/education, Compensatory technique education, Continued evaluation, Energy conservation, Activityengagement          See flowsheet documentation for full assessment, interventions and recommendations.   Outcome: Completed  Note: Limitation: Decreased ADL status, Decreased Safe judgement during ADL, Decreased cognition, Decreased endurance, Decreased self-care trans, Decreased high-level ADLs  Prognosis: Fair  Assessment: Pt seen for OT treatment session this pm w/ focus on LB dressing, transfers, standing tolerance, functional mobility, activity tolerance, and formal cognitive assessment. Upon OT arrival, pt found supine in bed and agreeable to participate in therapy. Pt mod I for LB dressing, transfers, and functional mobility. Pt demonstrates increased safety awareness and decreased impulsivity w/ pt's wife noting he is back to his baseline. Pt cooperative throughout MoCA administration, scoring a 28/30, indicating pt has no cognitive impairment. Pt returned back to bed w/ all needs within reach at EOS. Pt w/ no questions or concerns about d/c. OT to recommend d/c home w/ increased family support. OT to d/c 6/4/25.     Rehab Resource Intensity Level, OT: No post-acute rehabilitation needs     Alysa Dyson, OTS

## 2025-06-04 NOTE — PROGRESS NOTES
"Progress Note - Critical Care/ICU   Name: Otis Robledo 59 y.o. male I MRN: [id number]  Unit/Bed#: ICU 13 I Date of Admission: 6/2/2025   Date of Service: 6/4/2025 I Hospital Day: 2       Critical Care Interval Transfer Note:    Brief Hospital Summary: Patient is a 60 yo male w/ hx of HTN, HLD, PAF, and HIEU who presented on June 2nd with aphasia, R sided facial drop, and bilateral sensory deficits. Stroke alert was called and he received TNK. After the TNK, all his symptoms resolved. He is neuro intact with no residual deficits. [place] noting, patient reported he did not have A fib and took himself off Xarelto in 2020 d/t \"cold intolerance\" as he had to wear sweaters in the summer. Upon further investigation we found a stress test from 2020 that noted Afib both prior to study and during. Plan from our standpoint is to restart anticoagulation tomorrow and get repeat head CT afterwards.    Barriers to discharge:   Restart anticoagulation   Repeat head CT after starting AC      Consults: IP CONSULT TO NEUROLOGY  IP CONSULT TO PHYSICAL MEDICINE REHAB  IP CONSULT TO CASE MANAGEMENT  IP CONSULT TO NUTRITION SERVICES    Recommended to review admission imaging for incidental findings and document in discharge navigator: Chart reviewed, no known incidental findings noted at this time.      Discharge Plan: Anticipate discharge tomorrow to home.     Patient seen and evaluated by Critical Care today and deemed to be appropriate for transfer to Med Surg with Telemetry. Spoke to Vivek Samuel MD  from [facility] to accept transfer. Critical care can be contacted via SecureChat with any questions or concerns. Please use the Critical Care AP Role in Secure Chat for any provider inquires until the patient is transferred out of the ICU or until tomorrow at 0600.  "

## 2025-06-05 VITALS
HEART RATE: 60 BPM | RESPIRATION RATE: 18 BRPM | WEIGHT: 225 LBS | BODY MASS INDEX: 30.48 KG/M2 | HEIGHT: 72 IN | OXYGEN SATURATION: 97 % | SYSTOLIC BLOOD PRESSURE: 151 MMHG | DIASTOLIC BLOOD PRESSURE: 99 MMHG | TEMPERATURE: 97.2 F

## 2025-06-05 PROBLEM — N17.9 AKI (ACUTE KIDNEY INJURY) (HCC): Status: RESOLVED | Noted: 2025-06-03 | Resolved: 2025-06-05

## 2025-06-05 LAB
ANION GAP SERPL CALCULATED.3IONS-SCNC: 6 MMOL/L (ref 4–13)
BUN SERPL-MCNC: 18 MG/DL (ref 5–25)
CA-I BLD-SCNC: 1.17 MMOL/L (ref 1.12–1.32)
CALCIUM SERPL-MCNC: 9.2 MG/DL (ref 8.4–10.2)
CHLORIDE SERPL-SCNC: 106 MMOL/L (ref 96–108)
CO2 SERPL-SCNC: 26 MMOL/L (ref 21–32)
CREAT SERPL-MCNC: 0.93 MG/DL (ref 0.6–1.3)
ERYTHROCYTE [DISTWIDTH] IN BLOOD BY AUTOMATED COUNT: 13.2 % (ref 11.6–15.1)
GFR SERPL CREATININE-BSD FRML MDRD: 90 ML/MIN/1.73SQ M
GLUCOSE SERPL-MCNC: 101 MG/DL (ref 65–140)
HCT VFR BLD AUTO: 51.3 % (ref 36.5–49.3)
HGB BLD-MCNC: 17.1 G/DL (ref 12–17)
MAGNESIUM SERPL-MCNC: 2 MG/DL (ref 1.9–2.7)
MCH RBC QN AUTO: 29.7 PG (ref 26.8–34.3)
MCHC RBC AUTO-ENTMCNC: 33.3 G/DL (ref 31.4–37.4)
MCV RBC AUTO: 89 FL (ref 82–98)
PHOSPHATE SERPL-MCNC: 4 MG/DL (ref 2.7–4.5)
PLATELET # BLD AUTO: 202 THOUSANDS/UL (ref 149–390)
PMV BLD AUTO: 10 FL (ref 8.9–12.7)
POTASSIUM SERPL-SCNC: 4.2 MMOL/L (ref 3.5–5.3)
RBC # BLD AUTO: 5.75 MILLION/UL (ref 3.88–5.62)
SODIUM SERPL-SCNC: 138 MMOL/L (ref 135–147)
WBC # BLD AUTO: 8.56 THOUSAND/UL (ref 4.31–10.16)

## 2025-06-05 PROCEDURE — 99239 HOSP IP/OBS DSCHRG MGMT >30: CPT

## 2025-06-05 PROCEDURE — 83735 ASSAY OF MAGNESIUM: CPT | Performed by: PHYSICIAN ASSISTANT

## 2025-06-05 PROCEDURE — 84100 ASSAY OF PHOSPHORUS: CPT | Performed by: PHYSICIAN ASSISTANT

## 2025-06-05 PROCEDURE — 80048 BASIC METABOLIC PNL TOTAL CA: CPT | Performed by: PHYSICIAN ASSISTANT

## 2025-06-05 PROCEDURE — 82330 ASSAY OF CALCIUM: CPT | Performed by: PHYSICIAN ASSISTANT

## 2025-06-05 PROCEDURE — 85027 COMPLETE CBC AUTOMATED: CPT | Performed by: PHYSICIAN ASSISTANT

## 2025-06-05 RX ORDER — HYDROCHLOROTHIAZIDE 50 MG/1
50 TABLET ORAL DAILY
Qty: 90 TABLET | Refills: 0 | Status: CANCELLED | OUTPATIENT
Start: 2025-06-05 | End: 2025-09-03

## 2025-06-05 RX ORDER — HYDROCHLOROTHIAZIDE 25 MG/1
25 TABLET ORAL DAILY
Qty: 90 TABLET | Refills: 0 | Status: SHIPPED | OUTPATIENT
Start: 2025-06-05 | End: 2025-09-03

## 2025-06-05 RX ORDER — BISOPROLOL FUMARATE 10 MG/1
20 TABLET, FILM COATED ORAL DAILY
Qty: 180 TABLET | Refills: 0 | Status: SHIPPED | OUTPATIENT
Start: 2025-06-06 | End: 2025-09-04

## 2025-06-05 RX ORDER — DOCUSATE SODIUM 100 MG/1
100 CAPSULE, LIQUID FILLED ORAL 2 TIMES DAILY
Status: DISCONTINUED | OUTPATIENT
Start: 2025-06-05 | End: 2025-06-05 | Stop reason: HOSPADM

## 2025-06-05 RX ORDER — ATORVASTATIN CALCIUM 40 MG/1
40 TABLET, FILM COATED ORAL EVERY EVENING
Qty: 90 TABLET | Refills: 0 | Status: SHIPPED | OUTPATIENT
Start: 2025-06-05 | End: 2025-06-13 | Stop reason: SINTOL

## 2025-06-05 RX ADMIN — LABETALOL HYDROCHLORIDE 10 MG: 5 INJECTION, SOLUTION INTRAVENOUS at 06:40

## 2025-06-05 RX ADMIN — PANTOPRAZOLE SODIUM 40 MG: 40 TABLET, DELAYED RELEASE ORAL at 05:57

## 2025-06-05 RX ADMIN — ENOXAPARIN SODIUM 40 MG: 40 INJECTION SUBCUTANEOUS at 09:24

## 2025-06-05 RX ADMIN — HYDROCHLOROTHIAZIDE 12.5 MG: 12.5 TABLET ORAL at 09:24

## 2025-06-05 RX ADMIN — ACETAMINOPHEN 650 MG: 325 TABLET ORAL at 05:57

## 2025-06-05 RX ADMIN — BISOPROLOL FUMARATE 20 MG: 5 TABLET ORAL at 10:26

## 2025-06-05 RX ADMIN — APIXABAN 5 MG: 5 TABLET, FILM COATED ORAL at 11:47

## 2025-06-05 NOTE — ASSESSMENT & PLAN NOTE
Lab Results   Component Value Date    CREATININE 0.93 06/05/2025    CREATININE 0.93 06/04/2025    CREATININE 0.97 06/03/2025       Lab Results   Component Value Date    EGFR 90 06/05/2025    EGFR 90 06/04/2025    EGFR 85 06/03/2025       NUZHAT now resolved

## 2025-06-05 NOTE — ASSESSMENT & PLAN NOTE
Patient had paroxysmal atrial fibrillation in the past previously on Xarelto and had discontinued on his own.  Restarted on Eliquis during this admission  Advised to keep compliant with it to decrease stroke   SUBJECTIVE:                                                      Jj Burt is a 4 month old male, here for a routine health maintenance visit.    Patient was roomed by: Amanuel Otoole    Well Child     Social History  Patient accompanied by:  Mother and sisters  Questions or concerns?: No    Forms to complete? No  Child lives with::  Mother, father, sister and brother  Who takes care of your child?:  Home with family member, father, maternal grandmother and mother  Languages spoken in the home:  English  Recent family changes/ special stressors?:  None noted    Safety / Health Risk  Is your child around anyone who smokes?  No    TB Exposure:     No TB exposure    Car seat < 6 years old, in  back seat, rear-facing, 5-point restraint? Yes    Home Safety Survey:      Firearms in the home?: No      Hearing / Vision  Hearing or vision concerns?  No concerns, hearing and vision subjectively normal    Daily Activities    Water source:  City water  Nutrition:  Breastmilk, pumped breastmilk by bottle and formula  Breastfeeding concerns?  None, breastfeeding going well; no concerns  Formula:  Alimentum  Vitamins & Supplements:  Yes      Vitamin type: D only    Elimination       Urinary frequency:more than 6 times per 24 hours     Stool frequency: 1-3 times per 24 hours     Stool consistency: soft     Elimination problems:  None    Sleep      Sleep arrangement:bassinet    Sleep position:  On back and on side    Sleep pattern: 1-2 wake periods daily and wakes at night for feedings        DEVELOPMENT  ASQ 4 M Communication Gross Motor Fine Motor Problem Solving Personal-social   Score 60 45 60 55 50   Cutoff 34.60 38.41 29.62 34.98 33.16   Result Passed Passed Passed Passed Passed          PROBLEM LIST  Patient Active Problem List   Diagnosis     Single liveborn infant, delivered by      MEDICATIONS  No current outpatient medications on file.      ALLERGY  No Known Allergies    IMMUNIZATIONS  Immunization History  "  Administered Date(s) Administered     DTAP-IPV/HIB ([place]) 2018     Hep B, Peds or Adolescent 2018, 2018     Pneumo Conj 13-V (2010&after) 2018     Rotavirus, monovalent, 2-dose 2018       HEALTH HISTORY SINCE LAST VISIT  No surgery, major illness or injury since last physical exam    ROS  Constitutional, eye, ENT, skin, respiratory, cardiac, and GI are normal except as otherwise noted.    OBJECTIVE:   EXAM  Pulse 148   Temp 97.9  F (36.6  C)   Resp 20   Ht 2' 1\" (0.635 m)   Wt 15 lb 4 oz (6.917 kg)   HC 16.5\" (41.9 cm)   SpO2 100%   BMI 17.16 kg/m    36 %ile based on WHO (Boys, 0-2 years) Length-for-age data based on Length recorded on 1/23/2019.  41 %ile based on WHO (Boys, 0-2 years) weight-for-age data based on Weight recorded on 1/23/2019.  54 %ile based on WHO (Boys, 0-2 years) head circumference-for-age based on Head Circumference recorded on 1/23/2019.  GENERAL: Active, alert, in no acute distress.  SKIN: some mild dry skin patches on forehead. Light brown patch by right nipple. No significant rash, abnormal pigmentation or lesions  HEAD: Normocephalic. Normal fontanels and sutures.  EYES: Conjunctivae and cornea normal. Red reflexes present bilaterally.  EARS: Normal canals. Tympanic membranes are normal; gray and translucent.  NOSE: Normal without discharge.  MOUTH/THROAT: Clear. No oral lesions.  NECK: Supple, no masses.  LYMPH NODES: No adenopathy  LUNGS: Clear. No rales, rhonchi, wheezing or retractions  HEART: Regular rhythm. Normal S1/S2. No murmurs. Normal femoral pulses.  ABDOMEN: Soft, non-tender, not distended, no masses or hepatosplenomegaly. Normal umbilicus and bowel sounds.   GENITALIA: Normal male external genitalia. Stanley stage I,  Testes descended bilateraly, no hernia or hydrocele.    EXTREMITIES: Hips normal with negative Ortolani and Galvin. Symmetric creases and  no deformities  NEUROLOGIC: Normal tone throughout. Normal reflexes for " age    ASSESSMENT/PLAN:       ICD-10-CM    1. Encounter for routine child health examination w/o abnormal findings Z00.129        Anticipatory Guidance  The following topics were discussed:  SOCIAL / FAMILY    crying/ fussiness    on stomach to play    sibling rivalry  NUTRITION:    solid food introduction at 4-6 months old    always hold to feed/ never prop bottle    vit D if breastfeeding  HEALTH/ SAFETY:    teething    spitting up    sleep patterns    falls/ rolling    Preventive Care Plan  Immunizations     See orders in EpicCare.  I reviewed the signs and symptoms of adverse effects and when to seek medical care if they should arise.  Referrals/Ongoing Specialty care: No   See other orders in [facility]    Resources:  [place] Child and Teen Checkups (C&TC) Schedule of Age-Related Screening Standards    FOLLOW-UP:    6 month Preventive Care visit    Veda Carias MD  [facility]

## 2025-06-05 NOTE — ASSESSMENT & PLAN NOTE
Patient initially presented on 6/2, where patient was noted to have left hemispheric stroke status post TNK and kept in the ICU.  Came in with a right-sided facial droop.  Blood pressure was controlled  Neurology was following  Patient was discharged from the ICU on 6/5/2025 had resolution of all neurologic deficits  Of note patient was on Xarelto previously for A-fib however he discontinued on his home because he became very fatigued from the medication.  Plan:  Repeat CT scan after TNK was normal  Patient was started on Eliquis today on 6/5/2025 unfortunately patient gets side effect from this and gets excessively sleepy because of this  I did read encouraged the patient to continue this to prevent stroke  PT OT did evaluate no postacute rehab needs  I will discharge today  Followup with neuro as outpatient

## 2025-06-05 NOTE — PLAN OF CARE
Problem: PAIN - ADULT  Goal: Verbalizes/displays adequate comfort level or baseline comfort level  Description: Interventions:  - Encourage patient to monitor pain and request assistance  - Assess pain using appropriate pain scale  - Administer analgesics as ordered based on type and severity of pain and evaluate response  - Implement non-pharmacological measures as appropriate and evaluate response  - Consider cultural and social influences on pain and pain management  - Notify physician/advanced practitioner if interventions unsuccessful or patient reports new pain  - Educate patient/family on pain management process including their role and importance of  reporting pain   - Provide non-pharmacologic/complimentary pain relief interventions  Outcome: Progressing     Problem: INFECTION - ADULT  Goal: Absence or prevention of progression during hospitalization  Description: INTERVENTIONS:  - Assess and monitor for signs and symptoms of infection  - Monitor lab/diagnostic results  - Monitor all insertion sites, i.e. indwelling lines, tubes, and drains  - Monitor endotracheal if appropriate and nasal secretions for changes in amount and color  - [place]al appropriate cooling/warming therapies per order  - Administer medications as ordered  - Instruct and encourage patient and family to use good hand hygiene technique  - Identify and instruct in appropriate isolation precautions for identified infection/condition  Outcome: Progressing  Goal: Absence of fever/infection during neutropenic period  Description: INTERVENTIONS:  - Monitor WBC  - Perform strict hand hygiene  - Limit to healthy visitors only  - No plants, dried, fresh or silk flowers with turk in patient room  Outcome: Progressing     Problem: SAFETY ADULT  Goal: Patient will remain free of falls  Description: INTERVENTIONS:  - Educate patient/family on patient safety including physical limitations  - Instruct patient to call for assistance with activity   -  Consider consulting OT/PT to assist with strengthening/mobility based on AM PAC & JH-HLM score  - Consult OT/PT to assist with strengthening/mobility   - Keep Call bell within reach  - Keep bed low and locked with side rails adjusted as appropriate  - Keep care items and personal belongings within reach  - Initiate and maintain comfort rounds  - Make Fall Risk Sign visible to staff  - Offer Toileting every 2 Hours, in advance of need  - Initiate/Maintain bed/chair alarm  - Obtain necessary fall risk management equipment: nonskid footwear  - Apply yellow socks and bracelet for high fall risk patients  - Consider moving patient to room near nurses station  Outcome: Progressing  Goal: Maintain or return to baseline ADL function  Description: INTERVENTIONS:  -  Assess patient's ability to carry out ADLs; assess patient's baseline for ADL function and identify physical deficits which impact ability to perform ADLs (bathing, care of mouth/teeth, toileting, grooming, dressing, etc.)  - Assess/evaluate cause of self-care deficits   - Assess range of motion  - Assess patient's mobility; develop plan if impaired  - Assess patient's need for assistive devices and provide as appropriate  - Encourage maximum independence but intervene and supervise when necessary  - Involve family in performance of ADLs  - Assess for home care needs following discharge   - Consider OT consult to assist with ADL evaluation and planning for discharge  - Provide patient education as appropriate  - Monitor functional capacity and physical performance, use of AM PAC & JH-HLM   - Monitor gait, balance and fatigue with ambulation    Outcome: Progressing  Goal: Maintains/Returns to pre admission functional level  Description: INTERVENTIONS:  - Perform AM-PAC 6 Click Basic Mobility/ Daily Activity assessment daily.  - Set and communicate daily mobility goal to care team and patient/family/caregiver.   - Collaborate with rehabilitation services on  mobility goals if consulted  - Perform Range of Motion 3 times a day.  - Reposition patient every 3 hours.  - Dangle patient 3 times a day  - Stand patient 3 times a day  - Ambulate patient 3 times a day  - Out of bed to chair 3 times a day   - Out of bed for meals 3 times a day  - Out of bed for toileting  - Record patient progress and toleration of activity level   Outcome: Progressing     Problem: DISCHARGE PLANNING  Goal: Discharge to home or other facility with appropriate resources  Description: INTERVENTIONS:  - Identify barriers to discharge w/patient and caregiver  - Arrange for needed discharge resources and transportation as appropriate  - Identify discharge learning needs (meds, wound care, etc.)  - Arrange for interpretive services to assist at discharge as needed  - Refer to Case Management Department for coordinating discharge planning if the patient needs post-hospital services based on physician/advanced practitioner order or complex needs related to functional status, cognitive ability, or social support system  Outcome: Progressing     Problem: Knowledge Deficit  Goal: Patient/family/caregiver demonstrates understanding of disease process, treatment plan, medications, and discharge instructions  Description: Complete learning assessment and assess knowledge base.  Interventions:  - Provide teaching at level of understanding  - Provide teaching via preferred learning methods  Outcome: Progressing     Problem: Neurological Deficit  Goal: Neurological status is stable or improving  Description: Interventions:  - Monitor and assess patient's level of consciousness, motor function, sensory function, and level of assistance needed for ADLs.   - Monitor and report changes from baseline. Collaborate with interdisciplinary team to initiate plan and implement interventions as ordered.   - Provide and maintain a safe environment.  - Consider seizure precautions.  - Consider fall precautions.  - Consider  aspiration precautions.  - Consider bleeding precautions.  Outcome: Progressing     Problem: Activity Intolerance/Impaired Mobility  Goal: Mobility/activity is maintained at optimum level for patient  Description: Interventions:  - Assess and monitor patient  barriers to mobility and need for assistive/adaptive devices.  - Assess patient's emotional response to limitations.  - Collaborate with interdisciplinary team and initiate plans and interventions as ordered.  - Encourage independent activity per ability.  - Maintain proper body alignment.  - Perform active/passive rom as tolerated/ordered.  - Plan activities to conserve energy.  - Turn patient as appropriate  Outcome: Progressing     Problem: Communication Impairment  Goal: Ability to express needs and understand communication  Description: Assess patient's communication skills and ability to understand information.  Patient will demonstrate use of effective communication techniques, alternative methods of communication and understanding even if not able to speak.     - Encourage communication and provide alternate methods of communication as needed.  - Collaborate with case management/ for discharge needs.  - Include patient/family/caregiver in decisions related to communication.  Outcome: Progressing     Problem: Potential for Aspiration  Goal: Non-ventilated patient's risk of aspiration is minimized  Description: Assess and monitor vital signs, respiratory status, and labs (WBC).  Monitor for signs of aspiration (tachypnea, cough, rales, wheezing, cyanosis, fever).    - Assess and monitor patient's ability to swallow.  - Place patient up in chair to eat if possible.  - HOB up at 90 degrees to eat if unable to get patient up into chair.  - Supervise patient during oral intake.   - Instruct patient/ family to take small bites.  - Instruct patient/ family to take small single sips when taking liquids.  - Follow patient-specific strategies  generated by speech pathologist.  Outcome: Progressing  Goal: Ventilated patient's risk of aspiration is minimized  Description: Assess and monitor vital signs, respiratory status, airway cuff pressure, and labs (WBC).  Monitor for signs of aspiration (tachypnea, cough, rales, wheezing, cyanosis, fever).    - Elevate head of bed 30 degrees if patient has tube feeding.  - Monitor tube feeding.  Outcome: Progressing     Problem: Nutrition  Goal: Nutrition/Hydration status is improving  Description: Monitor and assess patient's nutrition/hydration status for malnutrition (ex- brittle hair, bruises, dry skin, pale skin and conjunctiva, muscle wasting, smooth red tongue, and disorientation). Collaborate with interdisciplinary team and initiate plan and interventions as ordered.  Monitor patient's weight and dietary intake as ordered or per policy. Utilize nutrition screening tool and intervene per policy. Determine patient's food preferences and provide high-protein, high-caloric foods as appropriate.     - Assist patient with eating.  - Allow adequate time for meals.  - Encourage patient to take dietary supplement as ordered.  - Collaborate with clinical nutritionist.  - Include patient/family/caregiver in decisions related to nutrition.  Outcome: Progressing

## 2025-06-05 NOTE — DISCHARGE SUMMARY
Discharge Summary - Hospitalist   Name: Otis Robledo 59 y.o. male I MRN: [id number]  [facility] Date of Admission: 6/2/2025   Date of Service: 6/5/2025 I Hospital Day: 3     Assessment & Plan  Stroke (HCC)  Patient initially presented on 6/2, where patient was noted to have left hemispheric stroke status post TNK and kept in the ICU.  Came in with a right-sided facial droop.  Blood pressure was controlled  Neurology was following  Patient was discharged from the ICU on 6/5/2025 had resolution of all neurologic deficits  Of note patient was on Xarelto previously for A-fib however he discontinued on his home because he became very fatigued from the medication.  Plan:  Repeat CT scan after TNK was normal  Patient was started on Eliquis today on 6/5/2025 unfortunately patient gets side effect from this and gets excessively sleepy because of this  I did read encouraged the patient to continue this to prevent stroke  PT OT did evaluate no postacute rehab needs  I will discharge today  Followup with neuro as outpatient    Obstructive sleep apnea  Continue CPAP at home  Follow-up with sleep medicine  Benign essential hypertension  Increased hydrochlorthiaszide to 25 mg once daily. Cw bisoprolol 20 mg daily  Dyslipidemia  Started on atorvastatis  Esophagitis, reflux  Continue pantoprazole  Paroxysmal atrial fibrillation (HCC)  Patient had paroxysmal atrial fibrillation in the past previously on Xarelto and had discontinued on his own.  Restarted on Eliquis during this admission  Advised to keep compliant with it to decrease stroke  NUZHAT (acute kidney injury) (HCC) (Resolved: 6/5/2025)  Lab Results   Component Value Date    CREATININE 0.93 06/05/2025    CREATININE 0.93 06/04/2025    CREATININE 0.97 06/03/2025       Lab Results   Component Value Date    EGFR 90 06/05/2025    EGFR 90 06/04/2025    EGFR 85 06/03/2025       NUZHAT now resolved       Medical Problems       Resolved Problems  Date Reviewed: 5/7/2025           Resolved    NUZHAT (acute kidney injury) (HCC) 6/5/2025     Resolved by  Gus Martinez MD        Discharging Physician / Practitioner: Gus Martinez MD  PCP: Marsha Eduardo DO  Admission Date:   Admission Orders (From admission, onward)       Ordered        06/02/25 2038  Inpatient Admission  Once                          Discharge Date: 06/05/25    Next Steps for Physician/AP Assuming Care:  Patient was started on Eliquis today on 6/5/2025  I did read encouraged the patient to continue this to prevent stroke  PT OT did evaluate no postacute rehab needs  Today deemed fit for discharge    Test Results Pending at Discharge (will require follow up):  None    Medication Changes for Discharge & Rationale:   None  See after visit summary for reconciled discharge medications provided to patient and/or family.     Consultations During Hospital Stay:  Neuro, ICU    Procedures Performed:   TNK    Significant Findings / Test Results:   CT head wo contrast  Result Date: 6/5/2025  Impression: No acute intracranial hemorrhage or acute territorial infarction. Workstation performed: [id number]     CT head wo contrast  Result Date: 6/3/2025  Impression: Known small cortical infarct in left frontal lobe was better evaluated on earlier same day MRI brain without contrast. No acute intracranial hemorrhage. Workstation performed: [id number]     MRI brain wo contrast  Result Date: 6/3/2025  Impression: Single small acute cortical infarction involving the left frontal lobe premotor cortex region. The study was marked in EPIC for immediate notification. Workstation performed: [id number]     CTA stroke alert (head/neck)  Result Date: 6/2/2025  Impression: No acute process on CTA neck and brain. Findings were directly discussed with Dr. Eason at 7:32 p.m. on 6/2/2025. Workstation performed: [id number]     CT stroke alert brain  Result Date: 6/2/2025  Impression: No acute intracranial abnormality. Findings were directly  discussed with Dr. Wilson at approximately 7:21 p.m. on 6/2/2025. Workstation performed: [id number]       No Chest XR results available for this patient.       Incidental Findings:   None    Hospital Course:   Otis Robledo is a 59 y.o. male patient who originally presented to the hospital on 6/2/2025 due to strokelike symptoms.  Was found to have a right facial droop and was given TNK.  Was initially placed into the ICU and was noted to have A-fib.  Today was started with anticoagulation.  Deemed fit for discharge.    Patient was started on Eliquis today on 6/5/2025  I did read encouraged the patient to continue this to prevent stroke  PT OT did evaluate no postacute rehab needs  Today deemed fit for discharge  Please see above list of diagnoses and related plan for additional information.     Discharge Day Visit / Exam:   Subjective:  Patient does not report and headaches, shortness of breath, chest pain, abdominal pain, nausea vomiting, lower leg edema. Also reports good sleep    Vitals: Blood Pressure: 151/99 (06/05/25 0719)  Pulse: 60 (06/05/25 0719)  Temperature: (!) 97.2 °F (36.2 °C) (06/05/25 0708)  Temp Source: Oral (06/04/25 1605)  Respirations: 18 (06/05/25 0708)  Height: 6' (182.9 cm) (06/03/25 1430)  Weight - Scale: 102 kg (225 lb) (06/03/25 1430)  SpO2: 97 % (06/05/25 0719)  Physical Exam  Vitals and nursing note reviewed.   Constitutional:       General: He is not in acute distress.     Appearance: He is well-developed.   HENT:      Head: Normocephalic and atraumatic.     Eyes:      Conjunctiva/sclera: Conjunctivae normal.       Cardiovascular:      Rate and Rhythm: Normal rate and regular rhythm.      Heart sounds: No murmur heard.  Pulmonary:      Effort: Pulmonary effort is normal. No respiratory distress.      Breath sounds: Normal breath sounds.   Abdominal:      Palpations: Abdomen is soft.      Tenderness: There is no abdominal tenderness.     Musculoskeletal:         General: No swelling.       Cervical back: Neck supple.     Skin:     General: Skin is warm and dry.      Capillary Refill: Capillary refill takes less than 2 seconds.     Neurological:      Mental Status: He is alert.     Psychiatric:         Mood and Affect: Mood normal.          Discussion with Family: Updated  (wife) at bedside.    Discharge instructions/Information to patient and family:   See after visit summary for information provided to patient and family.      Provisions for Follow-Up Care:  See after visit summary for information related to follow-up care and any pertinent home health orders.      Mobility at time of Discharge:   Basic Mobility Inpatient Raw Score: 24  JH-HLM Goal: 8: Walk 250 feet or more  JH-HLM Achieved: 8: Walk 250 feet ot more  HLM Goal achieved. Continue to encourage appropriate mobility.     Disposition:   Home    Planned Readmission: None    Administrative Statements   Discharge Statement:  I have spent a total time of 40 minutes in caring for this patient on the day of the visit/encounter. >30 minutes of time was spent on: Diagnostic results, Prognosis, Risks and benefits of tx options, Instructions for management, Patient and family education, Importance of tx compliance, Risk factor reductions, Impressions, Counseling / Coordination of care, Documenting in the medical record, Reviewing / ordering tests, medicine, procedures  , and Communicating with other healthcare professionals .    **Please Note: This note may have been constructed using a voice recognition system**

## 2025-06-05 NOTE — RESTORATIVE TECHNICIAN NOTE
Restorative Technician Note      Patient Name: Otis Robledo     Note Type: Mobility  Patient Position Upon Consult: Supine  Activity Performed: Ambulated; Dangled; Stood  Assistive Device: Other (Comment) (none)  Education Provided: Yes  Patient Position at End of Consult: Supine; All needs within reach; Bed/Chair alarm activated    Dulce LUNA, Restorative Technician,

## 2025-06-06 ENCOUNTER — TRANSITIONAL CARE MANAGEMENT (OUTPATIENT)
Dept: FAMILY MEDICINE CLINIC | Facility: HOSPITAL | Age: 60
End: 2025-06-06

## 2025-06-09 ENCOUNTER — TELEPHONE (OUTPATIENT)
Dept: NEUROLOGY | Facility: CLINIC | Age: 60
End: 2025-06-09

## 2025-06-13 ENCOUNTER — OFFICE VISIT (OUTPATIENT)
Dept: FAMILY MEDICINE CLINIC | Facility: HOSPITAL | Age: 60
End: 2025-06-13
Payer: COMMERCIAL

## 2025-06-13 VITALS
DIASTOLIC BLOOD PRESSURE: 90 MMHG | OXYGEN SATURATION: 98 % | BODY MASS INDEX: 29.2 KG/M2 | SYSTOLIC BLOOD PRESSURE: 122 MMHG | HEART RATE: 54 BPM | HEIGHT: 72 IN | WEIGHT: 215.6 LBS

## 2025-06-13 DIAGNOSIS — E78.5 DYSLIPIDEMIA: ICD-10-CM

## 2025-06-13 DIAGNOSIS — Z76.89 ENCOUNTER FOR SUPPORT AND COORDINATION OF TRANSITION OF CARE: Primary | ICD-10-CM

## 2025-06-13 DIAGNOSIS — I48.0 PAROXYSMAL ATRIAL FIBRILLATION (HCC): ICD-10-CM

## 2025-06-13 DIAGNOSIS — I10 ESSENTIAL HYPERTENSION: ICD-10-CM

## 2025-06-13 DIAGNOSIS — G47.33 OBSTRUCTIVE SLEEP APNEA: ICD-10-CM

## 2025-06-13 PROCEDURE — 99495 TRANSJ CARE MGMT MOD F2F 14D: CPT | Performed by: STUDENT IN AN ORGANIZED HEALTH CARE EDUCATION/TRAINING PROGRAM

## 2025-06-13 NOTE — PROGRESS NOTES
Transition of Care Visit:  Name: Otis Robledo      : 1965      MRN: [id number]  Encounter Provider: Marsha Eduardo DO  Encounter Date: 2025   Encounter department: [facility] [address]    Assessment & Plan  Encounter for support and coordination of transition of care  TCM done as below.        Essential hypertension  Meds reviewed. ON zebeta and HCTZ.        Paroxysmal atrial fibrillation (HCC)  ON eliquis bid now   Orders:  •  Ambulatory Referral to Cardiac Electrophysiology; Future    Dyslipidemia  On lovaza, not any statins at this time       Obstructive sleep apnea           Return in about 6 months (around 2025) for Yrly Physical, BP Check.      History of Present Illness     Transitional Care Management Review:   Otis Robledo is a 59 y.o. male here for TCM follow up.     During the TCM phone call patient stated:  TCM Call (since 6/15/2025)     None      TCM Call (since 6/15/2025)     None        HPI  Adm for stroke-like symptoms.  Was found to have a right facial droop and was given TNK.  Was initially placed into the ICU and was noted to have A-fib.  Today was started with anticoagulation.  Deemed fit for discharge.     · Patient was started on Eliquis today on 2025  · I did read encouraged the patient to continue this to prevent stroke  · PT OT did evaluate no postacute rehab needs  · Today deemed fit for discharge    Thinks alcohol was the trigger also. Stopped any use.   Was feeling PVC like episodes, possibly paroxysmal Afib.   Picked up apple watch, watching for Afib now.     Seeing very high spikes in BP at home at times.   160s/115-120  112/77 was lowest since being home.   Thought maybe pheo was present.   Exercising daily now.   Juice fasting - down 10 lbs.     Is getting flank pain R side, 6-12 months ago, onset, often.   Does go into outside R hip/ thigh. No N/T.     ECHO - •  Left Ventricle: Left ventricular cavity size is normal. Wall thickness is  normal. There is mild concentric hypertrophy. The left ventricular ejection fraction is 70%. Systolic function is hyperdynamic with a mild intracavitary gradient around 15 mmHg without a significant rise with Valsalva. Wall motion is normal. Diastolic function is normal.  •  Right Ventricle: Right ventricular cavity size is normal. Systolic function is normal.  •  Atrial Septum: No patent foramen ovale detected, at rest using agitated saline contrast, by provocation with cough, using agitated saline contrast and with valsalva, using agitated saline contrast.  •  Aorta: The aortic root is normal in size. The ascending aorta is mildly dilated. The aortic root is 3.40 cm. The ascending aorta is 4.1 cm.    Doing a little better on the eliquis than he had on the xarelto.     Had low BP's on amlodipine prior, & gynecomastia.   BB evening & HCTZ In am. ( Maybe wearing off by evening )    Wt Readings from Last 3 Encounters:   06/13/25 97.8 kg (215 lb 9.6 oz)   06/03/25 102 kg (225 lb)   05/07/25 99.8 kg (220 lb)     Temp Readings from Last 3 Encounters:   06/05/25 (!) 97.2 °F (36.2 °C)   10/22/21 98.1 °F (36.7 °C)   09/24/20 (!) 97.3 °F (36.3 °C) (Temporal)     BP Readings from Last 3 Encounters:   06/13/25 122/90   06/05/25 151/99   02/21/25 130/80     Pulse Readings from Last 3 Encounters:   06/13/25 (!) 54   06/05/25 60   02/21/25 57     Review of Systems   Constitutional:  Negative for chills and fever.   Respiratory:  Negative for cough and shortness of breath.    Cardiovascular:  Negative for chest pain and palpitations.   Neurological:  Negative for dizziness, light-headedness and headaches.     Objective   /90   Pulse (!) 54   Ht 6' (1.829 m)   Wt 97.8 kg (215 lb 9.6 oz)   SpO2 98%   BMI 29.24 kg/m²     Physical Exam  Vitals reviewed.   Constitutional:       General: He is not in acute distress.     Appearance: Normal appearance. He is not ill-appearing.   HENT:      Head: Normocephalic and atraumatic.      Eyes:      General: No scleral icterus.        Right eye: No discharge.         Left eye: No discharge.       Cardiovascular:      Rate and Rhythm: Normal rate and regular rhythm.      Pulses: Normal pulses.      Heart sounds: Normal heart sounds. No murmur heard.  Pulmonary:      Effort: Pulmonary effort is normal. No respiratory distress.      Breath sounds: Normal breath sounds. No wheezing.     Musculoskeletal:      Cervical back: Normal range of motion and neck supple.      Right lower leg: No edema.      Left lower leg: No edema.     Neurological:      General: No focal deficit present.      Mental Status: He is alert and oriented to person, place, and time. Mental status is at baseline.      Cranial Nerves: No cranial nerve deficit.      Sensory: No sensory deficit.      Motor: No weakness.      Gait: Gait normal.     Psychiatric:         Mood and Affect: Mood normal.         Behavior: Behavior normal.         Thought Content: Thought content normal.         Judgment: Judgment normal.       Medications have been reviewed by provider in current encounter

## 2025-06-13 NOTE — PATIENT INSTRUCTIONS
[facility] Cardiology: [phone] for Dr. Bah or Peer.   EP Cardio - Dr. Jose Posada - [place] Pulsed Frequency Ablation   Watchman procedure   Neuro : [phone]

## 2025-06-19 ENCOUNTER — TELEPHONE (OUTPATIENT)
Dept: CARDIOLOGY CLINIC | Facility: CLINIC | Age: 60
End: 2025-06-19

## 2025-06-19 NOTE — TELEPHONE ENCOUNTER
It appears we attempted to reach the pt last June to schedule his 1 year f/u with Dr. TANISHA Bah by both phone and My Chart.   As pt has not been seen since 2023, would recommend he be scheduled with an AP at whichever campus has 1st availability he's willing to travel to and an EP referral can be discussed at that visit. It could also probably be a hospital f/u if need be as he was recently in the hospital for stroke-like symptoms.

## 2025-06-19 NOTE — TELEPHONE ENCOUNTER
Working Referral Que     Patient's PCP submitted a a Referral to EP to discuss a possible ablation, but pt has not seen a EP specialist before within [facility] or Out of [facility]. So the referral would need to come from Cardiology to confirm need . Pt was last seen on 6/15/2023 by Dr. Gus Bah and is wondering if we can submit the referral to EP for him??    I explained he would probably need to be seen by our team since he hasn't been seen in almost 3 years but we don't have anything available until SEPT/OCT which seems like a long wait to just get a referral to EP per pt. So I let him know I would send this message to see if we can submit a referral to EP

## 2025-06-21 DIAGNOSIS — F41.9 ANXIETY: ICD-10-CM

## 2025-06-23 RX ORDER — ALPRAZOLAM 0.25 MG
TABLET ORAL
Qty: 30 TABLET | Refills: 0 | Status: SHIPPED | OUTPATIENT
Start: 2025-06-23

## 2025-06-27 DIAGNOSIS — I10 ESSENTIAL HYPERTENSION: ICD-10-CM

## 2025-06-28 DIAGNOSIS — E78.5 DYSLIPIDEMIA: ICD-10-CM

## 2025-06-30 RX ORDER — AMLODIPINE BESYLATE 10 MG/1
10 TABLET ORAL DAILY
Qty: 90 TABLET | Refills: 2 | OUTPATIENT
Start: 2025-06-30

## 2025-06-30 NOTE — TELEPHONE ENCOUNTER
Requested medication(s) are due for refill today: Yes  Patient has already received a courtesy refill: No  Other reason request has been forwarded to provider: Pt has scheduled appt with ANNA Moya. Please review RX. Thank you.    Other (Free Text): Recheck at next visit . Note Text (......Xxx Chief Complaint.): This diagnosis correlates with the Detail Level: Zone Other (Free Text): Patient was referred to Dr. Rocha at the eye [place] . Dr. Rocha removed the lesions 2/28/18. Results are still pending. Will request results . Detail Level: Detailed Other (Free Text): LN

## 2025-07-03 RX ORDER — OMEGA-3-ACID ETHYL ESTERS 1 G/1
2 CAPSULE, LIQUID FILLED ORAL 2 TIMES DAILY
Qty: 360 CAPSULE | Refills: 0 | Status: SHIPPED | OUTPATIENT
Start: 2025-07-03

## 2025-07-16 ENCOUNTER — OFFICE VISIT (OUTPATIENT)
Dept: CARDIOLOGY CLINIC | Facility: CLINIC | Age: 60
End: 2025-07-16
Payer: COMMERCIAL

## 2025-07-16 VITALS
HEART RATE: 53 BPM | BODY MASS INDEX: 29.93 KG/M2 | OXYGEN SATURATION: 97 % | WEIGHT: 221 LBS | SYSTOLIC BLOOD PRESSURE: 124 MMHG | DIASTOLIC BLOOD PRESSURE: 74 MMHG | HEIGHT: 72 IN

## 2025-07-16 DIAGNOSIS — E78.5 DYSLIPIDEMIA: ICD-10-CM

## 2025-07-16 DIAGNOSIS — I49.3 PVC'S (PREMATURE VENTRICULAR CONTRACTIONS): ICD-10-CM

## 2025-07-16 DIAGNOSIS — G47.33 OBSTRUCTIVE SLEEP APNEA: ICD-10-CM

## 2025-07-16 DIAGNOSIS — I10 BENIGN ESSENTIAL HYPERTENSION: ICD-10-CM

## 2025-07-16 DIAGNOSIS — I48.0 PAROXYSMAL ATRIAL FIBRILLATION (HCC): Primary | ICD-10-CM

## 2025-07-16 PROCEDURE — 99214 OFFICE O/P EST MOD 30 MIN: CPT

## 2025-07-16 NOTE — PROGRESS NOTES
Cardiology Follow Up    Otis Robledo  1965 [id number]  [facility] [place]  [address]  [facility] [address]  [facility] [id number] [phone]    1. Paroxysmal atrial fibrillation (HCC)        2. Dyslipidemia        3. Benign essential hypertension        4. PVC's (premature ventricular contractions)        5. Obstructive sleep apnea        Discussion/Summary:  1.Paroxysmal atrial fibrillation  Patient with a history of paroxysmal atrial fibrillation, diagnosed in 2019 when he presented for stress test.  Subsequent Holter monitor demonstrated normal sinus rhythm.  He was originally prescribed Xarelto however he self discontinued Xarelto secondary to intolerable fatigue.  A-fib noted on telemetry during hospitalization and given recent left hemispheric stroke, was restarted on anticoagulation, Eliquis 5 mg twice daily  Echocardiogram from 6/3/2025 demonstrates EF of 70% with mild concentric hypertrophy, normal normal wall motion, normal diastolic function, RV normal size and function, no PFO mild aortic dilatation with ascending aorta 4.1 cm  Patient notes that since he has been home he has had 2 episodes of atrial fibrillation 90s noted on his Apple Watch.  Continue Eliquis 5 mg twice daily  Continue bisoprolol 20 mg daily  Refer to electrophysiology for consideration of ablation    2.  Hypertension  Patient is on hydrochlorothiazide 25 mg daily, bisoprolol 20 mg daily to treat his hypertension.  His blood pressure at home was elevated after he took his morning medications with some readings in the 170s systolic.  He self increased his hydrochlorothiazide to 25 mg twice a day continues with bisoprolol..  States his blood pressure is much better controlled on this regimen.  We did discuss changing his regimen and he does not want to pursue any changes to his antihypertensives at this time.  Pressure in the office today excellent.     3.   "Dyslipidemia  On Lovaza 2 g twice daily -lipid panel on 6/3/2025 demonstrates total cholesterol of 220, triglycerides of 228, HDL 36, LDL of 138 -this was off the Lovaza   He was discharged on atorvastatin -stopped taking the atorvastatin once he was home- patient states he \"refuses to take a statin\".  We discussed the importance of statin therapy with his elevated cholesterol, I did suggest ezetimibe, or potentially PCSK9 inhibitor if we can get his insurance to approve it.  He declines both at this time and will continue the Lovaza  he was previously on    4.  CVA  Patient presented on 6/2/2025 to [facility] with right facial droop, left frontal lobe cortical infarct received TNK with complete resolution.  MRI brain with single small acute cortical infarction involving the left frontal lobe premotor cortex region   CTA of head/neck demonstrates moderate bilateral carotid plaque, less than 50% stenosis.   Patient was to follow-up with neurology post CVA however he does not plan to do so at this time.     Patient does not wish to follow-up with cardiology at this time    Interval History:   Otis Robledo is a 59 y.o. male with a past medical history of hypertension, hyperlipidemia, paroxysmal atrial fibrillation, obstructive sleep apnea who is here for hospitalization follow-up after having a left frontal lobe cortical infarct.  He is a patient of Dr. Gus Bah, and was last seen in the office on 6/15/2023.    Patient was admitted on 6/2/2025 after experiencing a right facial droop, initial CT scan was negative and patient was given TNK.  MRI demonstrates a single small acute cortical infarction involving the left frontal lobe premotor cortex region.  CTA head and neck did not demonstrate any large vein occlusions however did demonstrate some moderate bilateral carotid plaque.  Patient was discharged on Eliquis, and atorvastatin.    Since Otis has been home he has noticed that his blood pressure added intermittent " elevation especially midmorning with a systolic blood pressure in the 170s at times he restarted leftover Norvasc that he had previously had.  This was not effective, he is now taking hydrochlorothiazide 25 mg twice daily along with the bisoprolol.  He states this has helped out tremendously with his blood pressure and it is now typically 130/85 or less.    Medical Problems       Problem List       Anxiety    Obstructive sleep apnea    Benign essential hypertension    Overview Addendum 3/29/2022  3:25 PM by Johnny Lilly MD   Didnt tolerate [id number]         Depression    Direct inguinal hernia    Dyslipidemia    Esophagitis, reflux    Premature ejaculation    PVC's (premature ventricular contractions)    Paroxysmal atrial fibrillation (HCC)    Drug-induced erectile dysfunction    Gynecomastia, male    Stroke (HCC)        Past Medical History[1]  Social History     Socioeconomic History    Marital status: /Civil Union     Spouse name: Not on file    Number of children: Not on file    Years of education: Not on file    Highest education level: Not on file   Occupational History    Not on file   Tobacco Use    Smoking status: Never    Smokeless tobacco: Never   Vaping Use    Vaping status: Never Used   Substance and Sexual Activity    Alcohol use: Yes     Alcohol/week: 1.0 standard drink of alcohol     Types: 1 Cans of beer per week     Comment: twice weekly    Drug use: No    Sexual activity: Not on file   Other Topics Concern    Not on file   Social History Narrative    Not on file     Social Drivers of Health     Financial Resource Strain: Not on file   Food Insecurity: Not on file   Transportation Needs: Not on file   Physical Activity: Not on file   Stress: Not on file   Social Connections: Not on file   Intimate Partner Violence: Not on file   Housing Stability: Not on file      Family History[2]  Past Surgical History[3]  Current Medications[4]  Allergies   Allergen Reactions    Bactrim  [Sulfamethoxazole-Trimethoprim] Diarrhea     vomiting    Pravastatin Palpitations       Labs:     Chemistry        Component Value Date/Time     02/07/2017 1120    K 4.2 06/05/2025 0537    K 4.3 02/14/2025 1207     06/05/2025 0537     02/14/2025 1207    CO2 26 06/05/2025 0537    CO2 32 02/14/2025 1207    BUN 18 06/05/2025 0537    BUN 24 02/14/2025 1207    CREATININE 0.93 06/05/2025 0537    CREATININE 0.93 02/07/2017 1120        Component Value Date/Time    CALCIUM 9.2 06/05/2025 0537    CALCIUM 9.8 02/14/2025 1207    ALKPHOS 52 06/03/2025 0511    ALKPHOS 92 02/14/2025 1207    AST 20 06/03/2025 0511    AST 23 02/14/2025 1207    ALT 32 06/03/2025 0511    ALT 29 02/14/2025 1207    BILITOT 0.4 02/07/2017 1120            Lab Results   Component Value Date    CHOL 219 (H) 02/07/2017     Lab Results   Component Value Date    HDL 36 (L) 06/03/2025    HDL 43 02/14/2025    HDL 33 (L) 06/02/2023     Lab Results   Component Value Date    LDLCALC 138 (H) 06/03/2025    LDLCALC 158 (H) 02/14/2025    LDLCALC  06/02/2023      Comment:         LDL cholesterol not calculated. Triglyceride levels  greater than 400 mg/dL invalidate calculated LDL results.     Reference range: <100     Desirable range <100 mg/dL for primary prevention;    <70 mg/dL for patients with CHD or diabetic patients   with > or = 2 CHD risk factors.     LDL-C is now calculated using the Davide-Porfirio   calculation, which is a validated novel method providing   better accuracy than the Friedewald equation in the   estimation of LDL-C.   Davide SS et al. BRANDON. 2013;310(19): 9382-0484   (http://education.[employer]faq/MQJ302)       Lab Results   Component Value Date    TRIG 228 (H) 06/03/2025    TRIG 246 (H) 02/14/2025    TRIG 480 (H) 06/02/2023       Review of Systems   Cardiovascular:  Positive for irregular heartbeat and palpitations. Negative for chest pain, leg swelling, near-syncope, orthopnea, paroxysmal nocturnal dyspnea and  syncope.   Respiratory:  Positive for snoring.        Vitals:    07/16/25 1519   BP: 124/74   Pulse: (!) 53   SpO2: 97%     Vitals:    07/16/25 1519   Weight: 100 kg (221 lb)     Height: 6' (182.9 cm)   Body mass index is 29.97 kg/m².    Physical Exam  Constitutional:       Appearance: Normal appearance.   HENT:      Head: Normocephalic and atraumatic.      Nose: Nose normal.      Mouth/Throat:      Mouth: Mucous membranes are moist.     Cardiovascular:      Rate and Rhythm: Normal rate and regular rhythm.      Pulses: Normal pulses.      Heart sounds: Normal heart sounds.   Pulmonary:      Effort: Pulmonary effort is normal.      Breath sounds: Normal breath sounds.     Musculoskeletal:      Cervical back: Normal range of motion.      Right lower leg: No edema.      Left lower leg: No edema.     Skin:     General: Skin is warm.      Capillary Refill: Capillary refill takes less than 2 seconds.     Neurological:      General: No focal deficit present.      Mental Status: He is alert and oriented to person, place, and time. Mental status is at baseline.     Psychiatric:         Mood and Affect: Mood normal.         Behavior: Behavior normal.              [1]   Past Medical History:  Diagnosis Date    Arthritis     Depression     Extrinsic asthma 08/15/2008    Exudative pharyngitis 05/20/2020    GERD (gastroesophageal reflux disease)     Headache(784.0)     Hypertension     Visual impairment    [2]   Family History  Problem Relation Name Age of Onset    Depression Son     [3]   Past Surgical History:  Procedure Laterality Date    COLONOSCOPY      EYE SURGERY      FACIAL/NECK BIOPSY N/A 08/14/2018    Procedure: EXCISION BIOPSY LESION POSTERIOR NECK;  Surgeon: Wilberto Bowen MD;  Location: [facility] OR;  Service: General   [4]   Current Outpatient Medications:     ALPRAZolam (XANAX) 0.25 mg tablet, TAKE ONE TABLET BY MOUTH DAILY AT BEDTIME AS NEEDED FOR ANXIETY OR PANIC ATTACK, Disp: 30 tablet, Rfl: 0    apixaban  (ELIQUIS) 5 mg, Take 1 tablet (5 mg total) by mouth 2 (two) times a day, Disp: 180 tablet, Rfl: 0    bisoprolol (ZEBETA) 10 MG tablet, Take 2 tablets (20 mg total) by mouth daily, Disp: 180 tablet, Rfl: 0    Cholecalciferol (Vitamin D3) 250 MCG (42548 UT) TABS, Take by mouth, Disp: , Rfl:     hydroCHLOROthiazide 25 mg tablet, Take 1 tablet (25 mg total) by mouth daily (Patient taking differently: Take 25 mg by mouth 2 (two) times a day), Disp: 90 tablet, Rfl: 0    omega-3-acid ethyl esters (LOVAZA) 1 g capsule, TAKE TWO CAPSULES BY MOUTH TWICE A DAY, Disp: 360 capsule, Rfl: 0    pantoprazole (PROTONIX) 40 mg tablet, TAKE ONE TABLET BY MOUTH EVERY DAY, Disp: 100 tablet, Rfl: 1

## 2025-07-19 DIAGNOSIS — K21.00 ESOPHAGITIS, REFLUX: ICD-10-CM

## 2025-07-21 RX ORDER — PANTOPRAZOLE SODIUM 40 MG/1
40 TABLET, DELAYED RELEASE ORAL DAILY
Qty: 100 TABLET | Refills: 1 | Status: SHIPPED | OUTPATIENT
Start: 2025-07-21

## 2025-08-15 ENCOUNTER — TELEPHONE (OUTPATIENT)
Dept: NEUROLOGY | Facility: CLINIC | Age: 60
End: 2025-08-15

## (undated) DEVICE — CONMED ACCESSORY ELECTRODE, FLAT BLADE WITH EXTENDED INSULATION: Brand: CONMED

## (undated) DEVICE — SUT MONOCRYL 4-0 PS-2 27 IN Y426H

## (undated) DEVICE — TUBING SUCTION 5MM X 12 FT

## (undated) DEVICE — SUT VICRYL 3-0 SH 27 IN J416H

## (undated) DEVICE — ADHESIVE SKN CLSR HISTOACRYL FLEX 0.5ML LF

## (undated) DEVICE — GLOVE INDICATOR PI UNDERGLOVE SZ 6.5 BLUE

## (undated) DEVICE — INTENDED FOR TISSUE SEPARATION, AND OTHER PROCEDURES THAT REQUIRE A SHARP SURGICAL BLADE TO PUNCTURE OR CUT.: Brand: BARD-PARKER SAFETY BLADES SIZE 15, STERILE

## (undated) DEVICE — VIAL DECANTER

## (undated) DEVICE — GLOVE SRG BIOGEL ECLIPSE 8

## (undated) DEVICE — GLOVE SRG BIOGEL 6.5

## (undated) DEVICE — GLOVE INDICATOR PI UNDERGLOVE SZ 8 BLUE

## (undated) DEVICE — CHLORAPREP HI-LITE 26ML ORANGE

## (undated) DEVICE — SPECIMEN CONTAINER STERILE PEEL PACK

## (undated) DEVICE — BETHLEHEM UNIVERSAL OUTPATIENT: Brand: CARDINAL HEALTH

## (undated) DEVICE — 10FR FRAZIER SUCTION HANDLE: Brand: CARDINAL HEALTH